# Patient Record
Sex: MALE | Race: OTHER | HISPANIC OR LATINO | URBAN - METROPOLITAN AREA
[De-identification: names, ages, dates, MRNs, and addresses within clinical notes are randomized per-mention and may not be internally consistent; named-entity substitution may affect disease eponyms.]

---

## 2018-10-19 ENCOUNTER — EMERGENCY (EMERGENCY)
Facility: HOSPITAL | Age: 68
LOS: 1 days | End: 2018-10-19
Attending: EMERGENCY MEDICINE
Payer: MEDICARE

## 2018-10-19 VITALS — HEIGHT: 69 IN | WEIGHT: 220.02 LBS

## 2018-10-19 PROCEDURE — 99284 EMERGENCY DEPT VISIT MOD MDM: CPT

## 2018-10-19 PROCEDURE — 73030 X-RAY EXAM OF SHOULDER: CPT | Mod: 26,LT

## 2018-10-19 PROCEDURE — 71045 X-RAY EXAM CHEST 1 VIEW: CPT | Mod: 26

## 2018-10-19 PROCEDURE — 93010 ELECTROCARDIOGRAM REPORT: CPT

## 2018-10-19 RX ORDER — ACETAMINOPHEN 500 MG
975 TABLET ORAL ONCE
Qty: 0 | Refills: 0 | Status: COMPLETED | OUTPATIENT
Start: 2018-10-19 | End: 2018-10-20

## 2018-10-19 NOTE — ED STATDOCS - PROGRESS NOTE DETAILS
69 y/o M pt with hx of Parkinson's disease (Dx 2010) presents to ED c/o head and shoulder pain s/p fall x 2 today. Pt's family state pt is not on medication by choice and his symptoms are worsening. Pt has had frequent falls. Pt states is open to reconsidering taking medication. Has appt with neuro soon. NKDA. Nonsmoker, denies EtOH.   Pt will be placed in the main ED for complete evaluation by another provider.

## 2018-10-20 VITALS
DIASTOLIC BLOOD PRESSURE: 63 MMHG | HEART RATE: 67 BPM | TEMPERATURE: 98 F | RESPIRATION RATE: 16 BRPM | OXYGEN SATURATION: 97 % | SYSTOLIC BLOOD PRESSURE: 130 MMHG
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
## 2018-10-20 LAB
ALBUMIN SERPL ELPH-MCNC: 4.1 G/DL — SIGNIFICANT CHANGE UP (ref 3.3–5.2)
ALP SERPL-CCNC: 61 U/L — SIGNIFICANT CHANGE UP (ref 40–120)
ALT FLD-CCNC: 20 U/L — SIGNIFICANT CHANGE UP
ANION GAP SERPL CALC-SCNC: 9 MMOL/L — SIGNIFICANT CHANGE UP (ref 5–17)
APPEARANCE UR: CLEAR — SIGNIFICANT CHANGE UP
AST SERPL-CCNC: 40 U/L — HIGH
BASOPHILS # BLD AUTO: 0 K/UL — SIGNIFICANT CHANGE UP (ref 0–0.2)
BASOPHILS NFR BLD AUTO: 0.3 % — SIGNIFICANT CHANGE UP (ref 0–2)
BILIRUB SERPL-MCNC: 0.5 MG/DL — SIGNIFICANT CHANGE UP (ref 0.4–2)
BILIRUB UR-MCNC: NEGATIVE — SIGNIFICANT CHANGE UP
BUN SERPL-MCNC: 24 MG/DL — HIGH (ref 8–20)
CALCIUM SERPL-MCNC: 9.1 MG/DL — SIGNIFICANT CHANGE UP (ref 8.6–10.2)
CHLORIDE SERPL-SCNC: 105 MMOL/L — SIGNIFICANT CHANGE UP (ref 98–107)
CK MB CFR SERPL CALC: 7.5 NG/ML — HIGH (ref 0–6.7)
CK SERPL-CCNC: 1537 U/L — HIGH (ref 30–200)
CO2 SERPL-SCNC: 27 MMOL/L — SIGNIFICANT CHANGE UP (ref 22–29)
COLOR SPEC: YELLOW — SIGNIFICANT CHANGE UP
CREAT SERPL-MCNC: 1.61 MG/DL — HIGH (ref 0.5–1.3)
DIFF PNL FLD: NEGATIVE — SIGNIFICANT CHANGE UP
EOSINOPHIL # BLD AUTO: 0.2 K/UL — SIGNIFICANT CHANGE UP (ref 0–0.5)
EOSINOPHIL NFR BLD AUTO: 2.6 % — SIGNIFICANT CHANGE UP (ref 0–5)
EPI CELLS # UR: SIGNIFICANT CHANGE UP
GLUCOSE SERPL-MCNC: 109 MG/DL — SIGNIFICANT CHANGE UP (ref 70–115)
GLUCOSE UR QL: NEGATIVE MG/DL — SIGNIFICANT CHANGE UP
HCT VFR BLD CALC: 39.1 % — LOW (ref 42–52)
HGB BLD-MCNC: 12 G/DL — LOW (ref 14–18)
KETONES UR-MCNC: ABNORMAL
LEUKOCYTE ESTERASE UR-ACNC: ABNORMAL
LYMPHOCYTES # BLD AUTO: 1.7 K/UL — SIGNIFICANT CHANGE UP (ref 1–4.8)
LYMPHOCYTES # BLD AUTO: 22.9 % — SIGNIFICANT CHANGE UP (ref 20–55)
MCHC RBC-ENTMCNC: 27.5 PG — SIGNIFICANT CHANGE UP (ref 27–31)
MCHC RBC-ENTMCNC: 30.7 G/DL — LOW (ref 32–36)
MCV RBC AUTO: 89.7 FL — SIGNIFICANT CHANGE UP (ref 80–94)
MONOCYTES # BLD AUTO: 0.5 K/UL — SIGNIFICANT CHANGE UP (ref 0–0.8)
MONOCYTES NFR BLD AUTO: 7.3 % — SIGNIFICANT CHANGE UP (ref 3–10)
NEUTROPHILS # BLD AUTO: 4.8 K/UL — SIGNIFICANT CHANGE UP (ref 1.8–8)
NEUTROPHILS NFR BLD AUTO: 66.6 % — SIGNIFICANT CHANGE UP (ref 37–73)
NITRITE UR-MCNC: NEGATIVE — SIGNIFICANT CHANGE UP
NT-PROBNP SERPL-SCNC: 190 PG/ML — SIGNIFICANT CHANGE UP (ref 0–300)
PH UR: 5 — SIGNIFICANT CHANGE UP (ref 5–8)
PLATELET # BLD AUTO: 183 K/UL — SIGNIFICANT CHANGE UP (ref 150–400)
POTASSIUM SERPL-MCNC: 4 MMOL/L — SIGNIFICANT CHANGE UP (ref 3.5–5.3)
POTASSIUM SERPL-SCNC: 4 MMOL/L — SIGNIFICANT CHANGE UP (ref 3.5–5.3)
PROT SERPL-MCNC: 6.9 G/DL — SIGNIFICANT CHANGE UP (ref 6.6–8.7)
PROT UR-MCNC: 30 MG/DL
RBC # BLD: 4.36 M/UL — LOW (ref 4.6–6.2)
RBC # FLD: 13.7 % — SIGNIFICANT CHANGE UP (ref 11–15.6)
RBC CASTS # UR COMP ASSIST: NEGATIVE /HPF — SIGNIFICANT CHANGE UP (ref 0–4)
SODIUM SERPL-SCNC: 141 MMOL/L — SIGNIFICANT CHANGE UP (ref 135–145)
SP GR SPEC: 1.02 — SIGNIFICANT CHANGE UP (ref 1.01–1.02)
TROPONIN T SERPL-MCNC: <0.01 NG/ML — SIGNIFICANT CHANGE UP (ref 0–0.06)
UROBILINOGEN FLD QL: NEGATIVE MG/DL — SIGNIFICANT CHANGE UP
WBC # BLD: 7.3 K/UL — SIGNIFICANT CHANGE UP (ref 4.8–10.8)
WBC # FLD AUTO: 7.3 K/UL — SIGNIFICANT CHANGE UP (ref 4.8–10.8)
WBC UR QL: SIGNIFICANT CHANGE UP

## 2018-10-20 PROCEDURE — 70450 CT HEAD/BRAIN W/O DYE: CPT | Mod: 26

## 2018-10-20 PROCEDURE — 82550 ASSAY OF CK (CPK): CPT

## 2018-10-20 PROCEDURE — 82553 CREATINE MB FRACTION: CPT

## 2018-10-20 PROCEDURE — 70450 CT HEAD/BRAIN W/O DYE: CPT

## 2018-10-20 PROCEDURE — 71045 X-RAY EXAM CHEST 1 VIEW: CPT

## 2018-10-20 PROCEDURE — 73030 X-RAY EXAM OF SHOULDER: CPT

## 2018-10-20 PROCEDURE — 36415 COLL VENOUS BLD VENIPUNCTURE: CPT

## 2018-10-20 PROCEDURE — 80053 COMPREHEN METABOLIC PANEL: CPT

## 2018-10-20 PROCEDURE — 85027 COMPLETE CBC AUTOMATED: CPT

## 2018-10-20 PROCEDURE — 84484 ASSAY OF TROPONIN QUANT: CPT

## 2018-10-20 PROCEDURE — 99284 EMERGENCY DEPT VISIT MOD MDM: CPT | Mod: 25

## 2018-10-20 PROCEDURE — 93005 ELECTROCARDIOGRAM TRACING: CPT

## 2018-10-20 PROCEDURE — 83880 ASSAY OF NATRIURETIC PEPTIDE: CPT

## 2018-10-20 PROCEDURE — 81001 URINALYSIS AUTO W/SCOPE: CPT

## 2018-10-20 RX ADMIN — Medication 975 MILLIGRAM(S): at 02:24

## 2018-10-20 NOTE — ED ADULT NURSE NOTE - OBJECTIVE STATEMENT
Pt A&Ox4 present to ED after fall x2 yesterday has hx of parkinson's does not take medication at this time. Pt resting comfortably, VSS, no signs of distress at this time, CM in place,  safety maintained, call bell in reach.

## 2018-10-20 NOTE — ED PROVIDER NOTE - MEDICAL DECISION MAKING DETAILS
Will get labs and head CT, general medical evaluation, if negative, not safe discharge due to poor gait, will need to admit, get neuro consult and start treatment

## 2018-10-20 NOTE — ED ADULT NURSE NOTE - NSIMPLEMENTINTERV_GEN_ALL_ED
Implemented All Fall with Harm Risk Interventions:  [place] to call system. Call bell, personal items and telephone within reach. Instruct patient to call for assistance. Room bathroom lighting operational. Non-slip footwear when patient is off stretcher. Physically safe environment: no spills, clutter or unnecessary equipment. Stretcher in lowest position, wheels locked, appropriate side rails in place. Provide visual cue, wrist band, yellow gown, etc. Monitor gait and stability. Monitor for mental status changes and reorient to person, place, and time. Review medications for side effects contributing to fall risk. Reinforce activity limits and safety measures with patient and family. Provide visual clues: red socks.

## 2018-10-20 NOTE — ED PROVIDER NOTE - OBJECTIVE STATEMENT
67 y/o M pt with hx of Parkinson's presents to ED c/o head injury s/p fall that occurred today. Pt slipped and fell twice earlier today. Per pt's daughter, he has been falling frequently due to worsening Parkinson's and does use a walker at baseline. He does not take any medications. Pt has an appointment with a neurologist next week. Denies LOC, neck pain, back pain, and fever. No further complaints at this time.   Neuro: Dr. Castle

## 2018-10-20 NOTE — ED PROVIDER NOTE - PROGRESS NOTE DETAILS
reassessed, more family at bedside; contrary to prior conversation about falls and safety concerns, now patient and family are insistent on taking patient home, they have outpt f/u with neuro in 1 week, precautions disdcussed ok for d/c

## 2018-11-10 ENCOUNTER — EMERGENCY (EMERGENCY)
Facility: HOSPITAL | Age: 68
LOS: 1 days | Discharge: HOME | End: 2018-11-10
Attending: EMERGENCY MEDICINE
Payer: MEDICARE

## 2018-11-10 VITALS
DIASTOLIC BLOOD PRESSURE: 70 MMHG | TEMPERATURE: 99 F | HEIGHT: 70 IN | OXYGEN SATURATION: 96 % | WEIGHT: 210.1 LBS | HEART RATE: 83 BPM | SYSTOLIC BLOOD PRESSURE: 144 MMHG | RESPIRATION RATE: 18 BRPM

## 2018-11-10 LAB
ALBUMIN SERPL ELPH-MCNC: 3.8 G/DL — SIGNIFICANT CHANGE UP (ref 3.3–5.2)
ALP SERPL-CCNC: 56 U/L — SIGNIFICANT CHANGE UP (ref 40–120)
ALT FLD-CCNC: 5 U/L — SIGNIFICANT CHANGE UP
ANION GAP SERPL CALC-SCNC: 10 MMOL/L — SIGNIFICANT CHANGE UP (ref 5–17)
APPEARANCE UR: CLEAR — SIGNIFICANT CHANGE UP
APTT BLD: 29.1 SEC — SIGNIFICANT CHANGE UP (ref 27.5–36.3)
AST SERPL-CCNC: 15 U/L — SIGNIFICANT CHANGE UP
BACTERIA # UR AUTO: ABNORMAL
BASOPHILS # BLD AUTO: 0 K/UL — SIGNIFICANT CHANGE UP (ref 0–0.2)
BASOPHILS NFR BLD AUTO: 0.5 % — SIGNIFICANT CHANGE UP (ref 0–2)
BILIRUB SERPL-MCNC: 0.3 MG/DL — LOW (ref 0.4–2)
BILIRUB UR-MCNC: NEGATIVE — SIGNIFICANT CHANGE UP
BUN SERPL-MCNC: 20 MG/DL — SIGNIFICANT CHANGE UP (ref 8–20)
CALCIUM SERPL-MCNC: 8.7 MG/DL — SIGNIFICANT CHANGE UP (ref 8.6–10.2)
CHLORIDE SERPL-SCNC: 106 MMOL/L — SIGNIFICANT CHANGE UP (ref 98–107)
CO2 SERPL-SCNC: 25 MMOL/L — SIGNIFICANT CHANGE UP (ref 22–29)
COLOR SPEC: YELLOW — SIGNIFICANT CHANGE UP
COMMENT - URINE: SIGNIFICANT CHANGE UP
CREAT SERPL-MCNC: 0.83 MG/DL — SIGNIFICANT CHANGE UP (ref 0.5–1.3)
DIFF PNL FLD: NEGATIVE — SIGNIFICANT CHANGE UP
EOSINOPHIL # BLD AUTO: 0.2 K/UL — SIGNIFICANT CHANGE UP (ref 0–0.5)
EOSINOPHIL NFR BLD AUTO: 3.6 % — SIGNIFICANT CHANGE UP (ref 0–6)
EPI CELLS # UR: SIGNIFICANT CHANGE UP
GLUCOSE SERPL-MCNC: 104 MG/DL — SIGNIFICANT CHANGE UP (ref 70–115)
GLUCOSE UR QL: NEGATIVE MG/DL — SIGNIFICANT CHANGE UP
HCT VFR BLD CALC: 35.8 % — LOW (ref 42–52)
HGB BLD-MCNC: 11.4 G/DL — LOW (ref 14–18)
INR BLD: 1.09 RATIO — SIGNIFICANT CHANGE UP (ref 0.88–1.16)
KETONES UR-MCNC: ABNORMAL
LEUKOCYTE ESTERASE UR-ACNC: NEGATIVE — SIGNIFICANT CHANGE UP
LYMPHOCYTES # BLD AUTO: 1.3 K/UL — SIGNIFICANT CHANGE UP (ref 1–4.8)
LYMPHOCYTES # BLD AUTO: 20.6 % — SIGNIFICANT CHANGE UP (ref 20–55)
MAGNESIUM SERPL-MCNC: 2.1 MG/DL — SIGNIFICANT CHANGE UP (ref 1.6–2.6)
MCHC RBC-ENTMCNC: 28.1 PG — SIGNIFICANT CHANGE UP (ref 27–31)
MCHC RBC-ENTMCNC: 31.8 G/DL — LOW (ref 32–36)
MCV RBC AUTO: 88.2 FL — SIGNIFICANT CHANGE UP (ref 80–94)
MONOCYTES # BLD AUTO: 0.6 K/UL — SIGNIFICANT CHANGE UP (ref 0–0.8)
MONOCYTES NFR BLD AUTO: 8.7 % — SIGNIFICANT CHANGE UP (ref 3–10)
NEUTROPHILS # BLD AUTO: 4.2 K/UL — SIGNIFICANT CHANGE UP (ref 1.8–8)
NEUTROPHILS NFR BLD AUTO: 66.6 % — SIGNIFICANT CHANGE UP (ref 37–73)
NITRITE UR-MCNC: NEGATIVE — SIGNIFICANT CHANGE UP
PH UR: 6 — SIGNIFICANT CHANGE UP (ref 5–8)
PLATELET # BLD AUTO: 190 K/UL — SIGNIFICANT CHANGE UP (ref 150–400)
POTASSIUM SERPL-MCNC: 4.3 MMOL/L — SIGNIFICANT CHANGE UP (ref 3.5–5.3)
POTASSIUM SERPL-SCNC: 4.3 MMOL/L — SIGNIFICANT CHANGE UP (ref 3.5–5.3)
PROT SERPL-MCNC: 6.4 G/DL — LOW (ref 6.6–8.7)
PROT UR-MCNC: 15 MG/DL
PROTHROM AB SERPL-ACNC: 12.6 SEC — SIGNIFICANT CHANGE UP (ref 10–12.9)
RBC # BLD: 4.06 M/UL — LOW (ref 4.6–6.2)
RBC # FLD: 13.3 % — SIGNIFICANT CHANGE UP (ref 11–15.6)
RBC CASTS # UR COMP ASSIST: SIGNIFICANT CHANGE UP /HPF (ref 0–4)
SODIUM SERPL-SCNC: 141 MMOL/L — SIGNIFICANT CHANGE UP (ref 135–145)
SP GR SPEC: 1.01 — SIGNIFICANT CHANGE UP (ref 1.01–1.02)
TROPONIN T SERPL-MCNC: <0.01 NG/ML — SIGNIFICANT CHANGE UP (ref 0–0.06)
TSH SERPL-MCNC: 0.56 UIU/ML — SIGNIFICANT CHANGE UP (ref 0.27–4.2)
UROBILINOGEN FLD QL: 1 MG/DL
WBC # BLD: 6.4 K/UL — SIGNIFICANT CHANGE UP (ref 4.8–10.8)
WBC # FLD AUTO: 6.4 K/UL — SIGNIFICANT CHANGE UP (ref 4.8–10.8)
WBC UR QL: SIGNIFICANT CHANGE UP

## 2018-11-10 PROCEDURE — 85027 COMPLETE CBC AUTOMATED: CPT

## 2018-11-10 PROCEDURE — 87086 URINE CULTURE/COLONY COUNT: CPT

## 2018-11-10 PROCEDURE — 85730 THROMBOPLASTIN TIME PARTIAL: CPT

## 2018-11-10 PROCEDURE — 99285 EMERGENCY DEPT VISIT HI MDM: CPT

## 2018-11-10 PROCEDURE — 99284 EMERGENCY DEPT VISIT MOD MDM: CPT | Mod: 25

## 2018-11-10 PROCEDURE — 36415 COLL VENOUS BLD VENIPUNCTURE: CPT

## 2018-11-10 PROCEDURE — 70450 CT HEAD/BRAIN W/O DYE: CPT

## 2018-11-10 PROCEDURE — 96374 THER/PROPH/DIAG INJ IV PUSH: CPT

## 2018-11-10 PROCEDURE — 71045 X-RAY EXAM CHEST 1 VIEW: CPT | Mod: 26

## 2018-11-10 PROCEDURE — 83735 ASSAY OF MAGNESIUM: CPT

## 2018-11-10 PROCEDURE — 81001 URINALYSIS AUTO W/SCOPE: CPT

## 2018-11-10 PROCEDURE — 93005 ELECTROCARDIOGRAM TRACING: CPT

## 2018-11-10 PROCEDURE — 80053 COMPREHEN METABOLIC PANEL: CPT

## 2018-11-10 PROCEDURE — 93010 ELECTROCARDIOGRAM REPORT: CPT

## 2018-11-10 PROCEDURE — 70450 CT HEAD/BRAIN W/O DYE: CPT | Mod: 26

## 2018-11-10 PROCEDURE — 84443 ASSAY THYROID STIM HORMONE: CPT

## 2018-11-10 PROCEDURE — 85610 PROTHROMBIN TIME: CPT

## 2018-11-10 PROCEDURE — 84484 ASSAY OF TROPONIN QUANT: CPT

## 2018-11-10 PROCEDURE — 71045 X-RAY EXAM CHEST 1 VIEW: CPT

## 2018-11-10 PROCEDURE — 85652 RBC SED RATE AUTOMATED: CPT

## 2018-11-10 RX ORDER — HALOPERIDOL DECANOATE 100 MG/ML
2 INJECTION INTRAMUSCULAR ONCE
Qty: 0 | Refills: 0 | Status: COMPLETED | OUTPATIENT
Start: 2018-11-10 | End: 2018-11-10

## 2018-11-10 RX ADMIN — HALOPERIDOL DECANOATE 2 MILLIGRAM(S): 100 INJECTION INTRAMUSCULAR at 22:57

## 2018-11-10 NOTE — ED ADULT NURSE REASSESSMENT NOTE - NS ED NURSE REASSESS COMMENT FT1
Pt. found wandering hospital halls in plain clothes. confused to place and situation, oriented to person only. escorted back to stretcher with assistance of security, combative and hitting ED staff. As per triage note patient was hallucinating at home, Hx of Parkinson's. notified MD of situation, to see and order 1:1

## 2018-11-10 NOTE — ED ADULT NURSE NOTE - INTERVENTIONS DEFINITIONS
Monitor gait and stability/Physically safe environment: no spills, clutter or unnecessary equipment/Monitor for mental status changes and reorient to person, place, and time/Reinforce activity limits and safety measures with patient and family/Stretcher in lowest position, wheels locked, appropriate side rails in place

## 2018-11-10 NOTE — ED ADULT TRIAGE NOTE - CHIEF COMPLAINT QUOTE
Pt states "I'm here for the same thing that happened last week, I'm hallucinating", as per EMS "Wife called because he thought two men were breaking in to the house and she wants him to get checked out", pt compliant with medication, denies SI/HI at this time

## 2018-11-10 NOTE — ED PROVIDER NOTE - OBJECTIVE STATEMENT
69 y/o M hx of Parkinson's Disease BIB family to the ED for bizarre behavior. History obtained from wife. Wife states at baseline pt is able to walk on his own and is aware of his surroundings. Pt is scheduled for PT/OT from neurologist, has walker at home but refuses to use it. Today, pt woke up at baseline, wife states pt began to feel that intruders had entered his home. Pt states the two intruders has followed him to the hospital. Wife states there were no intruders presents and typically she is able to convince pt that any suspicious noises or sounds were caused by her closing doors. +chronic leg edema.

## 2018-11-10 NOTE — ED PROVIDER NOTE - PHYSICAL EXAMINATION
Constitutional: Appears comfortably, talking in full sentences  Head: NC/AT, no swelling  Eyes: EOMI, no swelling  Mouth: mm moist  Neck: supple, trachea is midline  Chest: Bilateral air entry, symmetrical chest expansion, no distress  Heart: S1 S2 distant  Abdomen: abd soft, non tender  Musc/Skel: extremities with no swelling, no deformity, no spine tenderness, distal pulses present  Neuro: A&Ox3, no focal deficits Constitutional: Appears comfortably, minimal verbal communication, talks in incomprehensible words, flat affect, decreased eye contact  Head: NC/AT, no swelling  Eyes: EOMI, no swelling  Mouth: mm moist  Neck: supple, trachea is midline  Chest: Bilateral air entry, symmetrical chest expansion, poor inspiratory effort decreased breath sounds, no distress  Heart: S1 S2 distant  Abdomen: abd soft, non tender ,no groin erythema  Musc/Skel: extremities with no swelling, no deformity, no spine tenderness, distal pulses present  Neuro: A&Ox3, no focal deficits, no nystagmus, generalized weakness, walking with shuffled gait Constitutional: Appears comfortably, minimal verbal communication, talks in incomprehensible words, flat affect, decreased eye contact  Head: NC/AT, no swelling  Eyes: EOMI, no swelling  Mouth: mm moist  Neck: supple, trachea is midline  Chest: Bilateral air entry, symmetrical chest expansion, poor inspiratory effort decreased breath sounds, no distress  Heart: S1 S2 distant  Abdomen: abd soft, non tender, no groin erythema  Musc/Skel: +1 pitting edema to bilateral lower extremities, no deformity, no spine tenderness, distal pulses present  Neuro: A&Ox3, no focal deficits, no nystagmus, generalized weakness, walking with shuffled gait Constitutional: Appears comfortably, minimal verbal communication, talks in incomprehensible words, flat affect, decreased eye contact  Head: NC/AT, no swelling  Eyes: EOMI, no swelling  Mouth: mm moist  Neck: supple, trachea is midline  Chest: Bilateral air entry, symmetrical chest expansion, poor inspiratory effort decreased breath sounds, no distress  Heart: S1 S2 distant  Abdomen: abd soft, non tender, no groin erythema  Musc/Skel: +1 pitting edema to bilateral lower extremities, bruises on arms, no deformity, no spine tenderness, distal pulses present  Neuro: A&O1, no focal deficits, no nystagmus, generalized weakness, walking with shuffled gait

## 2018-11-10 NOTE — ED ADULT NURSE NOTE - OBJECTIVE STATEMENT
As per triage note pt. was hallucinating at home stating two men were in his home. Wife at bedside explains pt. decline over past month, in and out of states of lucidness. As per triage note pt. was hallucinating at home stating two men were in his home. Wife at bedside explains pt. decline over past month, in and out of states of lucidness. Hx of Parkinson's. recently d.c from [facility] two weeks prior after fall and [place] after Psych Consult. In ED at this time patient found walking down hallways, confused as to time, place, and situation, aggressive and combative with staff. able to escort back to stretcher with assistance of security.

## 2018-11-11 VITALS
DIASTOLIC BLOOD PRESSURE: 82 MMHG | HEART RATE: 63 BPM | TEMPERATURE: 98 F | OXYGEN SATURATION: 97 % | RESPIRATION RATE: 18 BRPM | SYSTOLIC BLOOD PRESSURE: 169 MMHG

## 2018-11-11 PROBLEM — G20 PARKINSON'S DISEASE: Chronic | Status: ACTIVE | Noted: 2018-10-20

## 2018-11-11 LAB — ERYTHROCYTE [SEDIMENTATION RATE] IN BLOOD: 10 MM/HR — SIGNIFICANT CHANGE UP (ref 0–20)

## 2018-11-11 NOTE — ED ADULT NURSE REASSESSMENT NOTE - NS ED NURSE REASSESS COMMENT FT1
[employer] at bedside for pt. p/u. awaiting additional crew members to assist with patient into home [employer] at bedside for pt. p/u. awaiting additional crew members to assist with patient into home. Family members contacted and awaiting pt.

## 2018-11-11 NOTE — ED ADULT NURSE REASSESSMENT NOTE - NS ED NURSE REASSESS COMMENT FT1
Pt. sleeping but awakens easily to vital signs. baseline mentation. environment safe. will continue to monitor and maintain safety.

## 2018-11-12 LAB
CULTURE RESULTS: NO GROWTH — SIGNIFICANT CHANGE UP
SPECIMEN SOURCE: SIGNIFICANT CHANGE UP

## 2019-01-08 ENCOUNTER — INPATIENT (INPATIENT)
Facility: HOSPITAL | Age: 69
LOS: 6 days | Discharge: HOME | DRG: 871 | End: 2019-01-15
Attending: INTERNAL MEDICINE | Admitting: HOSPITALIST
Payer: COMMERCIAL

## 2019-01-08 VITALS
RESPIRATION RATE: 18 BRPM | HEIGHT: 69 IN | HEART RATE: 96 BPM | OXYGEN SATURATION: 96 % | WEIGHT: 214.95 LBS | SYSTOLIC BLOOD PRESSURE: 130 MMHG | TEMPERATURE: 101 F | DIASTOLIC BLOOD PRESSURE: 74 MMHG

## 2019-01-08 LAB
ANION GAP SERPL CALC-SCNC: 11 MMOL/L — SIGNIFICANT CHANGE UP (ref 5–17)
ANISOCYTOSIS BLD QL: SLIGHT — SIGNIFICANT CHANGE UP
BUN SERPL-MCNC: 20 MG/DL — SIGNIFICANT CHANGE UP (ref 8–20)
CALCIUM SERPL-MCNC: 8.5 MG/DL — LOW (ref 8.6–10.2)
CHLORIDE SERPL-SCNC: 102 MMOL/L — SIGNIFICANT CHANGE UP (ref 98–107)
CO2 SERPL-SCNC: 26 MMOL/L — SIGNIFICANT CHANGE UP (ref 22–29)
CREAT SERPL-MCNC: 1.37 MG/DL — HIGH (ref 0.5–1.3)
GLUCOSE SERPL-MCNC: 123 MG/DL — HIGH (ref 70–115)
HCT VFR BLD CALC: 34.6 % — LOW (ref 42–52)
HGB BLD-MCNC: 11.2 G/DL — LOW (ref 14–18)
LACTATE BLDV-MCNC: 0.8 MMOL/L — SIGNIFICANT CHANGE UP (ref 0.5–2)
LYMPHOCYTES # BLD AUTO: 1 % — LOW (ref 20–55)
MACROCYTES BLD QL: SLIGHT — SIGNIFICANT CHANGE UP
MCHC RBC-ENTMCNC: 28.3 PG — SIGNIFICANT CHANGE UP (ref 27–31)
MCHC RBC-ENTMCNC: 32.4 G/DL — SIGNIFICANT CHANGE UP (ref 32–36)
MCV RBC AUTO: 87.4 FL — SIGNIFICANT CHANGE UP (ref 80–94)
MICROCYTES BLD QL: SLIGHT — SIGNIFICANT CHANGE UP
MONOCYTES NFR BLD AUTO: 3 % — SIGNIFICANT CHANGE UP (ref 3–10)
NEUTROPHILS NFR BLD AUTO: 95 % — HIGH (ref 37–73)
NEUTS BAND # BLD: 1 % — SIGNIFICANT CHANGE UP (ref 0–8)
PLAT MORPH BLD: NORMAL — SIGNIFICANT CHANGE UP
PLATELET # BLD AUTO: 147 K/UL — LOW (ref 150–400)
POTASSIUM SERPL-MCNC: 4.2 MMOL/L — SIGNIFICANT CHANGE UP (ref 3.5–5.3)
POTASSIUM SERPL-SCNC: 4.2 MMOL/L — SIGNIFICANT CHANGE UP (ref 3.5–5.3)
RBC # BLD: 3.96 M/UL — LOW (ref 4.6–6.2)
RBC # FLD: 13.9 % — SIGNIFICANT CHANGE UP (ref 11–15.6)
RBC BLD AUTO: ABNORMAL
SODIUM SERPL-SCNC: 139 MMOL/L — SIGNIFICANT CHANGE UP (ref 135–145)
WBC # BLD: 15.5 K/UL — HIGH (ref 4.8–10.8)
WBC # FLD AUTO: 15.5 K/UL — HIGH (ref 4.8–10.8)

## 2019-01-08 PROCEDURE — 99285 EMERGENCY DEPT VISIT HI MDM: CPT

## 2019-01-08 RX ORDER — ACETAMINOPHEN 500 MG
650 TABLET ORAL ONCE
Qty: 0 | Refills: 0 | Status: COMPLETED | OUTPATIENT
Start: 2019-01-08 | End: 2019-01-08

## 2019-01-08 RX ORDER — SODIUM CHLORIDE 9 MG/ML
3000 INJECTION, SOLUTION INTRAVENOUS ONCE
Qty: 0 | Refills: 0 | Status: COMPLETED | OUTPATIENT
Start: 2019-01-08 | End: 2019-01-08

## 2019-01-08 RX ORDER — CEFTRIAXONE 500 MG/1
1 INJECTION, POWDER, FOR SOLUTION INTRAMUSCULAR; INTRAVENOUS ONCE
Qty: 0 | Refills: 0 | Status: COMPLETED | OUTPATIENT
Start: 2019-01-08 | End: 2019-01-08

## 2019-01-08 RX ADMIN — CEFTRIAXONE 100 GRAM(S): 500 INJECTION, POWDER, FOR SOLUTION INTRAMUSCULAR; INTRAVENOUS at 21:45

## 2019-01-08 RX ADMIN — SODIUM CHLORIDE 4000 MILLILITER(S): 9 INJECTION, SOLUTION INTRAVENOUS at 21:45

## 2019-01-08 RX ADMIN — Medication 650 MILLIGRAM(S): at 21:39

## 2019-01-08 NOTE — ED ADULT NURSE NOTE - OBJECTIVE STATEMENT
patient was found to have a hot edematous, hot, swollen, red right lower leg. patient denies any pain. code sepsis called. leg was noted by wife.

## 2019-01-08 NOTE — ED ADULT NURSE NOTE - NSIMPLEMENTINTERV_GEN_ALL_ED
Implemented All Fall Risk Interventions:  [place] to call system. Call bell, personal items and telephone within reach. Instruct patient to call for assistance. Room bathroom lighting operational. Non-slip footwear when patient is off stretcher. Physically safe environment: no spills, clutter or unnecessary equipment. Stretcher in lowest position, wheels locked, appropriate side rails in place. Provide visual cue, wrist band, yellow gown, etc. Monitor gait and stability. Monitor for mental status changes and reorient to person, place, and time. Review medications for side effects contributing to fall risk. Reinforce activity limits and safety measures with patient and family.

## 2019-01-08 NOTE — ED PROVIDER NOTE - OBJECTIVE STATEMENT
A 68 year old male pt presents to  ED c/o pain, swelling, erythema to RLE. As per the pt's wife, she noticed swelling, erythema, warmth to the pt's RLE earlier tonight. Pt has been complaining of pain to the leg and fevers. No recent trauma to the leg, no hx of DVT or DM. denies HA or neck pain. no chest pain or sob. no abd pain. no n/v/d. no urinary f/u/d. no back pain. no motor or sensory deficits. denies illicit drug use. no recent travel. no other acute issues symptoms or concerns

## 2019-01-08 NOTE — ED PROVIDER NOTE - MEDICAL DECISION MAKING DETAILS
extensive rle cellulitus in moslty bedbound parkinsons pt high risk detioration if sent home will admit

## 2019-01-09 ENCOUNTER — TRANSCRIPTION ENCOUNTER (OUTPATIENT)
Age: 69
End: 2019-01-09

## 2019-01-09 DIAGNOSIS — L03.90 CELLULITIS, UNSPECIFIED: ICD-10-CM

## 2019-01-09 DIAGNOSIS — G20 PARKINSON'S DISEASE: ICD-10-CM

## 2019-01-09 DIAGNOSIS — Z88.0 ALLERGY STATUS TO PENICILLIN: ICD-10-CM

## 2019-01-09 DIAGNOSIS — L03.116 CELLULITIS OF LEFT LOWER LIMB: ICD-10-CM

## 2019-01-09 PROCEDURE — 99223 1ST HOSP IP/OBS HIGH 75: CPT

## 2019-01-09 PROCEDURE — 93971 EXTREMITY STUDY: CPT | Mod: 26,RT

## 2019-01-09 PROCEDURE — 93010 ELECTROCARDIOGRAM REPORT: CPT

## 2019-01-09 RX ORDER — HEPARIN SODIUM 5000 [USP'U]/ML
5000 INJECTION INTRAVENOUS; SUBCUTANEOUS EVERY 12 HOURS
Qty: 0 | Refills: 0 | Status: DISCONTINUED | OUTPATIENT
Start: 2019-01-09 | End: 2019-01-15

## 2019-01-09 RX ORDER — SODIUM CHLORIDE 9 MG/ML
1000 INJECTION, SOLUTION INTRAVENOUS
Qty: 0 | Refills: 0 | Status: COMPLETED | OUTPATIENT
Start: 2019-01-09 | End: 2019-01-09

## 2019-01-09 RX ORDER — ACETAMINOPHEN 500 MG
650 TABLET ORAL EVERY 6 HOURS
Qty: 0 | Refills: 0 | Status: DISCONTINUED | OUTPATIENT
Start: 2019-01-09 | End: 2019-01-15

## 2019-01-09 RX ORDER — VANCOMYCIN HCL 1 G
750 VIAL (EA) INTRAVENOUS EVERY 12 HOURS
Qty: 0 | Refills: 0 | Status: DISCONTINUED | OUTPATIENT
Start: 2019-01-09 | End: 2019-01-09

## 2019-01-09 RX ORDER — ASPIRIN/CALCIUM CARB/MAGNESIUM 324 MG
81 TABLET ORAL DAILY
Qty: 0 | Refills: 0 | Status: DISCONTINUED | OUTPATIENT
Start: 2019-01-09 | End: 2019-01-15

## 2019-01-09 RX ORDER — VANCOMYCIN HCL 1 G
1000 VIAL (EA) INTRAVENOUS ONCE
Qty: 0 | Refills: 0 | Status: DISCONTINUED | OUTPATIENT
Start: 2019-01-09 | End: 2019-01-09

## 2019-01-09 RX ORDER — HYDRALAZINE HCL 50 MG
5 TABLET ORAL EVERY 6 HOURS
Qty: 0 | Refills: 0 | Status: DISCONTINUED | OUTPATIENT
Start: 2019-01-09 | End: 2019-01-15

## 2019-01-09 RX ORDER — LINEZOLID 600 MG/300ML
600 INJECTION, SOLUTION INTRAVENOUS EVERY 12 HOURS
Qty: 0 | Refills: 0 | Status: COMPLETED | OUTPATIENT
Start: 2019-01-09 | End: 2019-01-13

## 2019-01-09 RX ORDER — SACCHAROMYCES BOULARDII 250 MG
250 POWDER IN PACKET (EA) ORAL
Qty: 0 | Refills: 0 | Status: COMPLETED | OUTPATIENT
Start: 2019-01-09 | End: 2019-01-14

## 2019-01-09 RX ORDER — CEFTRIAXONE 500 MG/1
1 INJECTION, POWDER, FOR SOLUTION INTRAMUSCULAR; INTRAVENOUS EVERY 24 HOURS
Qty: 0 | Refills: 0 | Status: DISCONTINUED | OUTPATIENT
Start: 2019-01-09 | End: 2019-01-10

## 2019-01-09 RX ADMIN — LINEZOLID 600 MILLIGRAM(S): 600 INJECTION, SOLUTION INTRAVENOUS at 17:48

## 2019-01-09 RX ADMIN — Medication 250 MILLIGRAM(S): at 17:48

## 2019-01-09 RX ADMIN — Medication 1 TABLET(S): at 12:29

## 2019-01-09 RX ADMIN — Medication 81 MILLIGRAM(S): at 12:29

## 2019-01-09 RX ADMIN — HEPARIN SODIUM 5000 UNIT(S): 5000 INJECTION INTRAVENOUS; SUBCUTANEOUS at 17:49

## 2019-01-09 RX ADMIN — Medication 650 MILLIGRAM(S): at 20:34

## 2019-01-09 RX ADMIN — SODIUM CHLORIDE 75 MILLILITER(S): 9 INJECTION, SOLUTION INTRAVENOUS at 21:49

## 2019-01-09 RX ADMIN — CEFTRIAXONE 100 GRAM(S): 500 INJECTION, POWDER, FOR SOLUTION INTRAMUSCULAR; INTRAVENOUS at 21:51

## 2019-01-09 RX ADMIN — Medication 650 MILLIGRAM(S): at 14:57

## 2019-01-09 NOTE — CONSULT NOTE ADULT - ASSESSMENT
67 y/o Male with h/o Parkinson Dz here with left leg swelling, and erythema      LLE Cellulitis  Parkinson's disease  PCN allergy    - Blood cultures pending  - Will continue Ceftriaxone since patient is tolerating it  - D/C Vancomycin  - Start Linezolid 600mg PO q12 hours  - Patient advised to avoid Cheese and Wine while on linezolid  - Patient is not on MAOI's or SSRI's   - Trend Fever  - Trend Leukocytosis    Will follow

## 2019-01-09 NOTE — CONSULT NOTE ADULT - SUBJECTIVE AND OBJECTIVE BOX
[facility]  INFECTIOUS DISEASES AND INTERNAL MEDICINE at [place]  =======================================================  George Landis MD  Diplomates American Board of Internal Medicine and Infectious Diseases  =======================================================      [id number]  CLAIRE NAQVI     History provided by patient's wife. Patient is awake and alert but not able to provide full history.     CC: Patient is a 68y old  Male who presents with a chief complaint of confusion and left leg swelling and redness    67 y/o Male with h/o Parkinson Dz here with left leg swelling, and erythema. Patient was well up until last night when patient's wife noticed patient to be lethargic, confused and diaphoretic. She took the patient into the shower and gave him a bath and noticed his left lower extremity with erythema. No known trauma to the leg. She brought the patient to an urgent care and was referred to te ER. In the ER patient was febrile to 101F, leukocytosis to 15.5k. Started on IV Vancomycin and Ceftriaxone. ID input requested.       Past Medical & Surgical Hx:  Parkinson disease  No significant past surgical history      Social Hx:  Denies smoking, ETOH or drug use      FAMILY HISTORY:  Denies any family history  in mother or Father      Allergies  Pen-Vee K (Rash)      Antibiotics:  cefTRIAXone   IVPB 1 Gram(s) IV Intermittent every 24 hours       REVIEW OF SYSTEMS:  Unable to obtain. Patient not able to provide for answers of ROS      Physical Exam:  Vital Signs Last 24 Hrs  T(C): 37.2 (09 Jan 2019 02:44), Max: 38.3 (08 Jan 2019 20:29)  T(F): 98.9 (09 Jan 2019 02:44), Max: 101 (08 Jan 2019 20:29)  HR: 76 (09 Jan 2019 02:44) (76 - 96)  BP: 152/77 (09 Jan 2019 02:44) (123/64 - 167/76)  RR: 18 (09 Jan 2019 02:44) (18 - 18)  SpO2: 93% (09 Jan 2019 02:44) (93% - 96%)  Height (cm): 175.26 (01-08 @ 20:29)  Weight (kg): 97.5 (01-08 @ 20:29)  BMI (kg/m2): 31.7 (01-08 @ 20:29)  BSA (m2): 2.13 (01-08 @ 20:29)      GEN: NAD, pleasant  HEENT: normocephalic and atraumatic. EOMI. PERRL.  Anicteric  NECK: Supple.   LUNGS: Clear to auscultation.  HEART: Regular rate and rhythm   ABDOMEN: Soft, nontender, and nondistended.  Positive bowel sounds.    : No CVA tenderness  EXTREMITIES: Without any edema.  MSK: No joint swelling  NEUROLOGIC: Awake, alert, follows commands   SKIN: LLE with erythema, warmth, swelling       Labs:  01-08    139  |  102  |  20.0  ----------------------------<  123<H>  4.2   |  26.0  |  1.37<H>    Ca    8.5<L>      08 Jan 2019 21:46               11.2   15.5  )-----------( 147      ( 08 Jan 2019 21:46 )             34.6       EXAM:  US DPLX LWR EXT VEINS LTD RT                        PROCEDURE DATE:  01/09/2019    INTERPRETATION:  Clinical information:  Swelling   Findings:  Grey scale, color and spectral Doppler  ultrasound were   utilized to evaluate the right lower extremity deep venous system.  There   is normal compression, augmentation and respiratory variation in the   common femoral vein, femoral vein, and popliteal vein. Calf veins are not   visualized.   There is no intraluminal thrombus.  Impression:    No evidence of deep venous thrombosis in the right lower extremity. Calf   veins are not visualized.

## 2019-01-09 NOTE — H&P ADULT - ASSESSMENT
Patient is a 67 y/o male with Parkinsons Disease dx 14 years ago (not on any meds) who presents BIB by wife on the day of admission for being found to have an acutely red and warm RLE. Admitted for RLE acute onset cellulitis     RLE erythema likely 2/2 cellulitis   -presenting with sepsis due to temp 101 in ER, leucocytosis and source   -improving temp   -tylenol prn   -blood cx in lab   -rocephin given in ER, linezolid started on 1/9 by ID   -ID consulted due to acuity   -florastor to continue   -PT evaluation     LUIS - likely due to ATN and low flow state with infection.   -BP however now is on the higher side   -IVF 3L given to patient in ER   -c/w plasmalyte at 75cc/hr   -rpt cr in am     Abnormal lung exam - with crackles likely due to position and fluids given   -incentive spirometer  -activity to chair with assistance     Elevated BP reading -   PRN hydralazine low dose as med naive  -c/w asa 81     h/o Parkinsons Disease - not on any meds   -PT eval   -walker at home, doesn't use   -only on supplemental vitamins    Normocytic anemia - likely aocd  -monitor hgb     DVT ppx - sqh     Dispo - pending improvement in infection. Plan d/w wife at bedside Patient is a 67 y/o male with Parkinsons Disease dx 14 years ago (not on any meds) who presents BIB by wife on the day of admission for being found to have an acutely red and warm RLE. Admitted for RLE acute onset cellulitis     RLE erythema likely 2/2 cellulitis   noted onychomycotic toe nails   -presenting with sepsis due to temp 101 in ER, leucocytosis and source   -improving temp   -tylenol prn   -blood cx in lab   -rocephin given in ER, linezolid started on 1/9 by ID   -ID consulted due to acuity   -florastor to continue   -PT evaluation     LUIS - likely due to ATN and low flow state with infection.   -BP however now is on the higher side   -IVF 3L given to patient in ER   -c/w plasmalyte at 75cc/hr   -rpt cr in am     Abnormal lung exam - with crackles likely due to position and fluids given   -incentive spirometer  -activity to chair with assistance     Elevated BP reading -   PRN hydralazine low dose as med naive  -c/w asa 81     h/o Parkinsons Disease - not on any meds   -PT eval   -walker at home, doesn't use   -only on supplemental vitamins    Normocytic anemia - likely aocd  -monitor hgb     DVT ppx - sqh     Dispo - pending improvement in infection. Plan d/w wife at bedside

## 2019-01-09 NOTE — ED ADULT NURSE REASSESSMENT NOTE - NS ED NURSE REASSESS COMMENT FT1
pt transported to , [facility] notified RN pt refusing test. this writer went to [facility] with family, pt stated he needed to use the bathroom, pt voided without difficutly, and sono resumed. pt pending admission. updated on plan of care, verbalize understanding

## 2019-01-09 NOTE — H&P ADULT - NSHPPHYSICALEXAM_GEN_ALL_CORE
General: elderly appearing male in nad  HEENT: eomi, perrla, no pallor noted   CVS: S1S2nl no m/r/g RRR   Lungs: bibasilar crackles noted posteriorly   GI: soft, nt, bs + ND   Ext: Left LE with chronic skin changes. RLE erythema to about 3cm below knee, + warm. No excoriations or oozing noted. b/l Pulses 2+ LE DP   Skin: grossly intact otherwise   Neuro: AAOx1 at this time, not speaking much awakes to follow exam commands and returns to sleep General: elderly appearing male in nad  HEENT: eomi, perrla, no pallor noted   CVS: S1S2nl no m/r/g RRR   Lungs: bibasilar crackles noted posteriorly   GI: soft, nt, bs + ND   Ext: Left LE with chronic skin changes. RLE erythema to about 3cm below knee, + warm. No excoriations or oozing noted. b/l Pulses 2+ LE DP   Skin: grossly intact otherwise. b/l toes with onychomycosis   Neuro: AAOx1 at this time, not speaking much awakes to follow exam commands and returns to sleep

## 2019-01-09 NOTE — H&P ADULT - HISTORY OF PRESENT ILLNESS
Patient is a 67 y/o male with Parkinsons Disease dx 14 years ago (not on any meds) who presents BIB by wife on the day of admission for being found to have an acutely red and warm RLE. Per wife, patient awoke in his USOH, did his 1/2 mile walk with her and was noted prior to dinner to be unsteady. He had no complaints when asked. He had dinner, felt warm, c/o warmth and after his bath, wife noted RLE redness and brought him to the ER. In the ER, found to have RLE cellulitis.   Patient is not speaking, drowsy and hx taken from wife. Multiple falls in Octover of 2018, no recent travel, no trauma. Did wear new shoes a few days prior to admission with no complaints. No noted outdoor activity otherwise or bug bites. + recliner sleeping in the last week due to PD   Sick contact + grandchild with cold

## 2019-01-10 DIAGNOSIS — A41.2 SEPSIS DUE TO UNSPECIFIED STAPHYLOCOCCUS: ICD-10-CM

## 2019-01-10 LAB
ANION GAP SERPL CALC-SCNC: 11 MMOL/L — SIGNIFICANT CHANGE UP (ref 5–17)
BASOPHILS # BLD AUTO: 0 K/UL — SIGNIFICANT CHANGE UP (ref 0–0.2)
BASOPHILS NFR BLD AUTO: 0.2 % — SIGNIFICANT CHANGE UP (ref 0–2)
BUN SERPL-MCNC: 14 MG/DL — SIGNIFICANT CHANGE UP (ref 8–20)
CALCIUM SERPL-MCNC: 8.4 MG/DL — LOW (ref 8.6–10.2)
CHLORIDE SERPL-SCNC: 101 MMOL/L — SIGNIFICANT CHANGE UP (ref 98–107)
CO2 SERPL-SCNC: 24 MMOL/L — SIGNIFICANT CHANGE UP (ref 22–29)
CREAT SERPL-MCNC: 1.01 MG/DL — SIGNIFICANT CHANGE UP (ref 0.5–1.3)
EOSINOPHIL # BLD AUTO: 0.1 K/UL — SIGNIFICANT CHANGE UP (ref 0–0.5)
EOSINOPHIL NFR BLD AUTO: 1.2 % — SIGNIFICANT CHANGE UP (ref 0–5)
GLUCOSE SERPL-MCNC: 148 MG/DL — HIGH (ref 70–115)
HCT VFR BLD CALC: 34.6 % — LOW (ref 42–52)
HGB BLD-MCNC: 11.2 G/DL — LOW (ref 14–18)
LACTATE SERPL-SCNC: 1.2 MMOL/L — SIGNIFICANT CHANGE UP (ref 0.5–2)
LYMPHOCYTES # BLD AUTO: 1 K/UL — SIGNIFICANT CHANGE UP (ref 1–4.8)
LYMPHOCYTES # BLD AUTO: 9.3 % — LOW (ref 20–55)
MAGNESIUM SERPL-MCNC: 2.2 MG/DL — SIGNIFICANT CHANGE UP (ref 1.6–2.6)
MCHC RBC-ENTMCNC: 28.5 PG — SIGNIFICANT CHANGE UP (ref 27–31)
MCHC RBC-ENTMCNC: 32.4 G/DL — SIGNIFICANT CHANGE UP (ref 32–36)
MCV RBC AUTO: 88 FL — SIGNIFICANT CHANGE UP (ref 80–94)
METHOD TYPE: SIGNIFICANT CHANGE UP
MONOCYTES # BLD AUTO: 0.6 K/UL — SIGNIFICANT CHANGE UP (ref 0–0.8)
MONOCYTES NFR BLD AUTO: 5 % — SIGNIFICANT CHANGE UP (ref 3–10)
MSSA DNA SPEC QL NAA+PROBE: SIGNIFICANT CHANGE UP
NEUTROPHILS # BLD AUTO: 9.4 K/UL — HIGH (ref 1.8–8)
NEUTROPHILS NFR BLD AUTO: 84 % — HIGH (ref 37–73)
PHOSPHATE SERPL-MCNC: 2.1 MG/DL — LOW (ref 2.4–4.7)
PLATELET # BLD AUTO: 141 K/UL — LOW (ref 150–400)
POTASSIUM SERPL-MCNC: 3.7 MMOL/L — SIGNIFICANT CHANGE UP (ref 3.5–5.3)
POTASSIUM SERPL-SCNC: 3.7 MMOL/L — SIGNIFICANT CHANGE UP (ref 3.5–5.3)
RBC # BLD: 3.93 M/UL — LOW (ref 4.6–6.2)
RBC # FLD: 14.2 % — SIGNIFICANT CHANGE UP (ref 11–15.6)
SODIUM SERPL-SCNC: 136 MMOL/L — SIGNIFICANT CHANGE UP (ref 135–145)
WBC # BLD: 11.2 K/UL — HIGH (ref 4.8–10.8)
WBC # FLD AUTO: 11.2 K/UL — HIGH (ref 4.8–10.8)

## 2019-01-10 PROCEDURE — 99233 SBSQ HOSP IP/OBS HIGH 50: CPT

## 2019-01-10 PROCEDURE — 93306 TTE W/DOPPLER COMPLETE: CPT | Mod: 26

## 2019-01-10 RX ORDER — CEFAZOLIN SODIUM 1 G
2000 VIAL (EA) INJECTION EVERY 8 HOURS
Qty: 0 | Refills: 0 | Status: DISCONTINUED | OUTPATIENT
Start: 2019-01-10 | End: 2019-01-15

## 2019-01-10 RX ORDER — ALPRAZOLAM 0.25 MG
0.25 TABLET ORAL ONCE
Qty: 0 | Refills: 0 | Status: DISCONTINUED | OUTPATIENT
Start: 2019-01-10 | End: 2019-01-11

## 2019-01-10 RX ADMIN — HEPARIN SODIUM 5000 UNIT(S): 5000 INJECTION INTRAVENOUS; SUBCUTANEOUS at 17:34

## 2019-01-10 RX ADMIN — Medication 62.5 MILLIMOLE(S): at 18:46

## 2019-01-10 RX ADMIN — Medication 250 MILLIGRAM(S): at 17:34

## 2019-01-10 RX ADMIN — LINEZOLID 600 MILLIGRAM(S): 600 INJECTION, SOLUTION INTRAVENOUS at 05:53

## 2019-01-10 RX ADMIN — HEPARIN SODIUM 5000 UNIT(S): 5000 INJECTION INTRAVENOUS; SUBCUTANEOUS at 05:52

## 2019-01-10 RX ADMIN — Medication 1 TABLET(S): at 11:42

## 2019-01-10 RX ADMIN — Medication 250 MILLIGRAM(S): at 05:52

## 2019-01-10 RX ADMIN — Medication 100 MILLIGRAM(S): at 21:22

## 2019-01-10 RX ADMIN — Medication 81 MILLIGRAM(S): at 11:42

## 2019-01-10 RX ADMIN — LINEZOLID 600 MILLIGRAM(S): 600 INJECTION, SOLUTION INTRAVENOUS at 17:34

## 2019-01-10 NOTE — PROGRESS NOTE ADULT - ASSESSMENT
Patient is a 69 y/o male with Parkinson's Disease dx 14 years ago (not on any meds) who presents BIB by wife on the day of admission for being found to have an acutely red and warm RLE. Admitted for RLE acute onset cellulitis     >RLE cellulitis:  - Afebrile now, WBC down trend noted.   - C/w Zyvox, Ancef added and Rociphen d/emmanuel.   - Tylenol prn, probiotics,   - ID on board.   - Blood cx prelim reports S Aureus.     >Bacteremia:  - Repeat cx.  - Abx as above.   - Discussed with ID, recommended FAYE, cardio called.       >LUIS:  - Likely due to ATN and low flow state with infection.   - Improving.       >Elevated BP:  - No known dx of HTN.  - C/w hydralazine prn, if remians elevated will add PO AntiHTN.   - SCw asa 81     >Hx Parkinson's Disease:  - Not on any meds   - PT eval   -walker at home, doesn't use   -only on supplemental vitamins    >Normocytic anemia - Monitor Hgb.   >DVT ppx - sqh

## 2019-01-10 NOTE — PROGRESS NOTE ADULT - SUBJECTIVE AND OBJECTIVE BOX
CLAIRE NAQVI Male 68y [id number]    Patient is a 68y old  Male who presents with a chief complaint of RLE redness (09 Jan 2019 10:07)      Subjective/objective:  Pt seen and examined at bedside, no over night event reported by night staff. Pt reports RLE redness/ swelling, no other complaints.     Review of system:  No fever, chills, nausea, vomiting, headache, dizziness, chest pain, SOB or palpitation.      PHYSICAL EXAM:    Vital Signs Last 24 Hrs  T(C): 36.7 (10 Deo 2019 07:28), Max: 37.5 (09 Jan 2019 17:18)  T(F): 98.1 (10 Deo 2019 07:28), Max: 99.5 (09 Jan 2019 17:18)  HR: 65 (10 Deo 2019 07:28) (65 - 80)  BP: 148/68 (10 Deo 2019 07:28) (119/54 - 148/68)  BP(mean): --  RR: 18 (10 Deo 2019 04:06) (16 - 19)  SpO2: 96% (10 Deo 2019 04:06) (95% - 96%)    GENERAL: Pt lying comfortably, NAD.  CHEST/LUNG: Clear to auscultation bilaterally; No wheezing.  HEART: S1S2+, Regular rate and rhythm; No murmurs.  ABDOMEN: Soft, Nontender, Nondistended; Bowel sounds present.  Extremities: Left LE with chronic skin changes. RLE erythema / warm. No excoriations or oozing noted. B/L Pulses 2+ LE DP   NEURO: AAOX2, follows commands, hand tremors, moves extremities.   PSYCH: normal mood.          MEDICATIONS  (STANDING):  aspirin enteric coated 81 milliGRAM(s) Oral daily  cefTRIAXone   IVPB 1 Gram(s) IV Intermittent every 24 hours  heparin  Injectable 5000 Unit(s) SubCutaneous every 12 hours  linezolid    Tablet 600 milliGRAM(s) Oral every 12 hours  multivitamin 1 Tablet(s) Oral daily  saccharomyces boulardii 250 milliGRAM(s) Oral two times a day  sodium phosphate IVPB 15 milliMole(s) IV Intermittent once    MEDICATIONS  (PRN):  acetaminophen   Tablet .. 650 milliGRAM(s) Oral every 6 hours PRN Temp greater or equal to 38C (100.4F), Mild Pain (1 - 3)  hydrALAZINE Injectable 5 milliGRAM(s) IV Push every 6 hours PRN SBP over 150        Labs:  LABS:                        11.2   11.2  )-----------( 141      ( 10 Deo 2019 09:52 )             34.6     01-10    136  |  101  |  14.0  ----------------------------<  148<H>  3.7   |  24.0  |  1.01    Ca    8.4<L>      10 Deo 2019 09:52  Phos  2.1     01-10  Mg     2.2     01-10

## 2019-01-10 NOTE — PROGRESS NOTE ADULT - SUBJECTIVE AND OBJECTIVE BOX
[facility]  INFECTIOUS DISEASES AND INTERNAL MEDICINE at [place]  =======================================================  George Landis MD  Diplomates American Board of Internal Medicine and Infectious Diseases  =======================================================    CLAIRE NAQVI [id number]    Follow up: Sepsis    No fever  Improving cellulitis       Allergies:  Pen-Vee K (Rash)      Antibiotics:  ceFAZolin   IVPB 2000 milliGRAM(s) IV Intermittent every 8 hours  linezolid    Tablet 600 milliGRAM(s) Oral every 12 hours      REVIEW OF SYSTEMS:  Unable to obtain. Patient not able to provide for answers of ROS      Physical Exam:  Vital Signs Last 24 Hrs  T(C): 36.7 (10 Deo 2019 07:28), Max: 37.5 (09 Jan 2019 17:18)  T(F): 98.1 (10 Deo 2019 07:28), Max: 99.5 (09 Jan 2019 17:18)  HR: 65 (10 Deo 2019 07:28) (65 - 80)  BP: 148/68 (10 Deo 2019 07:28) (119/54 - 148/68)  RR: 18 (10 Deo 2019 04:06) (16 - 19)  SpO2: 96% (10 Deo 2019 04:06) (95% - 96%)      GEN: NAD, pleasant  HEENT: normocephalic and atraumatic. EOMI. PERRL.  Anicteric  NECK: Supple.   LUNGS: Clear to auscultation.  HEART: Regular rate and rhythm   ABDOMEN: Soft, nontender, and nondistended.  Positive bowel sounds.    : No CVA tenderness  EXTREMITIES: Without any edema.  MSK: No joint swelling  NEUROLOGIC: Awake, alert, follows commands   SKIN: LLE with erythema, warmth, swelling       Labs:  01-10    136  |  101  |  14.0  ----------------------------<  148<H>  3.7   |  24.0  |  1.01    Ca    8.4<L>      10 Deo 2019 09:52  Phos  2.1     01-10  Mg     2.2     01-10               11.2   11.2  )-----------( 141      ( 10 Deo 2019 09:52 )             34.6       RECENT CULTURES:  01-08 @ 21:50 .Blood Blood Culture PCR    Growth in anaerobic bottle: Gram Positive Cocci in Clusters  Anaerobic Bottle: 18:01 Hours to positivity  Aerobic Bottle: No growth to date  ***Blood Panel PCR results on this specimen are available  approximately 3 hours after the Gram stain result.***  Gram stain, PCR, and/or culture results may not always  correspond due to difference in methodologies.  ************************************************************  This PCR assay was performed using [employer].  The following targets are tested for: Enterococcus,  vancomycin resistant enterococci, Listeria monocytogenes,  coagulase negative staphylococci, S. aureus,  methicillin resistant S. aureus, Streptococcus agalactiae  (Group B), S. pneumoniae, S. pyogenes (Group A),  Acinetobacter baumannii, Enterobacter cloacae, E. coli,  Klebsiella oxytoca, K. pneumoniae, Proteus sp.,  Serratia marcescens, Haemophilus influenzae,  Neisseria meningitidis, Pseudomonas aeruginosa, Candida  albicans, C. glabrata, C krusei, C parapsilosis,  C. tropicalis and the KPC resistance gene.  "Due to technical problems, Proteus sp. will Not be reported as part of  the BCID panel until further notice"

## 2019-01-10 NOTE — PHYSICAL THERAPY INITIAL EVALUATION ADULT - IMPAIRMENTS CONTRIBUTING TO GAIT DEVIATIONS, PT EVAL
impaired balance/impaired coordination/decreased flexibility/decreased strength/narrow base of support/impaired motor control

## 2019-01-10 NOTE — PHYSICAL THERAPY INITIAL EVALUATION ADULT - CRITERIA FOR SKILLED THERAPEUTIC INTERVENTIONS
risk reduction/prevention/anticipated equipment needs at discharge/anticipated discharge recommendation/impairments found/therapy frequency/functional limitations in following categories/predicted duration of therapy intervention/rehab potential

## 2019-01-10 NOTE — PHYSICAL THERAPY INITIAL EVALUATION ADULT - ADDITIONAL COMMENTS
Pt reports ambulating without assist PTA. Owns a RW, however, does not like to use. Wife assist with bed mobility as pt reports stiffness in am

## 2019-01-10 NOTE — PROGRESS NOTE ADULT - ASSESSMENT
69 y/o Male with h/o Parkinson Dz here with left leg swelling, and erythema    Staph Septicemia  LLE Cellulitis  Parkinson's disease  PCN allergy    - Blood cultures with staph Aureus   - Will Start Cefazolin 2gm IV q 8 hours  - D/C Ceftriaxone  - Continue Linezolid 600mg q12 hours  - Patient advised to avoid Cheese and Wine while on linezolid  - Patient is not on MAOI's or SSRI's   - Trend Fever  - Trend Leukocytosis  - Do TTE, if negative will need FAYE    Will follow

## 2019-01-10 NOTE — PHYSICAL THERAPY INITIAL EVALUATION ADULT - PLANNED THERAPY INTERVENTIONS, PT EVAL
balance training/motor coordination training/strengthening/gait training/neuromuscular re-education/transfer training/stretching/bed mobility training

## 2019-01-11 LAB
-  AMPICILLIN/SULBACTAM: SIGNIFICANT CHANGE UP
-  CEFAZOLIN: SIGNIFICANT CHANGE UP
-  CLINDAMYCIN: SIGNIFICANT CHANGE UP
-  ERYTHROMYCIN: SIGNIFICANT CHANGE UP
-  GENTAMICIN: SIGNIFICANT CHANGE UP
-  OXACILLIN: SIGNIFICANT CHANGE UP
-  RIFAMPIN: SIGNIFICANT CHANGE UP
-  TETRACYCLINE: SIGNIFICANT CHANGE UP
-  TRIMETHOPRIM/SULFAMETHOXAZOLE: SIGNIFICANT CHANGE UP
-  VANCOMYCIN: SIGNIFICANT CHANGE UP
ANION GAP SERPL CALC-SCNC: 11 MMOL/L — SIGNIFICANT CHANGE UP (ref 5–17)
BUN SERPL-MCNC: 11 MG/DL — SIGNIFICANT CHANGE UP (ref 8–20)
CALCIUM SERPL-MCNC: 8.4 MG/DL — LOW (ref 8.6–10.2)
CHLORIDE SERPL-SCNC: 103 MMOL/L — SIGNIFICANT CHANGE UP (ref 98–107)
CO2 SERPL-SCNC: 25 MMOL/L — SIGNIFICANT CHANGE UP (ref 22–29)
CREAT SERPL-MCNC: 0.91 MG/DL — SIGNIFICANT CHANGE UP (ref 0.5–1.3)
GLUCOSE SERPL-MCNC: 123 MG/DL — HIGH (ref 70–115)
HCT VFR BLD CALC: 33.7 % — LOW (ref 42–52)
HGB BLD-MCNC: 10.9 G/DL — LOW (ref 14–18)
MCHC RBC-ENTMCNC: 28.2 PG — SIGNIFICANT CHANGE UP (ref 27–31)
MCHC RBC-ENTMCNC: 32.3 G/DL — SIGNIFICANT CHANGE UP (ref 32–36)
MCV RBC AUTO: 87.3 FL — SIGNIFICANT CHANGE UP (ref 80–94)
METHOD TYPE: SIGNIFICANT CHANGE UP
PLATELET # BLD AUTO: 142 K/UL — LOW (ref 150–400)
POTASSIUM SERPL-MCNC: 3.8 MMOL/L — SIGNIFICANT CHANGE UP (ref 3.5–5.3)
POTASSIUM SERPL-SCNC: 3.8 MMOL/L — SIGNIFICANT CHANGE UP (ref 3.5–5.3)
RBC # BLD: 3.86 M/UL — LOW (ref 4.6–6.2)
RBC # FLD: 14.1 % — SIGNIFICANT CHANGE UP (ref 11–15.6)
SODIUM SERPL-SCNC: 139 MMOL/L — SIGNIFICANT CHANGE UP (ref 135–145)
WBC # BLD: 7.5 K/UL — SIGNIFICANT CHANGE UP (ref 4.8–10.8)
WBC # FLD AUTO: 7.5 K/UL — SIGNIFICANT CHANGE UP (ref 4.8–10.8)

## 2019-01-11 PROCEDURE — 93325 DOPPLER ECHO COLOR FLOW MAPG: CPT | Mod: 26

## 2019-01-11 PROCEDURE — 93312 ECHO TRANSESOPHAGEAL: CPT | Mod: 26

## 2019-01-11 PROCEDURE — 93320 DOPPLER ECHO COMPLETE: CPT | Mod: 26

## 2019-01-11 PROCEDURE — 99232 SBSQ HOSP IP/OBS MODERATE 35: CPT

## 2019-01-11 PROCEDURE — 99233 SBSQ HOSP IP/OBS HIGH 50: CPT

## 2019-01-11 PROCEDURE — 76376 3D RENDER W/INTRP POSTPROCES: CPT | Mod: 26

## 2019-01-11 RX ORDER — AMLODIPINE BESYLATE 2.5 MG/1
2.5 TABLET ORAL AT BEDTIME
Qty: 0 | Refills: 0 | Status: DISCONTINUED | OUTPATIENT
Start: 2019-01-11 | End: 2019-01-15

## 2019-01-11 RX ORDER — ALPRAZOLAM 0.25 MG
0.25 TABLET ORAL ONCE
Qty: 0 | Refills: 0 | Status: DISCONTINUED | OUTPATIENT
Start: 2019-01-11 | End: 2019-01-11

## 2019-01-11 RX ADMIN — HEPARIN SODIUM 5000 UNIT(S): 5000 INJECTION INTRAVENOUS; SUBCUTANEOUS at 18:18

## 2019-01-11 RX ADMIN — Medication 250 MILLIGRAM(S): at 18:18

## 2019-01-11 RX ADMIN — Medication 100 MILLIGRAM(S): at 05:19

## 2019-01-11 RX ADMIN — LINEZOLID 600 MILLIGRAM(S): 600 INJECTION, SOLUTION INTRAVENOUS at 18:18

## 2019-01-11 RX ADMIN — HEPARIN SODIUM 5000 UNIT(S): 5000 INJECTION INTRAVENOUS; SUBCUTANEOUS at 05:19

## 2019-01-11 RX ADMIN — Medication 0.25 MILLIGRAM(S): at 00:06

## 2019-01-11 RX ADMIN — LINEZOLID 600 MILLIGRAM(S): 600 INJECTION, SOLUTION INTRAVENOUS at 05:19

## 2019-01-11 RX ADMIN — Medication 250 MILLIGRAM(S): at 05:19

## 2019-01-11 RX ADMIN — Medication 0.25 MILLIGRAM(S): at 22:02

## 2019-01-11 RX ADMIN — Medication 100 MILLIGRAM(S): at 22:02

## 2019-01-11 NOTE — PROGRESS NOTE ADULT - SUBJECTIVE AND OBJECTIVE BOX
CLAIRE NAQVI Male 68y [id number]    Patient is a 68y old  Male who presents with a chief complaint of RLE redness (11 Jan 2019 09:09)      Subjective/objective:  Pt seen and examined at bedside, no over night event reported by night staff. Pt reports RLE pain/ redness improving, has no other complaints. Tmax 100, WBC trended down.     Review of system:  No chills, nausea, vomiting, headache, dizziness, chest pain, SOB or palpitation.      PHYSICAL EXAM:    Vital Signs Last 24 Hrs  T(C): 36.1 (11 Jan 2019 07:17), Max: 37.8 (11 Jan 2019 05:07)  T(F): 97 (11 Jan 2019 07:17), Max: 100 (11 Jan 2019 05:07)  HR: 58 (11 Jan 2019 07:17) (58 - 73)  BP: 137/65 (11 Jan 2019 07:17) (126/56 - 157/80)  BP(mean): --  RR: 18 (11 Jan 2019 07:17) (18 - 19)  SpO2: 93% (11 Jan 2019 07:17) (93% - 97%)    GENERAL: Pt lying comfortably, NAD.  CHEST/LUNG: Clear to auscultation bilaterally; No wheezing.  HEART: S1S2+, Regular rate and rhythm; No murmurs.  ABDOMEN: Soft, Nontender, Nondistended; Bowel sounds present.  Extremities: Left LE with chronic skin changes. RLE erythema / warm though improving. No excoriations or oozing noted. B/L Pulses 2+ LE DP   NEURO: AAOX2, follows commands, hand tremors, moves extremities.   PSYCH: normal mood.      MEDICATIONS  (STANDING):  aspirin enteric coated 81 milliGRAM(s) Oral daily  ceFAZolin   IVPB 2000 milliGRAM(s) IV Intermittent every 8 hours  heparin  Injectable 5000 Unit(s) SubCutaneous every 12 hours  linezolid    Tablet 600 milliGRAM(s) Oral every 12 hours  multivitamin 1 Tablet(s) Oral daily  saccharomyces boulardii 250 milliGRAM(s) Oral two times a day    MEDICATIONS  (PRN):  acetaminophen   Tablet .. 650 milliGRAM(s) Oral every 6 hours PRN Temp greater or equal to 38C (100.4F), Mild Pain (1 - 3)  hydrALAZINE Injectable 5 milliGRAM(s) IV Push every 6 hours PRN SBP over 150        Labs:  LABS:                        10.9   7.5   )-----------( 142      ( 11 Jan 2019 07:44 )             33.7     01-11    139  |  103  |  11.0  ----------------------------<  123<H>  3.8   |  25.0  |  0.91    Ca    8.4<L>      11 Jan 2019 07:44  Phos  2.1     01-10  Mg     2.2     01-10

## 2019-01-11 NOTE — PROGRESS NOTE ADULT - SUBJECTIVE AND OBJECTIVE BOX
[facility]  INFECTIOUS DISEASES AND INTERNAL MEDICINE at [place]  =======================================================  George Landis MD  Diplomates American Board of Internal Medicine and Infectious Diseases  =======================================================    CLAIRE NAQVI [id number]    Follow up: Sepsis    No fever  Improving cellulitis       Allergies:  Pen-Vee K (Rash)      Antibiotics:  ceFAZolin   IVPB 2000 milliGRAM(s) IV Intermittent every 8 hours  linezolid    Tablet 600 milliGRAM(s) Oral every 12 hours      REVIEW OF SYSTEMS:  Unable to obtain. Patient not able to provide for answers of ROS      Physical Exam:  Vital Signs Last 24 Hrs  T(C): 36.1 (11 Jan 2019 07:17), Max: 37.8 (11 Jan 2019 05:07)  T(F): 97 (11 Jan 2019 07:17), Max: 100 (11 Jan 2019 05:07)  HR: 58 (11 Jan 2019 07:17) (58 - 73)  BP: 137/65 (11 Jan 2019 07:17) (126/56 - 157/80)  RR: 18 (11 Jan 2019 07:17) (18 - 19)  SpO2: 93% (11 Jan 2019 07:17) (93% - 97%)      GEN: NAD, pleasant  HEENT: normocephalic and atraumatic. EOMI. PERRL.  Anicteric  NECK: Supple.   LUNGS: Clear to auscultation.  HEART: Regular rate and rhythm   ABDOMEN: Soft, nontender, and nondistended.  Positive bowel sounds.    : No CVA tenderness  EXTREMITIES: Without any edema.  MSK: No joint swelling  NEUROLOGIC: Awake, alert, follows commands   SKIN: LLE with erythema, warmth, swelling       Labs:  01-11    139  |  103  |  11.0  ----------------------------<  123<H>  3.8   |  25.0  |  0.91    Ca    8.4<L>      11 Jan 2019 07:44  Phos  2.1     01-10  Mg     2.2     01-10               10.9   7.5   )-----------( 142      ( 11 Jan 2019 07:44 )             33.7     RECENT CULTURES:  01-08 @ 21:50 .Blood Staphylococcus aureus  Blood Culture PCR    Growth in anaerobic bottle: Staphylococcus aureus  Anaerobic Bottle: 18:01 Hours to positivity  Aerobic Bottle: No growth to date  ***Blood Panel PCR results on this specimen are available  approximately 3 hours after the Gram stain result.***  Gram stain, PCR, and/or culture results may not always  correspond due to difference in methodologies.  ************************************************************  This PCR assay was performed using [employer].  The following targets are tested for: Enterococcus,  vancomycin resistant enterococci, Listeria monocytogenes,  coagulase negative staphylococci, S. aureus,  methicillin resistant S. aureus, Streptococcus agalactiae  (Group B), S. pneumoniae, S. pyogenes (Group A),  Acinetobacter baumannii, Enterobacter cloacae, E. coli,  Klebsiella oxytoca, K. pneumoniae, Proteus sp.,  Serratia marcescens, Haemophilus influenzae,  Neisseria meningitidis, Pseudomonas aeruginosa, Candida  albicans, C. glabrata, C krusei, C parapsilosis,  C. tropicalis and the KPC resistance gene.  "Due to technical problems, Proteus sp. will Not be reported as part of  the BCID panel until further notice"

## 2019-01-11 NOTE — PROGRESS NOTE ADULT - ASSESSMENT
Patient is a 69 y/o male with Parkinson's Disease dx 14 years ago (not on any meds) who presents BIB by wife for being found to have an acutely red and warm RLE. Admitted for RLE cellulitis, started on IV Abx in consultation with ID. Blood cx positive for MSSA, ID recommended FAYE, cardiology consulted.     >RLE cellulitis:  - Clinically leg better, Tmax 100, WBC down trend noted.   - C/w Zyvox, Ancef per ID.    - Tylenol prn, probiotics,   - ID on board.   - Blood cx MSAA, repeat in process.      >MSSA Bacteremia:  - Repeat cx in process.  - Abx as above.   - Discussed with ID, recommended FAYE, cardio called.       >LUIS- Resolved.     >Elevated BP:  - No known dx of HTN. BP still elevated.  - Add Norvasc low dose, C/w hydralazine prn. Titrate norvasc as per BP reading.  - C/w asa 81     >Hx Parkinson's Disease:  - Not on any meds   - PT eval   - walker at home, doesn't use     >Normocytic anemia - Monitor Hgb.   >DVT ppx - sqh

## 2019-01-11 NOTE — PROGRESS NOTE ADULT - ASSESSMENT
69 y/o Male with h/o Parkinson Dz here with left leg swelling, and erythema    Staph Septicemia  LLE Cellulitis  Parkinson's disease  PCN allergy    - Blood cultures with MSSA  - Continue Cefazolin 2gm IV q 8 hours  - Continue Linezolid 600mg q12 hours till 1/13/19  - Patient advised to avoid Cheese and Wine while on linezolid  - Patient is not on MAOI's or SSRI's   - Trend Fever  - Trend Leukocytosis  - Do TTE, if negative will need FAYE    Will follow

## 2019-01-12 LAB
-  AMPICILLIN/SULBACTAM: SIGNIFICANT CHANGE UP
-  CEFAZOLIN: SIGNIFICANT CHANGE UP
-  CLINDAMYCIN: SIGNIFICANT CHANGE UP
-  ERYTHROMYCIN: SIGNIFICANT CHANGE UP
-  GENTAMICIN: SIGNIFICANT CHANGE UP
-  OXACILLIN: SIGNIFICANT CHANGE UP
-  RIFAMPIN: SIGNIFICANT CHANGE UP
-  TETRACYCLINE: SIGNIFICANT CHANGE UP
-  TRIMETHOPRIM/SULFAMETHOXAZOLE: SIGNIFICANT CHANGE UP
-  VANCOMYCIN: SIGNIFICANT CHANGE UP
ANION GAP SERPL CALC-SCNC: 14 MMOL/L — SIGNIFICANT CHANGE UP (ref 5–17)
BUN SERPL-MCNC: 12 MG/DL — SIGNIFICANT CHANGE UP (ref 8–20)
CALCIUM SERPL-MCNC: 8.8 MG/DL — SIGNIFICANT CHANGE UP (ref 8.6–10.2)
CHLORIDE SERPL-SCNC: 102 MMOL/L — SIGNIFICANT CHANGE UP (ref 98–107)
CO2 SERPL-SCNC: 25 MMOL/L — SIGNIFICANT CHANGE UP (ref 22–29)
CREAT SERPL-MCNC: 0.95 MG/DL — SIGNIFICANT CHANGE UP (ref 0.5–1.3)
GLUCOSE SERPL-MCNC: 112 MG/DL — SIGNIFICANT CHANGE UP (ref 70–115)
HCT VFR BLD CALC: 37.5 % — LOW (ref 42–52)
HGB BLD-MCNC: 12 G/DL — LOW (ref 14–18)
MCHC RBC-ENTMCNC: 28.3 PG — SIGNIFICANT CHANGE UP (ref 27–31)
MCHC RBC-ENTMCNC: 32 G/DL — SIGNIFICANT CHANGE UP (ref 32–36)
MCV RBC AUTO: 88.4 FL — SIGNIFICANT CHANGE UP (ref 80–94)
METHOD TYPE: SIGNIFICANT CHANGE UP
PLATELET # BLD AUTO: 199 K/UL — SIGNIFICANT CHANGE UP (ref 150–400)
POTASSIUM SERPL-MCNC: 3.7 MMOL/L — SIGNIFICANT CHANGE UP (ref 3.5–5.3)
POTASSIUM SERPL-SCNC: 3.7 MMOL/L — SIGNIFICANT CHANGE UP (ref 3.5–5.3)
RBC # BLD: 4.24 M/UL — LOW (ref 4.6–6.2)
RBC # FLD: 13.9 % — SIGNIFICANT CHANGE UP (ref 11–15.6)
SODIUM SERPL-SCNC: 141 MMOL/L — SIGNIFICANT CHANGE UP (ref 135–145)
WBC # BLD: 8.2 K/UL — SIGNIFICANT CHANGE UP (ref 4.8–10.8)
WBC # FLD AUTO: 8.2 K/UL — SIGNIFICANT CHANGE UP (ref 4.8–10.8)

## 2019-01-12 PROCEDURE — 99232 SBSQ HOSP IP/OBS MODERATE 35: CPT

## 2019-01-12 RX ORDER — ALPRAZOLAM 0.25 MG
0.25 TABLET ORAL ONCE
Qty: 0 | Refills: 0 | Status: DISCONTINUED | OUTPATIENT
Start: 2019-01-12 | End: 2019-01-13

## 2019-01-12 RX ADMIN — HEPARIN SODIUM 5000 UNIT(S): 5000 INJECTION INTRAVENOUS; SUBCUTANEOUS at 05:54

## 2019-01-12 RX ADMIN — Medication 100 MILLIGRAM(S): at 22:04

## 2019-01-12 RX ADMIN — LINEZOLID 600 MILLIGRAM(S): 600 INJECTION, SOLUTION INTRAVENOUS at 18:50

## 2019-01-12 RX ADMIN — Medication 100 MILLIGRAM(S): at 05:54

## 2019-01-12 RX ADMIN — LINEZOLID 600 MILLIGRAM(S): 600 INJECTION, SOLUTION INTRAVENOUS at 05:54

## 2019-01-12 RX ADMIN — HEPARIN SODIUM 5000 UNIT(S): 5000 INJECTION INTRAVENOUS; SUBCUTANEOUS at 18:50

## 2019-01-12 RX ADMIN — Medication 250 MILLIGRAM(S): at 18:50

## 2019-01-12 RX ADMIN — Medication 1 TABLET(S): at 14:30

## 2019-01-12 RX ADMIN — Medication 100 MILLIGRAM(S): at 14:30

## 2019-01-12 RX ADMIN — Medication 81 MILLIGRAM(S): at 14:30

## 2019-01-12 RX ADMIN — Medication 250 MILLIGRAM(S): at 05:54

## 2019-01-12 NOTE — PROGRESS NOTE ADULT - ASSESSMENT
Patient is a 69 y/o male with Parkinson's Disease dx 14 years ago (not on any meds) who presents BIB by wife for being found to have an acutely red and warm RLE. Admitted for RLE cellulitis, started on IV Abx in consultation with ID. Blood cx positive for MSSA, underwent FAYE which showed no obvious vegetation, there is a small linear mobile density on tricuspid valve likely ruptured chordea, cardio recommended repeat FAYE in 2 week.      >RLE cellulitis:  - Clinically leg better, Tmax 100, WBC down trend noted.   - C/w Zyvox, Ancef per ID.    - Tylenol prn, probiotics,   - ID on board.   - Blood cx MSAA, repeat in process.      >MSSA Bacteremia:  - Repeat cx in process.  - Abx as above.   - ID on board.       >LUIS- Resolved.     >Elevated BP:  - No known dx of HTN. BP better with addition of Norvasc   - C/w Norvasc low dose, C/w hydralazine prn. Titrate norvasc as per BP reading.  - C/w asa 81     >Hx Parkinson's Disease:  - Not on any meds   - walker at home, doesn't use   - Spoke with wife- she does not want neurology eval as Pt being followed by private neurologist for years- wife and pt prefers outpatient neuro f/u.  >Normocytic anemia - Monitor Hgb.   >DVT ppx - sqh Patient is a 69 y/o male with Parkinson's Disease dx 14 years ago (not on any meds) who presents BIB by wife for being found to have an acutely red and warm RLE. Admitted for RLE cellulitis, started on IV Abx in consultation with ID. Blood cx positive for MSSA, underwent FAYE which showed no obvious vegetation, there is a small linear mobile density on tricuspid valve likely ruptured chordea, cardio recommended repeat FAYE in 2 week as per Dr Fleming.      >RLE cellulitis:  - Clinically leg better, Tmax 100, WBC down trend noted.   - C/w Zyvox, Ancef per ID.    - Tylenol prn, probiotics,   - ID on board.   - Blood cx MSAA, repeat in process.      >MSSA Bacteremia:  - Repeat cx in process.  - Abx as above.   - ID on board.       >LUIS- Resolved.     >Elevated BP:  - No known dx of HTN. BP better with addition of Norvasc   - C/w Norvasc low dose, C/w hydralazine prn. Titrate norvasc as per BP reading.  - C/w asa 81     >Hx Parkinson's Disease:  - Not on any meds   - walker at home, doesn't use   - Spoke with wife- she does not want neurology eval as Pt being followed by private neurologist for years- wife and pt prefers outpatient neuro f/u.  >Normocytic anemia - Monitor Hgb.   >DVT ppx - sqh

## 2019-01-12 NOTE — PROGRESS NOTE ADULT - SUBJECTIVE AND OBJECTIVE BOX
CLAIRE NAQVI Male 68y [id number]    Patient is a 68y old  Male who presents with a chief complaint of RLE redness (11 Jan 2019 10:18)      Subjective/objective:  Pt seen and examined, wife at bedside, here for RLE cellulitis and bacteremia, no over night event reported by night staff. Pt states RLE swelling/ erythema improving.     Review of system:  No fever, chills, nausea, vomiting, headache, dizziness, chest pain, SOB or palpitation.      PHYSICAL EXAM:    Vital Signs Last 24 Hrs  T(C): 36.7 (12 Jan 2019 10:51), Max: 36.7 (11 Jan 2019 18:07)  T(F): 98.1 (12 Jan 2019 10:51), Max: 98.1 (11 Jan 2019 18:07)  HR: 69 (12 Jan 2019 10:51) (63 - 69)  BP: 109/62 (12 Jan 2019 10:51) (109/62 - 144/72)  BP(mean): --  RR: 17 (12 Jan 2019 10:51) (17 - 18)  SpO2: 91% (12 Jan 2019 10:51) (91% - 94%)    GENERAL: Pt lying comfortably, NAD.  CHEST/LUNG: Clear to auscultation bilaterally; No wheezing.  HEART: S1S2+, Regular rate and rhythm; No murmurs.  ABDOMEN: Soft, Nontender, Nondistended; Bowel sounds present.  Extremities: Left LE with chronic skin changes. RLE erythema / swelling though much  improving. No excoriations or oozing noted. B/L Pulses 2+ LE DP   NEURO: AAOX3, follows commands, hand tremors, moves extremities.   PSYCH: normal mood.      MEDICATIONS  (STANDING):  ALPRAZolam 0.25 milliGRAM(s) Oral once  amLODIPine   Tablet 2.5 milliGRAM(s) Oral at bedtime  aspirin enteric coated 81 milliGRAM(s) Oral daily  ceFAZolin   IVPB 2000 milliGRAM(s) IV Intermittent every 8 hours  heparin  Injectable 5000 Unit(s) SubCutaneous every 12 hours  linezolid    Tablet 600 milliGRAM(s) Oral every 12 hours  multivitamin 1 Tablet(s) Oral daily  saccharomyces boulardii 250 milliGRAM(s) Oral two times a day    MEDICATIONS  (PRN):  acetaminophen   Tablet .. 650 milliGRAM(s) Oral every 6 hours PRN Temp greater or equal to 38C (100.4F), Mild Pain (1 - 3)  hydrALAZINE Injectable 5 milliGRAM(s) IV Push every 6 hours PRN SBP over 150        Labs:  LABS:                        12.0   8.2   )-----------( 199      ( 12 Jan 2019 07:27 )             37.5     01-12    141  |  102  |  12.0  ----------------------------<  112  3.7   |  25.0  |  0.95    Ca    8.8      12 Jan 2019 07:27 CLAIRE NAQVI Male 68y [id number]    Patient is a 68y old  Male who presents with a chief complaint of RLE redness (11 Jan 2019 10:18)      Subjective/objective:  Pt seen and examined before noon time, wife at bedside, here for RLE cellulitis and bacteremia, no over night event reported by night staff. Pt states RLE swelling/ erythema improving.     Review of system:  No fever, chills, nausea, vomiting, headache, dizziness, chest pain, SOB or palpitation.      PHYSICAL EXAM:    Vital Signs Last 24 Hrs  T(C): 36.7 (12 Jan 2019 10:51), Max: 36.7 (11 Jan 2019 18:07)  T(F): 98.1 (12 Jan 2019 10:51), Max: 98.1 (11 Jan 2019 18:07)  HR: 69 (12 Jan 2019 10:51) (63 - 69)  BP: 109/62 (12 Jan 2019 10:51) (109/62 - 144/72)  BP(mean): --  RR: 17 (12 Jan 2019 10:51) (17 - 18)  SpO2: 91% (12 Jan 2019 10:51) (91% - 94%)    GENERAL: Pt lying comfortably, NAD.  CHEST/LUNG: Clear to auscultation bilaterally; No wheezing.  HEART: S1S2+, Regular rate and rhythm; No murmurs.  ABDOMEN: Soft, Nontender, Nondistended; Bowel sounds present.  Extremities: Left LE with chronic skin changes. RLE erythema / swelling though much  improving. No excoriations or oozing noted. B/L Pulses 2+ LE DP   NEURO: AAOX3, follows commands, hand tremors, moves extremities.   PSYCH: normal mood.      MEDICATIONS  (STANDING):  ALPRAZolam 0.25 milliGRAM(s) Oral once  amLODIPine   Tablet 2.5 milliGRAM(s) Oral at bedtime  aspirin enteric coated 81 milliGRAM(s) Oral daily  ceFAZolin   IVPB 2000 milliGRAM(s) IV Intermittent every 8 hours  heparin  Injectable 5000 Unit(s) SubCutaneous every 12 hours  linezolid    Tablet 600 milliGRAM(s) Oral every 12 hours  multivitamin 1 Tablet(s) Oral daily  saccharomyces boulardii 250 milliGRAM(s) Oral two times a day    MEDICATIONS  (PRN):  acetaminophen   Tablet .. 650 milliGRAM(s) Oral every 6 hours PRN Temp greater or equal to 38C (100.4F), Mild Pain (1 - 3)  hydrALAZINE Injectable 5 milliGRAM(s) IV Push every 6 hours PRN SBP over 150        Labs:  LABS:                        12.0   8.2   )-----------( 199      ( 12 Jan 2019 07:27 )             37.5     01-12    141  |  102  |  12.0  ----------------------------<  112  3.7   |  25.0  |  0.95    Ca    8.8      12 Jan 2019 07:27

## 2019-01-13 PROCEDURE — 99232 SBSQ HOSP IP/OBS MODERATE 35: CPT

## 2019-01-13 RX ADMIN — Medication 81 MILLIGRAM(S): at 11:45

## 2019-01-13 RX ADMIN — Medication 100 MILLIGRAM(S): at 17:13

## 2019-01-13 RX ADMIN — Medication 1 TABLET(S): at 11:45

## 2019-01-13 RX ADMIN — HEPARIN SODIUM 5000 UNIT(S): 5000 INJECTION INTRAVENOUS; SUBCUTANEOUS at 05:06

## 2019-01-13 RX ADMIN — Medication 0.25 MILLIGRAM(S): at 00:09

## 2019-01-13 RX ADMIN — Medication 250 MILLIGRAM(S): at 17:13

## 2019-01-13 RX ADMIN — Medication 100 MILLIGRAM(S): at 21:20

## 2019-01-13 RX ADMIN — Medication 100 MILLIGRAM(S): at 09:49

## 2019-01-13 RX ADMIN — HEPARIN SODIUM 5000 UNIT(S): 5000 INJECTION INTRAVENOUS; SUBCUTANEOUS at 17:12

## 2019-01-13 RX ADMIN — LINEZOLID 600 MILLIGRAM(S): 600 INJECTION, SOLUTION INTRAVENOUS at 17:13

## 2019-01-13 RX ADMIN — LINEZOLID 600 MILLIGRAM(S): 600 INJECTION, SOLUTION INTRAVENOUS at 05:06

## 2019-01-13 RX ADMIN — AMLODIPINE BESYLATE 2.5 MILLIGRAM(S): 2.5 TABLET ORAL at 21:20

## 2019-01-13 RX ADMIN — Medication 250 MILLIGRAM(S): at 05:06

## 2019-01-13 NOTE — PROGRESS NOTE ADULT - SUBJECTIVE AND OBJECTIVE BOX
CLAIRE NAQVI     Chief Complaint: Patient is a 68y old  Male who presents with a chief complaint of RLE redness (12 Jan 2019 13:51)      PAST MEDICAL & SURGICAL HISTORY:  Parkinson disease  No significant past surgical history      HPI/OVERNIGHT EVENTS: Patient in no distress, Cardio called for FAYE.    MEDICATIONS  (STANDING):  amLODIPine   Tablet 2.5 milliGRAM(s) Oral at bedtime  aspirin enteric coated 81 milliGRAM(s) Oral daily  ceFAZolin   IVPB 2000 milliGRAM(s) IV Intermittent every 8 hours  heparin  Injectable 5000 Unit(s) SubCutaneous every 12 hours  linezolid    Tablet 600 milliGRAM(s) Oral every 12 hours  multivitamin 1 Tablet(s) Oral daily  saccharomyces boulardii 250 milliGRAM(s) Oral two times a day      Vital Signs Last 24 Hrs  T(C): 36.4 (13 Jan 2019 09:59), Max: 36.8 (12 Jan 2019 23:55)  T(F): 97.5 (13 Jan 2019 09:59), Max: 98.3 (12 Jan 2019 23:55)  HR: 82 (13 Jan 2019 09:59) (73 - 82)  BP: 102/60 (13 Jan 2019 09:59) (102/60 - 146/80)  BP(mean): --  RR: 18 (13 Jan 2019 09:59) (18 - 18)  SpO2: 98% (13 Jan 2019 09:59) (98% - 98%)    PHYSICAL EXAM:  HEENT: Masked facies  Neck: No LAD, No JVD  Back: No CVA tenderness  Respiratory: CTAB Cardiovascular: S1 and S2, RRR, no M/G/R  Gastrointestinal: BS+, soft, NT/ND  Extremities: No peripheral edema  Vascular: 2+ peripheral pulses  Neurological: Rigid        CAPILLARY BLOOD GLUCOSE    LABS:                        12.0   8.2   )-----------( 199      ( 12 Jan 2019 07:27 )             37.5     01-12    141  |  102  |  12.0  ----------------------------<  112  3.7   |  25.0  |  0.95    Ca    8.8      12 Jan 2019 07:27            RADIOLOGY & ADDITIONAL TESTS:

## 2019-01-13 NOTE — PROGRESS NOTE ADULT - ASSESSMENT
Patient is a 69 y/o male with Parkinson's Disease dx 14 years ago (not on any meds) who presents BIB by wife for being found to have an acutely red and warm RLE. Admitted for RLE cellulitis, started on IV Abx in consultation with ID. Blood cx positive for MSSA, underwent FAYE which showed no obvious vegetation, there is a small linear mobile density on tricuspid valve likely ruptured chordea, cardio recommended repeat FAYE in 2 week as per Dr Fleming.      >RLE cellulitis:  - Clinically leg better, Tmax 100, WBC down trend noted.   - C/w Zyvox, Ancef per ID.    - Tylenol prn, probiotics,   - ID on board.   - Blood cx MSAA, repeat culture negative   MSSA Bacteremia:  - Repeat cx in process.  - Abx as above.   - ID on board.       >LUIS- Resolved.     >Elevated BP:  - No known dx of HTN. BP better with addition of Norvasc   - C/w Norvasc low dose, C/w hydralazine prn. Titrate norvasc as per BP reading.  - C/w asa 81     >Hx Parkinson's Disease:  - Not on any meds   - walker at home, doesn't use   - Spoke with wife- she does not want neurology eval as Pt being followed by private neurologist for years- wife and pt prefers outpatient neuro f/u.  >Normocytic anemia - Monitor Hgb.   >DVT ppx - sqh

## 2019-01-14 LAB
ANION GAP SERPL CALC-SCNC: 11 MMOL/L — SIGNIFICANT CHANGE UP (ref 5–17)
BUN SERPL-MCNC: 16 MG/DL — SIGNIFICANT CHANGE UP (ref 8–20)
CALCIUM SERPL-MCNC: 9 MG/DL — SIGNIFICANT CHANGE UP (ref 8.6–10.2)
CHLORIDE SERPL-SCNC: 105 MMOL/L — SIGNIFICANT CHANGE UP (ref 98–107)
CO2 SERPL-SCNC: 26 MMOL/L — SIGNIFICANT CHANGE UP (ref 22–29)
CREAT SERPL-MCNC: 0.98 MG/DL — SIGNIFICANT CHANGE UP (ref 0.5–1.3)
CULTURE RESULTS: SIGNIFICANT CHANGE UP
CULTURE RESULTS: SIGNIFICANT CHANGE UP
GLUCOSE SERPL-MCNC: 123 MG/DL — HIGH (ref 70–115)
HCT VFR BLD CALC: 37.2 % — LOW (ref 42–52)
HGB BLD-MCNC: 11.8 G/DL — LOW (ref 14–18)
MAGNESIUM SERPL-MCNC: 2.2 MG/DL — SIGNIFICANT CHANGE UP (ref 1.6–2.6)
MCHC RBC-ENTMCNC: 27.8 PG — SIGNIFICANT CHANGE UP (ref 27–31)
MCHC RBC-ENTMCNC: 31.7 G/DL — LOW (ref 32–36)
MCV RBC AUTO: 87.7 FL — SIGNIFICANT CHANGE UP (ref 80–94)
ORGANISM # SPEC MICROSCOPIC CNT: SIGNIFICANT CHANGE UP
PHOSPHATE SERPL-MCNC: 2.6 MG/DL — SIGNIFICANT CHANGE UP (ref 2.4–4.7)
PLATELET # BLD AUTO: 221 K/UL — SIGNIFICANT CHANGE UP (ref 150–400)
POTASSIUM SERPL-MCNC: 3.8 MMOL/L — SIGNIFICANT CHANGE UP (ref 3.5–5.3)
POTASSIUM SERPL-SCNC: 3.8 MMOL/L — SIGNIFICANT CHANGE UP (ref 3.5–5.3)
RBC # BLD: 4.24 M/UL — LOW (ref 4.6–6.2)
RBC # FLD: 13.7 % — SIGNIFICANT CHANGE UP (ref 11–15.6)
SODIUM SERPL-SCNC: 142 MMOL/L — SIGNIFICANT CHANGE UP (ref 135–145)
SPECIMEN SOURCE: SIGNIFICANT CHANGE UP
SPECIMEN SOURCE: SIGNIFICANT CHANGE UP
WBC # BLD: 6.3 K/UL — SIGNIFICANT CHANGE UP (ref 4.8–10.8)
WBC # FLD AUTO: 6.3 K/UL — SIGNIFICANT CHANGE UP (ref 4.8–10.8)

## 2019-01-14 PROCEDURE — 99232 SBSQ HOSP IP/OBS MODERATE 35: CPT

## 2019-01-14 RX ORDER — ZOLPIDEM TARTRATE 10 MG/1
5 TABLET ORAL AT BEDTIME
Qty: 0 | Refills: 0 | Status: DISCONTINUED | OUTPATIENT
Start: 2019-01-14 | End: 2019-01-15

## 2019-01-14 RX ORDER — ALPRAZOLAM 0.25 MG
0.25 TABLET ORAL ONCE
Qty: 0 | Refills: 0 | Status: DISCONTINUED | OUTPATIENT
Start: 2019-01-14 | End: 2019-01-14

## 2019-01-14 RX ORDER — SACCHAROMYCES BOULARDII 250 MG
250 POWDER IN PACKET (EA) ORAL
Qty: 0 | Refills: 0 | Status: DISCONTINUED | OUTPATIENT
Start: 2019-01-14 | End: 2019-01-15

## 2019-01-14 RX ORDER — LANOLIN ALCOHOL/MO/W.PET/CERES
3 CREAM (GRAM) TOPICAL AT BEDTIME
Qty: 0 | Refills: 0 | Status: DISCONTINUED | OUTPATIENT
Start: 2019-01-14 | End: 2019-01-15

## 2019-01-14 RX ADMIN — Medication 3 MILLIGRAM(S): at 21:33

## 2019-01-14 RX ADMIN — Medication 0.25 MILLIGRAM(S): at 06:12

## 2019-01-14 RX ADMIN — Medication 250 MILLIGRAM(S): at 05:37

## 2019-01-14 RX ADMIN — Medication 100 MILLIGRAM(S): at 21:33

## 2019-01-14 RX ADMIN — Medication 1 TABLET(S): at 12:28

## 2019-01-14 RX ADMIN — Medication 250 MILLIGRAM(S): at 17:45

## 2019-01-14 RX ADMIN — HEPARIN SODIUM 5000 UNIT(S): 5000 INJECTION INTRAVENOUS; SUBCUTANEOUS at 05:37

## 2019-01-14 RX ADMIN — Medication 100 MILLIGRAM(S): at 05:36

## 2019-01-14 RX ADMIN — AMLODIPINE BESYLATE 2.5 MILLIGRAM(S): 2.5 TABLET ORAL at 21:33

## 2019-01-14 RX ADMIN — HEPARIN SODIUM 5000 UNIT(S): 5000 INJECTION INTRAVENOUS; SUBCUTANEOUS at 17:46

## 2019-01-14 RX ADMIN — Medication 81 MILLIGRAM(S): at 12:28

## 2019-01-14 RX ADMIN — Medication 100 MILLIGRAM(S): at 13:05

## 2019-01-14 RX ADMIN — Medication 250 MILLIGRAM(S): at 12:27

## 2019-01-14 NOTE — PROGRESS NOTE ADULT - SUBJECTIVE AND OBJECTIVE BOX
[facility]  INFECTIOUS DISEASES AND INTERNAL MEDICINE at [place]  =======================================================  George Landis MD  Diplomates American Board of Internal Medicine and Infectious Diseases  =======================================================    CLAIRE NAQVI [id number]    Follow up: Sepsis with MSSA    No fever  Improving cellulitis     Allergies:  Pen-Vee K (Rash)      REVIEW OF SYSTEMS:  Unable to obtain. Patient not able to provide for answers of ROS    =======================================================  Antibiotics:  ceFAZolin   IVPB 2000 milliGRAM(s) IV Intermittent every 8 hours    Other medications:  amLODIPine   Tablet 2.5 milliGRAM(s) Oral at bedtime  aspirin enteric coated 81 milliGRAM(s) Oral daily  heparin  Injectable 5000 Unit(s) SubCutaneous every 12 hours  multivitamin 1 Tablet(s) Oral daily  saccharomyces boulardii 250 milliGRAM(s) Oral two times a day    =======================================================    Physical Exam:  T(F): 98.2 (14 Jan 2019 07:23), Max: 98.3 (12 Jan 2019 23:55)  HR: 77 (14 Jan 2019 07:23)  BP: 133/68 (14 Jan 2019 07:23)  RR: 18 (14 Jan 2019 07:23)  SpO2: 95% (14 Jan 2019 07:23) (95% - 98%)    GEN: NAD, pleasant  HEENT: normocephalic and atraumatic. EOMI. PERRL.  Anicteric  NECK: Supple.   LUNGS: Clear to auscultation.  HEART: Regular rate and rhythm   ABDOMEN: Soft, nontender, and nondistended.  Positive bowel sounds.    : No CVA tenderness  EXTREMITIES: Without any edema.  MSK: No joint swelling  NEUROLOGIC: Awake, alert, follows commands   SKIN: LLE with erythema, warmth, swelling     Labs:                        11.8   6.3   )-----------( 221      ( 14 Jan 2019 07:46 )             37.2     01-14    142  |  105  |  16.0  ----------------------------<  123<H>  3.8   |  26.0  |  0.98    Ca    9.0      14 Jan 2019 07:46  Phos  2.6     01-14  Mg     2.2     01-14      Culture - Blood (collected 01-11-19 @ 07:44)  Source: .Blood    Culture - Blood (collected 01-11-19 @ 07:43)  Source: .Blood    Culture - Blood (collected 01-10-19 @ 11:07)  Source: .Blood    Culture - Blood (collected 01-10-19 @ 11:06)  Source: .Blood    Culture - Blood (collected 01-08-19 @ 21:50)  Source: .Blood  Final Report (01-14-19 @ 09:15):    Growth in anaerobic bottle: Staphylococcus aureus    Anaerobic Bottle: 2 days 3.27 Hours to positivity    Aerobic Bottle: No growth at 5 days.    .    TYPE: (C=Critical, N=Notification, A=Abnormal) C    TESTS:  _     DATE/TIME CALLED: _ 01/11/2019 14:19:26    CALLED TO: Boo Olson RN    READ BACK (2 Patient Identifiers)(Y/N): _ Y    READ BACK VALUES (Y/N): _ Y    CALLED BY: Boo Webster  Organism: Staphylococcus aureus (01-14-19 @ 09:15)  Organism: Staphylococcus aureus (01-14-19 @ 09:15)    Sensitivities:      -  Ampicillin/Sulbactam: S <=8/4      -  Cefazolin: S <=4      -  Clindamycin: S <=0.5      -  Erythromycin: S <=0.5      -  Gentamicin: S <=4 Should not be used as monotherapy      -  Oxacillin: S <=0.25      -  RIF- Rifampin: S <=1 Should not be used as monotherapy      -  Tetra/Doxy: S <=4      -  Trimethoprim/Sulfamethoxazole: S <=0.5/9.5      -  Vancomycin: S 0.5      Method Type: MARK ANTHONY    Culture - Blood (collected 01-08-19 @ 21:50)  Source: .Blood  Final Report (01-14-19 @ 09:15):    Growth in anaerobic bottle: Staphylococcus aureus    Anaerobic Bottle: 18:01 Hours to positivity    Aerobic Bottle: No growth at 5 days.    .    ***Blood Panel PCR results on this specimen are available    approximately 3 hours after the Gram stain result.***    Gram stain, PCR, and/or culture results may not always    correspond due to difference in methodologies.    ************************************************************    This PCR assay was performed using [employer].    The following targets are tested for: Enterococcus,    vancomycin resistant enterococci, Listeria monocytogenes,    coagulase negative staphylococci, S. aureus,    methicillin resistant S. aureus, Streptococcus agalactiae    (Group B), S. pneumoniae, S. pyogenes (Group A),    Acinetobacter baumannii, Enterobacter cloacae, E. coli,    Klebsiella oxytoca, K. pneumoniae, Proteus sp.,    Serratia marcescens, Haemophilus influenzae,    Neisseria meningitidis, Pseudomonas aeruginosa, Candida    albicans, C. glabrata, C krusei, C parapsilosis,    C. tropicalis and the KPC resistance gene.    "Due to technical problems, Proteus sp. will Not be reported as part of    the BCID panel until further notice"    .    TYPE: (C=Critical, N=Notification, A=Abnormal) C    TESTS:  _ BLD     DATE/TIME CALLED: _ 01/10/2019 09:47:17    CALLED TO: Boo BLANCO RN    READ BACK (2 Patient Identifiers)(Y/N): _ Y    READ BACK VALUES (Y/N): _ Y    CALLED BY: Boo DUENAS  Organism: Staphylococcus aureus  Blood Culture PCR (01-14-19 @ 09:15)  Organism: Blood Culture PCR (01-14-19 @ 09:15)    Sensitivities:      -  Staphylococcus aureus: Detec Any isolate of Staphylococcus aureus from a blood culture is NOT considered a contaminant.      Method Type: PCR  Organism: Staphylococcus aureus (01-14-19 @ 09:15)    Sensitivities:      -  Ampicillin/Sulbactam: S <=8/4      -  Cefazolin: S <=4      -  Clindamycin: S <=0.5      -  Erythromycin: S <=0.5      -  Gentamicin: S <=4 Should not be used as monotherapy      -  Oxacillin: S <=0.25      -  RIF- Rifampin: S <=1 Should not be used as monotherapy      -  Tetra/Doxy: S <=4      -  Trimethoprim/Sulfamethoxazole: S <=0.5/9.5      -  Vancomycin: S 1      Method Type: MARK ANTHONY

## 2019-01-14 NOTE — PROGRESS NOTE ADULT - ASSESSMENT
67 y/o Male with h/o Parkinson Dz here with left leg swelling, and erythema    Staph Septicemia  LLE Cellulitis  Parkinson's disease  PCN allergy    - Blood cultures with MSSA  - Continue Cefazolin 2gm IV q 8 hours  FAYE per report negative for vegetations, possible torn chordae      - to complete a 14 day course of IV Cefazolin for bacteremia,   Blood cultures cleared on 1/10/19, end date is 1/24/19  - will need midline

## 2019-01-14 NOTE — PROGRESS NOTE ADULT - SUBJECTIVE AND OBJECTIVE BOX
CC: RLE redness     HPI:  69 y/o male with Parkinsons Disease dx 14 years ago (not on any meds) who presents BIB by wife on the day of admission for being found to have an acutely red and warm RLE. In the ER, found to have RLE cellulitis.     INTERVAL HPI/OVERNIGHT EVENTS:  Patient seen and examined sitting up in the chair.  Patient states he feels "like a million bucks."  RLE swelling improved.  Patient denies any headache, dizziness, SOB, CP, abdominal pain, nausea, vomiting, dysuria.  Other ROS reviewed and are negative.    Vital Signs Last 24 Hrs  T(C): 36.8 (14 Jan 2019 07:23), Max: 36.8 (14 Jan 2019 07:23)  T(F): 98.2 (14 Jan 2019 07:23), Max: 98.2 (14 Jan 2019 07:23)  HR: 77 (14 Jan 2019 07:23) (61 - 78)  BP: 133/68 (14 Jan 2019 07:23) (95/59 - 165/64)  BP(mean): --  RR: 18 (14 Jan 2019 07:23) (18 - 18)  SpO2: 95% (14 Jan 2019 07:23) (95% - 95%)  I&O's Detail    13 Jan 2019 07:01  -  14 Jan 2019 07:00  --------------------------------------------------------  IN:    Solution: 100 mL  Total IN: 100 mL    OUT:  Total OUT: 0 mL    Total NET: 100 mL      PHYSICAL EXAM:  GENERAL: NAD  HEAD:  Atraumatic, Normocephalic  NECK: Supple, No JVD, Normal thyroid  NERVOUS SYSTEM:  Alert, Good concentration; Motor Strength 5/5 B/L upper and lower extremities  CHEST/LUNG: Clear to auscultation bilaterally; No rales, rhonchi, wheezing, or rubs  HEART: Regular rate and rhythm; No murmurs, rubs, or gallops  ABDOMEN: Soft, Nontender, Nondistended; Bowel sounds present  EXTREMITIES:  2+ Peripheral Pulses, RLE edema and redness improving                                11.8   6.3   )-----------( 221      ( 14 Jan 2019 07:46 )             37.2     14 Jan 2019 07:46    142    |  105    |  16.0   ----------------------------<  123    3.8     |  26.0   |  0.98     Ca    9.0        14 Jan 2019 07:46  Phos  2.6       14 Jan 2019 07:46  Mg     2.2       14 Jan 2019 07:46      MEDICATIONS  (STANDING):  amLODIPine   Tablet 2.5 milliGRAM(s) Oral at bedtime  aspirin enteric coated 81 milliGRAM(s) Oral daily  ceFAZolin   IVPB 2000 milliGRAM(s) IV Intermittent every 8 hours  heparin  Injectable 5000 Unit(s) SubCutaneous every 12 hours  multivitamin 1 Tablet(s) Oral daily  saccharomyces boulardii 250 milliGRAM(s) Oral two times a day    MEDICATIONS  (PRN):  acetaminophen   Tablet .. 650 milliGRAM(s) Oral every 6 hours PRN Temp greater or equal to 38C (100.4F), Mild Pain (1 - 3)  hydrALAZINE Injectable 5 milliGRAM(s) IV Push every 6 hours PRN SBP over 150

## 2019-01-14 NOTE — PROGRESS NOTE ADULT - ASSESSMENT
Patient is a 67 y/o male with Parkinson's Disease dx 14 years ago (not on any meds) who presents BIB by wife for being found to have an acutely red and warm RLE. Admitted for RLE cellulitis, started on IV Abx in consultation with ID. Blood cx positive for MSSA, underwent FAYE which showed no obvious vegetation, there is a small linear mobile density on tricuspid valve likely ruptured chordea, cardio recommended repeat FAYE in 2 week as per Dr Fleming.      >RLE cellulitis:  - Clinically leg better, afebrile, WBC improved.   - Completed Zyvox, Continue Ancef per ID - will discuss with ID in regards to transition to oral.    - Tylenol prn, probiotics  - ID on board.   - Blood cx MSSA, repeat culture negative     >MSSA Bacteremia:  - Repeat cx negative.  - Abx as above.   - ID on board.     >LUIS- Resolved.     >Elevated BP:  - No known dx of HTN. BP better with addition of Norvasc   - C/w Norvasc low dose, C/w hydralazine prn. Titrate Norvasc as per BP reading.  - C/w asa 81     >Hx Parkinson's Disease:  - Not on any meds   - walker at home, doesn't use   - Spoke with wife- she does not want neurology eval as Pt being followed by private neurologist for years- wife and pt prefers outpatient neuro f/u.    >Normocytic anemia - Monitor Hgb.     >DVT ppx - sqh

## 2019-01-15 ENCOUNTER — TRANSCRIPTION ENCOUNTER (OUTPATIENT)
Age: 69
End: 2019-01-15

## 2019-01-15 VITALS
SYSTOLIC BLOOD PRESSURE: 100 MMHG | DIASTOLIC BLOOD PRESSURE: 58 MMHG | TEMPERATURE: 98 F | OXYGEN SATURATION: 97 % | RESPIRATION RATE: 18 BRPM | HEART RATE: 77 BPM

## 2019-01-15 LAB
CULTURE RESULTS: SIGNIFICANT CHANGE UP
CULTURE RESULTS: SIGNIFICANT CHANGE UP
SPECIMEN SOURCE: SIGNIFICANT CHANGE UP
SPECIMEN SOURCE: SIGNIFICANT CHANGE UP

## 2019-01-15 PROCEDURE — 93971 EXTREMITY STUDY: CPT

## 2019-01-15 PROCEDURE — 97110 THERAPEUTIC EXERCISES: CPT

## 2019-01-15 PROCEDURE — 83605 ASSAY OF LACTIC ACID: CPT

## 2019-01-15 PROCEDURE — 93320 DOPPLER ECHO COMPLETE: CPT

## 2019-01-15 PROCEDURE — 80048 BASIC METABOLIC PNL TOTAL CA: CPT

## 2019-01-15 PROCEDURE — 93325 DOPPLER ECHO COLOR FLOW MAPG: CPT

## 2019-01-15 PROCEDURE — 93306 TTE W/DOPPLER COMPLETE: CPT

## 2019-01-15 PROCEDURE — 83735 ASSAY OF MAGNESIUM: CPT

## 2019-01-15 PROCEDURE — 99285 EMERGENCY DEPT VISIT HI MDM: CPT | Mod: 25

## 2019-01-15 PROCEDURE — 84100 ASSAY OF PHOSPHORUS: CPT

## 2019-01-15 PROCEDURE — 36415 COLL VENOUS BLD VENIPUNCTURE: CPT

## 2019-01-15 PROCEDURE — 96374 THER/PROPH/DIAG INJ IV PUSH: CPT

## 2019-01-15 PROCEDURE — 97163 PT EVAL HIGH COMPLEX 45 MIN: CPT

## 2019-01-15 PROCEDURE — 85027 COMPLETE CBC AUTOMATED: CPT

## 2019-01-15 PROCEDURE — 87186 SC STD MICRODIL/AGAR DIL: CPT

## 2019-01-15 PROCEDURE — 93005 ELECTROCARDIOGRAM TRACING: CPT

## 2019-01-15 PROCEDURE — 97530 THERAPEUTIC ACTIVITIES: CPT

## 2019-01-15 PROCEDURE — 87150 DNA/RNA AMPLIFIED PROBE: CPT

## 2019-01-15 PROCEDURE — 93312 ECHO TRANSESOPHAGEAL: CPT

## 2019-01-15 PROCEDURE — 97116 GAIT TRAINING THERAPY: CPT

## 2019-01-15 PROCEDURE — 99239 HOSP IP/OBS DSCHRG MGMT >30: CPT

## 2019-01-15 PROCEDURE — 87040 BLOOD CULTURE FOR BACTERIA: CPT

## 2019-01-15 RX ORDER — AMLODIPINE BESYLATE 2.5 MG/1
1 TABLET ORAL
Qty: 30 | Refills: 0
Start: 2019-01-15 | End: 2019-02-13

## 2019-01-15 RX ORDER — CEFAZOLIN SODIUM 1 G
2 VIAL (EA) INJECTION
Qty: 60 | Refills: 0
Start: 2019-01-15 | End: 2019-01-24

## 2019-01-15 RX ADMIN — Medication 100 MILLIGRAM(S): at 05:16

## 2019-01-15 RX ADMIN — Medication 1 TABLET(S): at 12:40

## 2019-01-15 RX ADMIN — Medication 81 MILLIGRAM(S): at 12:40

## 2019-01-15 RX ADMIN — HEPARIN SODIUM 5000 UNIT(S): 5000 INJECTION INTRAVENOUS; SUBCUTANEOUS at 05:16

## 2019-01-15 RX ADMIN — Medication 100 MILLIGRAM(S): at 13:23

## 2019-01-15 RX ADMIN — Medication 250 MILLIGRAM(S): at 05:16

## 2019-01-15 NOTE — DISCHARGE NOTE ADULT - PROVIDER TOKENS
FREE:[LAST:[Franks],FIRST:[PMD],PHONE:[(   )    -],FAX:[(   )    -]],TOKEN:'[id number]' TOKEN:'[id number]',FREE:[LAST:[Franks],FIRST:[PMD],PHONE:[(   )    -],FAX:[(   )    -]],TOKEN:'[id number]'

## 2019-01-15 NOTE — DISCHARGE NOTE ADULT - MEDICATION SUMMARY - MEDICATIONS TO TAKE
I will START or STAY ON the medications listed below when I get home from the hospital:    aspirin 81 mg oral tablet  -- 1 tab(s) by mouth once a day  -- Indication: For Home med    amLODIPine 2.5 mg oral tablet  -- 1 tab(s) by mouth once a day (at bedtime)  -- Indication: For HTN    ceFAZolin 2 g intravenous injection  -- 2 gram(s) intravenously every 8 hours   -- Indication: For bacteremia    multivitamin  -- 1 tab(s) by mouth once a day  -- Indication: For Supplement    Vitamin D3 50,000 intl units oral capsule  -- 1 cap(s) by mouth once a week on sat  -- Indication: For Supplement    vitamin E  -- 400 milligram(s) by mouth once a day  -- Indication: For Supplement

## 2019-01-15 NOTE — DISCHARGE NOTE ADULT - CARE PROVIDERS DIRECT ADDRESSES
,DirectAddress_Unknown,DirectAddress_Unknown ,DirectAddress_Unknown,DirectAddress_Unknown,[email or web address]

## 2019-01-15 NOTE — PROCEDURE NOTE - NSPOSTCAREGUIDE_GEN_A_CORE
Instructed patient/caregiver regarding signs and symptoms of infection/Instructed patient/caregiver to follow-up with primary care physician/Keep the cast/splint/dressing clean and dry/Care for catheter as per unit/ICU protocols/Verbal/written post procedure instructions were given to patient/caregiver

## 2019-01-15 NOTE — PROGRESS NOTE ADULT - PROVIDER SPECIALTY LIST ADULT
Hospitalist
Infectious Disease
Hospitalist

## 2019-01-15 NOTE — PROCEDURE NOTE - NSPROCDETAILS_GEN_ALL_CORE
location identified, draped/prepped, sterile technique used/sterile dressing applied/supine position/ultrasound assessment/sterile technique, catheter placed/ultrasound guidance

## 2019-01-15 NOTE — DISCHARGE NOTE ADULT - HOME CARE AGENCY
[facility] care - start of care 1/16/2019  Formerly [facility] -  - START OF CARE 1/15/2019

## 2019-01-15 NOTE — DISCHARGE NOTE ADULT - HOSPITAL COURSE
Patient is a 69 y/o male with Parkinson's Disease dx 14 years ago (not on any meds) who presents BIB by wife for being found to have an acutely red and warm RLE. Admitted for RLE cellulitis, started on IV Abx in consultation with ID. Blood cx positive for MSSA, underwent FAYE which showed no obvious vegetation, there is a small linear mobile density on tricuspid valve likely ruptured chordea, cardio recommended repeat FAYE in 2 week as per Dr Fleming.  ID follow up recommended midline to complete antibiotics until 1/24/19.  Midline placed.  Patient stable for discharge to home with home care.  Patient to followup with cardiology in 1 week.

## 2019-01-15 NOTE — PROGRESS NOTE ADULT - ASSESSMENT
Patient is a 69 y/o male with Parkinson's Disease dx 14 years ago (not on any meds) who presents BIB by wife for being found to have an acutely red and warm RLE. Admitted for RLE cellulitis, started on IV Abx in consultation with ID. Blood cx positive for MSSA, underwent FAYE which showed no obvious vegetation, there is a small linear mobile density on tricuspid valve likely ruptured chordea, cardio recommended repeat FAYE in 2 week as per Dr Fleming.      >RLE cellulitis:  - Clinically leg better, afebrile, WBC improved.   - Completed Zyvox, Continue Ancef per ID, Will continue Ancef via midline until 1/24/19   - Tylenol prn  - ID on board.   - Blood cx MSSA, repeat culture negative     >MSSA Bacteremia:  - Repeat cx negative.  - Abx as above.   - ID on board.     >LUIS- Resolved.     >Elevated BP:  - No known dx of HTN. BP better with addition of Norvasc   - C/w Norvasc low dose, C/w hydralazine prn. Titrate Norvasc as per BP reading.  - C/w asa 81     >Hx Parkinson's Disease:  - Not on any meds   - walker at home, doesn't use   - Spoke with wife- she does not want neurology eval as Pt being followed by private neurologist for years- wife and pt prefers outpatient neuro f/u.    >Normocytic anemia - Monitor Hgb.     >DVT ppx - sqh

## 2019-01-15 NOTE — DISCHARGE NOTE ADULT - CARE PROVIDER_API CALL
CLARE Franks  Phone: (   )    -  Fax: (   )    -    Teja Bailey (MD), Clinical Neurophysiology; Neurology  [address] [address]  Phone: [phone]  Fax: [phone] Teja Bailey), Clinical Neurophysiology; Neurology  [address] [place] [address]  Phone: [phone]  Fax: [phone]    CLARE Franks  Phone: (   )    -  Fax: (   )    -    Deanna Fleming), Cardiology; Internal Medicine  [address] [id number]  Phone: [phone]  Fax: [phone]

## 2019-01-15 NOTE — DISCHARGE NOTE ADULT - PATIENT PORTAL LINK FT
You can access the [facility] Patient Portal, offered by [facility], by registering with the following website: [email or web address]

## 2019-01-15 NOTE — DISCHARGE NOTE ADULT - CARE PLAN
Principal Discharge DX:	Cellulitis of left lower extremity  Goal:	Improved  Assessment and plan of treatment:	Complete antibiotic course.  Follow up with primary doctor.  Secondary Diagnosis:	Bacteremia  Assessment and plan of treatment:	Complete antibiotic course.  Follow up with primary doctor.  Secondary Diagnosis:	Parkinson disease  Assessment and plan of treatment:	Follow up with primary doctor. Principal Discharge DX:	Cellulitis of left lower extremity  Goal:	Improved  Assessment and plan of treatment:	Complete antibiotic course.  Follow up with primary doctor.  Secondary Diagnosis:	Bacteremia  Assessment and plan of treatment:	Complete antibiotic course.  Follow up with primary doctor.  Follow up with cardiology for repeat FAYE.  Secondary Diagnosis:	Parkinson disease  Assessment and plan of treatment:	Follow up with primary doctor.

## 2019-01-15 NOTE — PROCEDURE NOTE - PROCEDURE
<<-----Click on this checkbox to enter Procedure Vascular access with ultrasound guidance  01/15/2019  Patent left basilic vein  Active  JSTEELE  Peripheral insertion of midline catheter  01/15/2019  4FR  12CM  length 34CIRC BARD POWER MIDLINE ns flush good heme back left basilic vein  Active  JSTEELE

## 2019-01-15 NOTE — PROGRESS NOTE ADULT - SUBJECTIVE AND OBJECTIVE BOX
CC: RLE redness (14 Jan 2019 12:24)    HPI:  67 y/o male with Parkinsons Disease dx 14 years ago (not on any meds) who presents BIB by wife on the day of admission for being found to have an acutely red and warm RLE. In the ER, found to have RLE cellulitis.     INTERVAL HPI/OVERNIGHT EVENTS: Patient seen and examined lying in bed.  Patient denies any complaints.  S/p Midline placement today.    Vital Signs Last 24 Hrs  T(C): 37.1 (15 Deo 2019 07:14), Max: 37.1 (15 Deo 2019 07:14)  T(F): 98.7 (15 Deo 2019 07:14), Max: 98.7 (15 Deo 2019 07:14)  HR: 62 (15 Deo 2019 07:14) (62 - 75)  BP: 158/72 (15 Deo 2019 07:14) (120/70 - 158/72)  BP(mean): --  RR: 21 (15 Deo 2019 07:14) (18 - 21)  SpO2: 98% (15 Deo 2019 07:14) (97% - 98%)  I&O's Detail      PHYSICAL EXAM:  GENERAL: NAD  HEAD:  Atraumatic, Normocephalic  NECK: Supple, No JVD, Normal thyroid  NERVOUS SYSTEM:  Alert & Oriented X3, confused at times, Good concentration; Motor Strength 5/5 B/L upper and lower extremities  CHEST/LUNG: Clear to auscultation bilaterally; No rales, rhonchi, wheezing, or rubs  HEART: Regular rate and rhythm; No murmurs, rubs, or gallops  ABDOMEN: Soft, Nontender, Nondistended; Bowel sounds present  EXTREMITIES:  2+ Peripheral Pulses, RLE with improved redness and swelling                            11.8   6.3   )-----------( 221      ( 14 Jan 2019 07:46 )             37.2     14 Jan 2019 07:46    142    |  105    |  16.0   ----------------------------<  123    3.8     |  26.0   |  0.98     Ca    9.0        14 Jan 2019 07:46  Phos  2.6       14 Jan 2019 07:46  Mg     2.2       14 Jan 2019 07:46    Culture - Blood in AM (01.11.19 @ 07:44)    Specimen Source: .Blood    Culture Results:   No growth at 48 hours    Culture - Blood in AM (01.11.19 @ 07:43)    Specimen Source: .Blood    Culture Results:   No growth at 48 hours        MEDICATIONS  (STANDING):  amLODIPine   Tablet 2.5 milliGRAM(s) Oral at bedtime  aspirin enteric coated 81 milliGRAM(s) Oral daily  ceFAZolin   IVPB 2000 milliGRAM(s) IV Intermittent every 8 hours  heparin  Injectable 5000 Unit(s) SubCutaneous every 12 hours  melatonin 3 milliGRAM(s) Oral at bedtime  multivitamin 1 Tablet(s) Oral daily  saccharomyces boulardii 250 milliGRAM(s) Oral two times a day    MEDICATIONS  (PRN):  acetaminophen   Tablet .. 650 milliGRAM(s) Oral every 6 hours PRN Temp greater or equal to 38C (100.4F), Mild Pain (1 - 3)  hydrALAZINE Injectable 5 milliGRAM(s) IV Push every 6 hours PRN SBP over 150  zolpidem 5 milliGRAM(s) Oral at bedtime PRN Insomnia      RADIOLOGY & ADDITIONAL TESTS:  < from: FAYE Echo Doppler (01.11.19 @ 14:14) >  EXAM:  ECHO TRANSESOPHAGEAL    EXAM:  DOPPLER ECHO COMP W SPECTRAL    EXAM:  DOPPLER ECHOCARD COLOR FLOW      PROCEDURE DATE:  Jan 11 2019   .      INTERPRETATION:  REPORT:    TRANSESOPHAGEAL ECHOCARDIOGRAM REPORT         Patient Name:   CLAIRE NAQVI Patient Location: Inpatient  [facility] Rec #:  [id number]        Accession #:      [id number]  Account #:      [id number]        Height:           69.0 in 175.3 cm  YOB: 1950       Weight:           213.0 lb 96.62 kg  Patient Age:    68 years        BSA:              2.12 m²  Patient Gender: M               BP:               147/70 mmHg       Date of Exam:        1/11/2019 2:14:09 PM  Sonographer:         Terri Son  Referring Physician: Carter Zambrano MD, [facility]    Procedure:     Transesophageal Echocardiogram and 3-D Rendering with   image post                 Processing, same workstation.  Indications:   Acute and subacute infective endocarditis - I33.0  Diagnosis:     Acute and subacute infective endocarditis - I33.0  StudyDetails: Technically adequate study.    LV DIASTOLIC FUNCTION:  MV Peak E: 0.60 m/s e', MV Misti: 0.10 m/s  MV Peak A: 0.46 m/s E/e' Ratio: 6.11  E/A Ratio: 1.31    SPECTRAL DOPPLER ANALYSIS (where applicable):       PROCEDURE: After discussion of the risks and benefits of the FAYE, an   informed consent was obtained by the cardiologist. Intravenous sedation   was performed by anesthesia. Local oropharyngeal anesthetic was provided   with viscous lidocaine. The FAYE probe was passed by the cardiologist   without difficulty. Images were obtained with the patient in a left   lateral decubitus position. The patient tolerated the procedure well and   without complications.     PHYSICIAN INTERPRETATION:  Left Ventricle: The left ventricular internal cavity size is normal.  Left ventricular ejection fraction, by visual estimation, is 55 to 60%.  Right Ventricle: Normal right ventricular size and function.  Left Atrium: Mildly enlarged left atrium. No left atrial appendage   thrombus is seen, the left atrial appendage is enlarged and normal left   atrial appendage velocities. Intact intra-atrial septum without shunt.   Color flow doppler and intravenous injection of agitated saline   demonstrates the presence of an intact intra atrial septum.  Right Atrium: Mildly enlarged right atrium.  Pericardium: There is no evidence of pericardial effusion.  Mitral Valve: Trace mitral valve regurgitation is seen.  Tricuspid Valve: Trivial tricuspid regurgitation is visualized. There is   a small mobile density on the tricuspid valve leaflets measuring 0.6 cm   most likely redundant leaflets or ruptured chordae. Cannot rule out   vegetation. Repeat FAYE in 2 weeks if clinically indicated for resolution   or progression of the density and confirm diagnosis.  Aortic Valve: Trivial aortic valve regurgitation is seen.  Pulmonic Valve: Trace pulmonic valve regurgitation.  Venous: The pulmonary veins appear normal.  Shunts: Agitated saline contrast was given intravenously to evaluate for   intracardiac shunting. There is no evidence of a patent foramen ovale.       Summary:   1. (+) cardiac mass/vegetation. No thrombus or shunts visualized.   2. Mildly enlarged left atrium.   3. No left atrial or left atrial appendage thrombus visualized. Left   atrial appendage enlargement and normal left atrial appendage velocities.   No PFO.   4. Left ventricular ejection fraction, by visual estimation, is 55 to   60%.   5. Mildly enlarged right atrium.   6. Normal right ventricular size and function.   7. There is a small mobile density on the tricuspid valve leaflets   measuring 0.6 cm most likely redundant leaflets or ruptured chordae.   Cannot rule out vegetation. Repeat FAYE in 2 weeks if clinically indicated   for resolution or progression of the density and confirm diagnosis.   8. There is no evidence of pericardial effusion.    [id number] Db Moon MD, RPVI, Electronically signed on 1/14/2019 at   7:25:49 PM         *** Final ***                  DB MOON M.D., ATTENDING CARDIOLOGY  This document has been electronically signed. Jan 11 2019  2:14PM          < end of copied text >

## 2019-01-15 NOTE — DISCHARGE NOTE ADULT - PLAN OF CARE
Improved Complete antibiotic course.  Follow up with primary doctor. Follow up with primary doctor. Complete antibiotic course.  Follow up with primary doctor.  Follow up with cardiology for repeat FAYE.

## 2019-01-28 NOTE — ED ADULT TRIAGE NOTE - NS ED NURSE BANDS TYPE
Writer called [facility] to determine if pt's medication was part of the recalled lot   Per the pharmacist, their store did not have the affected irbesartan on their shelves  Pt updated via [employer] message.    Name band;

## 2019-01-30 ENCOUNTER — NON-APPOINTMENT (OUTPATIENT)
Age: 69
End: 2019-01-30

## 2019-01-30 ENCOUNTER — APPOINTMENT (OUTPATIENT)
Dept: CARDIOLOGY | Facility: CLINIC | Age: 69
End: 2019-01-30
Payer: MEDICARE

## 2019-01-30 VITALS — SYSTOLIC BLOOD PRESSURE: 144 MMHG | DIASTOLIC BLOOD PRESSURE: 72 MMHG

## 2019-01-30 VITALS
WEIGHT: 212 LBS | SYSTOLIC BLOOD PRESSURE: 160 MMHG | OXYGEN SATURATION: 94 % | BODY MASS INDEX: 31.4 KG/M2 | HEIGHT: 69 IN | DIASTOLIC BLOOD PRESSURE: 76 MMHG | HEART RATE: 69 BPM

## 2019-01-30 VITALS — SYSTOLIC BLOOD PRESSURE: 153 MMHG | DIASTOLIC BLOOD PRESSURE: 84 MMHG

## 2019-01-30 DIAGNOSIS — Z78.9 OTHER SPECIFIED HEALTH STATUS: ICD-10-CM

## 2019-01-30 DIAGNOSIS — Z87.898 PERSONAL HISTORY OF OTHER SPECIFIED CONDITIONS: ICD-10-CM

## 2019-01-30 DIAGNOSIS — Z86.79 PERSONAL HISTORY OF OTHER DISEASES OF THE CIRCULATORY SYSTEM: ICD-10-CM

## 2019-01-30 DIAGNOSIS — Z82.49 FAMILY HISTORY OF ISCHEMIC HEART DISEASE AND OTHER DISEASES OF THE CIRCULATORY SYSTEM: ICD-10-CM

## 2019-01-30 DIAGNOSIS — Z87.2 PERSONAL HISTORY OF DISEASES OF THE SKIN AND SUBCUTANEOUS TISSUE: ICD-10-CM

## 2019-01-30 PROCEDURE — 93000 ELECTROCARDIOGRAM COMPLETE: CPT

## 2019-01-30 PROCEDURE — 99215 OFFICE O/P EST HI 40 MIN: CPT

## 2019-01-30 RX ORDER — OMEGA-3/DHA/EPA/FISH OIL 300-1000MG
1000 CAPSULE ORAL DAILY
Refills: 0 | Status: ACTIVE | COMMUNITY
Start: 2019-01-30

## 2019-01-30 RX ORDER — FUROSEMIDE 20 MG/1
20 TABLET ORAL
Qty: 10 | Refills: 0 | Status: DISCONTINUED | COMMUNITY
Start: 2018-11-02

## 2019-01-30 RX ORDER — AMLODIPINE BESYLATE 2.5 MG/1
2.5 TABLET ORAL
Qty: 30 | Refills: 0 | Status: DISCONTINUED | COMMUNITY
Start: 2019-01-15

## 2019-01-30 RX ORDER — DIAZEPAM 5 MG/1
5 TABLET ORAL
Qty: 30 | Refills: 0 | Status: DISCONTINUED | COMMUNITY
Start: 2018-12-11

## 2019-01-30 RX ORDER — ASPIRIN ENTERIC COATED TABLETS 81 MG 81 MG/1
81 TABLET, DELAYED RELEASE ORAL DAILY
Qty: 90 | Refills: 0 | Status: ACTIVE | COMMUNITY
Start: 2019-01-30

## 2019-01-30 RX ORDER — CARBIDOPA AND LEVODOPA 25; 100 MG/1; MG/1
25-100 TABLET ORAL
Qty: 90 | Refills: 0 | Status: DISCONTINUED | COMMUNITY
Start: 2018-10-29

## 2019-01-30 RX ORDER — QUETIAPINE FUMARATE 50 MG/1
50 TABLET ORAL
Qty: 60 | Refills: 0 | Status: DISCONTINUED | COMMUNITY
Start: 2018-11-16

## 2019-01-30 RX ORDER — QUETIAPINE FUMARATE 25 MG/1
25 TABLET ORAL
Qty: 90 | Refills: 0 | Status: DISCONTINUED | COMMUNITY
Start: 2019-01-15

## 2019-01-30 RX ORDER — MULTIVITAMIN
TABLET ORAL DAILY
Refills: 0 | Status: ACTIVE | COMMUNITY
Start: 2019-01-30

## 2019-01-30 RX ORDER — ERGOCALCIFEROL 1.25 MG/1
1.25 MG CAPSULE, LIQUID FILLED ORAL
Refills: 0 | Status: ACTIVE | COMMUNITY
Start: 2018-11-05

## 2019-01-30 NOTE — DISCUSSION/SUMMARY
[Patient] : the patient [Risks] : risks [Benefits] : benefits [Alternatives] : alternatives [___ Week(s)] : [unfilled] week(s) [With Me] : with me [FreeTextEntry1] : This is a 68 year old male with Parkinsons, had  bacteremia with tricuspid valve density.\par 1) Tricuspid valve density/disorder: No obvious vegetations. treated with abx. no clear diagnosis of vegetations. \par repeat echo. \par 2) LE edema: right leg worse. varicose veins. venous duplex with reflux study. ace  wrap. elevate legs. \par

## 2019-01-30 NOTE — HISTORY OF PRESENT ILLNESS
[FreeTextEntry1] : tricuspid valve vegetation\par \par patietn was seen in hospital. patient had infection of the blood. was on abx. 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 However, we could not rule out vegetations. He was asked to follow up after abx to repeat the TTE and possible FAYE to see any progression of the density and to check if we need to restart abx and treat him as endocarditis. \par \par patient stopped his Abx on thursday. \par

## 2019-01-30 NOTE — PHYSICAL EXAM
[General Appearance - Well Developed] : well developed [Normal Appearance] : normal appearance [Well Groomed] : well groomed [General Appearance - Well Nourished] : well nourished [No Deformities] : no deformities [General Appearance - In No Acute Distress] : no acute distress [Normal Conjunctiva] : the conjunctiva exhibited no abnormalities [Eyelids - No Xanthelasma] : the eyelids demonstrated no xanthelasmas [Normal Oral Mucosa] : normal oral mucosa [No Oral Pallor] : no oral pallor [No Oral Cyanosis] : no oral cyanosis [Normal Jugular Venous A Waves Present] : normal jugular venous A waves present [Normal Jugular Venous V Waves Present] : normal jugular venous V waves present [No Jugular Venous Hernandez A Waves] : no jugular venous hernandez A waves [Respiration, Rhythm And Depth] : normal respiratory rhythm and effort [Exaggerated Use Of Accessory Muscles For Inspiration] : no accessory muscle use [Auscultation Breath Sounds / Voice Sounds] : lungs were clear to auscultation bilaterally [Heart Rate And Rhythm] : heart rate and rhythm were normal [Heart Sounds] : normal S1 and S2 [Abdomen Soft] : soft [Abdomen Tenderness] : non-tender [Abdomen Mass (___ Cm)] : no abdominal mass palpated [Abnormal Walk] : normal gait [Gait - Sufficient For Exercise Testing] : the gait was sufficient for exercise testing [Nail Clubbing] : no clubbing of the fingernails [Cyanosis, Localized] : no localized cyanosis [Petechial Hemorrhages (___cm)] : no petechial hemorrhages [] : no ischemic changes [No Skin Ulcers] : no skin ulcer [No Xanthoma] : no  xanthoma was observed [FreeTextEntry1] : chronic leg edema. venous skin changes. dark pigmentation [Oriented To Time, Place, And Person] : oriented to person, place, and time [Affect] : the affect was normal [Mood] : the mood was normal [No Anxiety] : not feeling anxious

## 2019-01-30 NOTE — REASON FOR VISIT
[Follow-Up - From Hospitalization] : follow-up of a recent hospitalization for [FreeTextEntry2] : tricuspid valve vegetation [FreeTextEntry1] : tricuspid valve vegetation

## 2019-02-11 ENCOUNTER — APPOINTMENT (OUTPATIENT)
Dept: CARDIOLOGY | Facility: CLINIC | Age: 69
End: 2019-02-11
Payer: MEDICARE

## 2019-02-11 PROCEDURE — 93970 EXTREMITY STUDY: CPT

## 2019-02-12 ENCOUNTER — FORM ENCOUNTER (OUTPATIENT)
Age: 69
End: 2019-02-12

## 2019-02-13 ENCOUNTER — OUTPATIENT (OUTPATIENT)
Dept: OUTPATIENT SERVICES | Facility: HOSPITAL | Age: 69
LOS: 1 days | End: 2019-02-13
Payer: COMMERCIAL

## 2019-02-13 DIAGNOSIS — I07.9 RHEUMATIC TRICUSPID VALVE DISEASE, UNSPECIFIED: ICD-10-CM

## 2019-02-13 PROCEDURE — 93306 TTE W/DOPPLER COMPLETE: CPT

## 2019-02-27 ENCOUNTER — APPOINTMENT (OUTPATIENT)
Dept: CARDIOLOGY | Facility: CLINIC | Age: 69
End: 2019-02-27
Payer: MEDICARE

## 2019-02-27 VITALS
HEIGHT: 69 IN | WEIGHT: 218 LBS | BODY MASS INDEX: 32.29 KG/M2 | HEART RATE: 60 BPM | DIASTOLIC BLOOD PRESSURE: 68 MMHG | SYSTOLIC BLOOD PRESSURE: 120 MMHG | OXYGEN SATURATION: 99 %

## 2019-02-27 PROCEDURE — 99214 OFFICE O/P EST MOD 30 MIN: CPT

## 2019-04-03 ENCOUNTER — TRANSCRIPTION ENCOUNTER (OUTPATIENT)
Age: 69
End: 2019-04-03

## 2019-12-18 ENCOUNTER — APPOINTMENT (OUTPATIENT)
Dept: CARDIOLOGY | Facility: CLINIC | Age: 69
End: 2019-12-18

## 2020-07-13 NOTE — ED ADULT TRIAGE NOTE - ESI TRIAGE ACUITY LEVEL, MLM
Called pt - spoke with daughter  Pt was hospitalized for CHF - hx CAD, ischemic CM  Discharged yesterday -  Pt states no questions today.  Plan for follow up with BECKY BETTS Thursday July 16th at 1050- video visit.  Pt's daughter said they are comfortable with video visits.  No questions on medications.  Pt is doing okay.  Call prn - my phone number and Tre RN, BECKY NP's RN phone number also given to daughter.   3 2

## 2020-11-06 ENCOUNTER — NON-APPOINTMENT (OUTPATIENT)
Age: 70
End: 2020-11-06

## 2020-11-06 ENCOUNTER — APPOINTMENT (OUTPATIENT)
Dept: CARDIOLOGY | Facility: CLINIC | Age: 70
End: 2020-11-06
Payer: MEDICARE

## 2020-11-06 VITALS
TEMPERATURE: 98.9 F | BODY MASS INDEX: 31.84 KG/M2 | WEIGHT: 215 LBS | OXYGEN SATURATION: 95 % | DIASTOLIC BLOOD PRESSURE: 68 MMHG | SYSTOLIC BLOOD PRESSURE: 132 MMHG | HEART RATE: 65 BPM | HEIGHT: 69 IN

## 2020-11-06 VITALS — DIASTOLIC BLOOD PRESSURE: 77 MMHG | SYSTOLIC BLOOD PRESSURE: 141 MMHG

## 2020-11-06 PROCEDURE — 99072 ADDL SUPL MATRL&STAF TM PHE: CPT

## 2020-11-06 PROCEDURE — 93000 ELECTROCARDIOGRAM COMPLETE: CPT

## 2020-11-06 PROCEDURE — 99215 OFFICE O/P EST HI 40 MIN: CPT

## 2020-11-06 RX ORDER — COLD-HOT PACK
EACH MISCELLANEOUS DAILY
Refills: 0 | Status: ACTIVE | COMMUNITY

## 2020-11-06 RX ORDER — EUCALYPTUS OIL/MENTHOL/CAMPHOR 1.2%-4.8%
1000 OINTMENT (GRAM) TOPICAL DAILY
Refills: 0 | Status: ACTIVE | COMMUNITY

## 2020-11-06 RX ORDER — POTASSIUM CHLORIDE 750 MG/1
10 CAPSULE, EXTENDED RELEASE ORAL DAILY
Refills: 0 | Status: DISCONTINUED | COMMUNITY
Start: 2019-01-19 | End: 2020-11-06

## 2020-11-06 RX ORDER — ECHINACEA 400 MG
CAPSULE ORAL
Refills: 0 | Status: ACTIVE | COMMUNITY

## 2020-11-06 RX ORDER — FUROSEMIDE 40 MG/1
40 TABLET ORAL
Qty: 90 | Refills: 1 | Status: DISCONTINUED | COMMUNITY
Start: 2019-01-19 | End: 2020-11-06

## 2020-11-06 NOTE — DISCUSSION/SUMMARY
[Patient] : the patient [Risks] : risks [Benefits] : benefits [Alternatives] : alternatives [___ Year(s)] : [unfilled] year(s) [With Me] : with me [FreeTextEntry1] : This is a 68 year old male with Parkinsons, had  bacteremia with tricuspid valve density.\par 1) Tricuspid valve density/disorder: No obvious vegetations. erpeat echo. \par  2) LE edema:  resolved.   right leg worse. varicose veins. venous duplex with reflux study. ace  wrap. elevate legs. \par conservative management. \par

## 2020-11-06 NOTE — HISTORY OF PRESENT ILLNESS
[FreeTextEntry1] : tricuspid valve vegetation\par \par \par HPI for today:  feels good. no ches tpain. no headaches. no passing. no dizziness,. no weakness.  generalized treamors.\par not on any parkinson meds.\par \par \par \par old note: patietn was seen in hospital. patient had infection of the blood. was on abx. 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 However, we could not rule out vegetations. He was asked to follow up after abx to repeat the TTE and possible FAYE to see any progression of the density and to check if we need to restart abx and treat him as endocarditis. \par \par patient stopped his Abx on thursday. \par

## 2020-11-06 NOTE — PHYSICAL EXAM
[General Appearance - Well Developed] : well developed [Normal Appearance] : normal appearance [Well Groomed] : well groomed [General Appearance - Well Nourished] : well nourished [No Deformities] : no deformities [General Appearance - In No Acute Distress] : no acute distress [Normal Conjunctiva] : the conjunctiva exhibited no abnormalities [Eyelids - No Xanthelasma] : the eyelids demonstrated no xanthelasmas [Normal Oral Mucosa] : normal oral mucosa [No Oral Pallor] : no oral pallor [No Oral Cyanosis] : no oral cyanosis [Normal Jugular Venous A Waves Present] : normal jugular venous A waves present [Normal Jugular Venous V Waves Present] : normal jugular venous V waves present [No Jugular Venous Hernandez A Waves] : no jugular venous hernandez A waves [Respiration, Rhythm And Depth] : normal respiratory rhythm and effort [Exaggerated Use Of Accessory Muscles For Inspiration] : no accessory muscle use [Auscultation Breath Sounds / Voice Sounds] : lungs were clear to auscultation bilaterally [Heart Rate And Rhythm] : heart rate and rhythm were normal [Heart Sounds] : normal S1 and S2 [Abdomen Soft] : soft [Abdomen Tenderness] : non-tender [Abdomen Mass (___ Cm)] : no abdominal mass palpated [Abnormal Walk] : normal gait [Gait - Sufficient For Exercise Testing] : the gait was sufficient for exercise testing [Nail Clubbing] : no clubbing of the fingernails [Cyanosis, Localized] : no localized cyanosis [Petechial Hemorrhages (___cm)] : no petechial hemorrhages [] : no ischemic changes [No Skin Ulcers] : no skin ulcer [No Xanthoma] : no  xanthoma was observed [Oriented To Time, Place, And Person] : oriented to person, place, and time [Affect] : the affect was normal [Mood] : the mood was normal [No Anxiety] : not feeling anxious [FreeTextEntry1] : chronic leg edema. venous skin changes. dark pigmentation

## 2020-11-22 ENCOUNTER — TRANSCRIPTION ENCOUNTER (OUTPATIENT)
Age: 70
End: 2020-11-22

## 2020-11-30 ENCOUNTER — TRANSCRIPTION ENCOUNTER (OUTPATIENT)
Age: 70
End: 2020-11-30

## 2021-03-02 ENCOUNTER — APPOINTMENT (OUTPATIENT)
Dept: CARDIOLOGY | Facility: CLINIC | Age: 71
End: 2021-03-02
Payer: MEDICARE

## 2021-03-02 PROCEDURE — 93306 TTE W/DOPPLER COMPLETE: CPT

## 2021-03-02 PROCEDURE — 99072 ADDL SUPL MATRL&STAF TM PHE: CPT

## 2021-11-09 ENCOUNTER — NON-APPOINTMENT (OUTPATIENT)
Age: 71
End: 2021-11-09

## 2021-11-09 ENCOUNTER — APPOINTMENT (OUTPATIENT)
Dept: CARDIOLOGY | Facility: CLINIC | Age: 71
End: 2021-11-09
Payer: MEDICARE

## 2021-11-09 VITALS
WEIGHT: 210 LBS | HEIGHT: 69 IN | HEART RATE: 64 BPM | OXYGEN SATURATION: 96 % | BODY MASS INDEX: 31.1 KG/M2 | DIASTOLIC BLOOD PRESSURE: 73 MMHG | TEMPERATURE: 98 F | SYSTOLIC BLOOD PRESSURE: 122 MMHG

## 2021-11-09 DIAGNOSIS — Z00.00 ENCOUNTER FOR GENERAL ADULT MEDICAL EXAMINATION W/OUT ABNORMAL FINDINGS: ICD-10-CM

## 2021-11-09 DIAGNOSIS — E56.9 VITAMIN DEFICIENCY, UNSPECIFIED: ICD-10-CM

## 2021-11-09 DIAGNOSIS — Q22.8 OTHER CONGENITAL MALFORMATIONS OF TRICUSPID VALVE: ICD-10-CM

## 2021-11-09 PROCEDURE — 99215 OFFICE O/P EST HI 40 MIN: CPT

## 2021-11-09 PROCEDURE — 93000 ELECTROCARDIOGRAM COMPLETE: CPT

## 2021-11-09 RX ORDER — MOMETASONE FUROATE 1 MG/G
0.1 OINTMENT TOPICAL
Refills: 0 | Status: DISCONTINUED | COMMUNITY
Start: 2019-01-19 | End: 2021-11-09

## 2021-11-09 RX ORDER — GENTAMICIN SULFATE 1 MG/G
0.1 OINTMENT TOPICAL
Refills: 0 | Status: DISCONTINUED | COMMUNITY
Start: 2019-01-19 | End: 2021-11-09

## 2021-11-09 NOTE — CARDIOLOGY SUMMARY
[de-identified] : 11 9 2021  Sinus  Rhythm  -Frequent pvcs -ventricular trigeminy \par -Left atrial enlargement. \par  Non specific ST t changes. \par \par ABNORMAL RHYTHM [___] : [unfilled] [de-identified] : 3 mar 2021:  LVEF 49%. grade   diastolic dysfunction . Normal Rv.  no significant valvular abnormality mild PAHz

## 2021-11-09 NOTE — HISTORY OF PRESENT ILLNESS
[FreeTextEntry1] : tricuspid valve vegetation\par \par \par HPI for today:    feels good. no cehst pain. no dyspnea on exertion . no LE edema.   no syncope. no palptiaitons.\par no ankle swelling\par \par \par old note: feels good. no ches tpain. no headaches. no passing. no dizziness,. no weakness.  generalized treamors.\par not on any parkinson meds.\par \par \par \par old note: yovani was seen in hospital. patient had infection of the blood. was on abx. 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 However, we could not rule out vegetations. He was asked to follow up after abx to repeat the TTE and possible FAYE to see any progression of the density and to check if we need to restart abx and treat him as endocarditis. \par \par patient stopped his Abx on thursday. \par

## 2021-11-09 NOTE — DISCUSSION/SUMMARY
[Patient] : the patient [Risks] : risks [Benefits] : benefits [Alternatives] : alternatives [With Me] : with me [___ Year(s)] : in [unfilled] year(s) [FreeTextEntry1] : This is a 68 year old male with Parkinsons, had  bacteremia with tricuspid valve density.\par \par 1) Tricuspid valve density/disorder: No obvious vegetations.  \par 2) cardiomyopathy : LVEF 49% conservative management : patient and family defers any stress test . aspirin and defers statins  \par 3)  LE edema: venous insufficiency.  resolved.    reflux study. ace  wrap. elevate legs. \par conservative management. \par 4)  carotid Duplex. \par Will order and review ECG for the above mentioned diagnosis/condition/symptoms \par

## 2021-11-09 NOTE — REASON FOR VISIT
[FreeTextEntry1] : tricuspid valve vegetation [Follow-Up - From Hospitalization] : follow-up of a recent hospitalization for [FreeTextEntry2] : tricuspid valve vegetation

## 2021-11-22 ENCOUNTER — APPOINTMENT (OUTPATIENT)
Dept: CARDIOLOGY | Facility: CLINIC | Age: 71
End: 2021-11-22
Payer: MEDICARE

## 2021-11-22 PROCEDURE — 93880 EXTRACRANIAL BILAT STUDY: CPT

## 2022-01-08 ENCOUNTER — INPATIENT (INPATIENT)
Facility: HOSPITAL | Age: 72
LOS: 5 days | Discharge: HOME | DRG: 177 | End: 2022-01-14
Attending: STUDENT IN AN ORGANIZED HEALTH CARE EDUCATION/TRAINING PROGRAM | Admitting: STUDENT IN AN ORGANIZED HEALTH CARE EDUCATION/TRAINING PROGRAM
Payer: COMMERCIAL

## 2022-01-08 VITALS
OXYGEN SATURATION: 98 % | HEART RATE: 86 BPM | RESPIRATION RATE: 18 BRPM | HEIGHT: 69 IN | TEMPERATURE: 98 F | SYSTOLIC BLOOD PRESSURE: 134 MMHG | DIASTOLIC BLOOD PRESSURE: 79 MMHG

## 2022-01-08 LAB
APPEARANCE UR: CLEAR — SIGNIFICANT CHANGE UP
APTT BLD: 24.4 SEC — LOW (ref 27.5–35.5)
BASOPHILS # BLD AUTO: 0.02 K/UL — SIGNIFICANT CHANGE UP (ref 0–0.2)
BASOPHILS NFR BLD AUTO: 0.3 % — SIGNIFICANT CHANGE UP (ref 0–2)
BILIRUB UR-MCNC: NEGATIVE — SIGNIFICANT CHANGE UP
COLOR SPEC: YELLOW — SIGNIFICANT CHANGE UP
DIFF PNL FLD: NEGATIVE — SIGNIFICANT CHANGE UP
EOSINOPHIL # BLD AUTO: 0.03 K/UL — SIGNIFICANT CHANGE UP (ref 0–0.5)
EOSINOPHIL NFR BLD AUTO: 0.5 % — SIGNIFICANT CHANGE UP (ref 0–6)
EPI CELLS # UR: SIGNIFICANT CHANGE UP
FLUAV AG NPH QL: SIGNIFICANT CHANGE UP
FLUBV AG NPH QL: SIGNIFICANT CHANGE UP
GLUCOSE UR QL: NEGATIVE MG/DL — SIGNIFICANT CHANGE UP
HCT VFR BLD CALC: 43.1 % — SIGNIFICANT CHANGE UP (ref 39–50)
HGB BLD-MCNC: 13.7 G/DL — SIGNIFICANT CHANGE UP (ref 13–17)
IMM GRANULOCYTES NFR BLD AUTO: 0.3 % — SIGNIFICANT CHANGE UP (ref 0–1.5)
INR BLD: 1.15 RATIO — SIGNIFICANT CHANGE UP (ref 0.88–1.16)
KETONES UR-MCNC: ABNORMAL
LEUKOCYTE ESTERASE UR-ACNC: NEGATIVE — SIGNIFICANT CHANGE UP
LYMPHOCYTES # BLD AUTO: 0.58 K/UL — LOW (ref 1–3.3)
LYMPHOCYTES # BLD AUTO: 9.9 % — LOW (ref 13–44)
MCHC RBC-ENTMCNC: 28.2 PG — SIGNIFICANT CHANGE UP (ref 27–34)
MCHC RBC-ENTMCNC: 31.8 GM/DL — LOW (ref 32–36)
MCV RBC AUTO: 88.7 FL — SIGNIFICANT CHANGE UP (ref 80–100)
MONOCYTES # BLD AUTO: 0.51 K/UL — SIGNIFICANT CHANGE UP (ref 0–0.9)
MONOCYTES NFR BLD AUTO: 8.7 % — SIGNIFICANT CHANGE UP (ref 2–14)
NEUTROPHILS # BLD AUTO: 4.71 K/UL — SIGNIFICANT CHANGE UP (ref 1.8–7.4)
NEUTROPHILS NFR BLD AUTO: 80.3 % — HIGH (ref 43–77)
NITRITE UR-MCNC: NEGATIVE — SIGNIFICANT CHANGE UP
PH UR: 6 — SIGNIFICANT CHANGE UP (ref 5–8)
PLATELET # BLD AUTO: 191 K/UL — SIGNIFICANT CHANGE UP (ref 150–400)
PROT UR-MCNC: 15
PROTHROM AB SERPL-ACNC: 13.2 SEC — SIGNIFICANT CHANGE UP (ref 10.6–13.6)
RBC # BLD: 4.86 M/UL — SIGNIFICANT CHANGE UP (ref 4.2–5.8)
RBC # FLD: 13.8 % — SIGNIFICANT CHANGE UP (ref 10.3–14.5)
RSV RNA NPH QL NAA+NON-PROBE: SIGNIFICANT CHANGE UP
SARS-COV-2 RNA SPEC QL NAA+PROBE: DETECTED
SP GR SPEC: 1.02 — SIGNIFICANT CHANGE UP (ref 1.01–1.02)
TROPONIN T SERPL-MCNC: <0.01 NG/ML — SIGNIFICANT CHANGE UP (ref 0–0.06)
UROBILINOGEN FLD QL: 1 MG/DL
WBC # BLD: 5.87 K/UL — SIGNIFICANT CHANGE UP (ref 3.8–10.5)
WBC # FLD AUTO: 5.87 K/UL — SIGNIFICANT CHANGE UP (ref 3.8–10.5)

## 2022-01-08 PROCEDURE — 99285 EMERGENCY DEPT VISIT HI MDM: CPT

## 2022-01-08 PROCEDURE — 71045 X-RAY EXAM CHEST 1 VIEW: CPT | Mod: 26

## 2022-01-08 PROCEDURE — 72131 CT LUMBAR SPINE W/O DYE: CPT | Mod: 26,MG

## 2022-01-08 PROCEDURE — 70450 CT HEAD/BRAIN W/O DYE: CPT | Mod: 26,MG

## 2022-01-08 PROCEDURE — 72170 X-RAY EXAM OF PELVIS: CPT | Mod: 26

## 2022-01-08 PROCEDURE — 72125 CT NECK SPINE W/O DYE: CPT | Mod: 26,MG

## 2022-01-08 PROCEDURE — 73030 X-RAY EXAM OF SHOULDER: CPT | Mod: 26,RT

## 2022-01-08 PROCEDURE — G1004: CPT

## 2022-01-08 NOTE — ED PROVIDER NOTE - NSICDXFAMILYHX_GEN_ALL_CORE_FT
FAMILY HISTORY:  Mother  Still living? Unknown  Family history of Alzheimer's disease, Age at diagnosis: Age Unknown

## 2022-01-08 NOTE — ED ADULT NURSE REASSESSMENT NOTE - NS ED NURSE REASSESS COMMENT FT1
Pt A&O x 3 (disoriented to time) comfortable, denies complaints at this time. Denies pain. Nonslip footwear. Bed locked and in lowest position. Call bell within reach. Frequent checks made. Will continue to monitor.

## 2022-01-08 NOTE — ED PROVIDER NOTE - PHYSICAL EXAMINATION
Gen: no acute distress  Head: normocephalic, atraumatic, negative jones sign, no periorbital ecchymosis  EENT: Right eye; lateral gaze palsy (chronic); no c-spine tenderness  Lung: no increased work of breathing, CTABL  CV: normal s1/s2, rrr, 2+ radial pulses b/l  Abd: soft, non-tender, non-distended, no rebound tenderness or guarding  MSK: No edema, no visible deformities,   Neuro: resting tremor b/l; r  Skin: No rash   Psych: normal affect Gen: no acute distress  Head: normocephalic, atraumatic, negative jones sign, no periorbital ecchymosis  EENT: Right eye; lateral gaze palsy (chronic); no c-spine tenderness  Lung: no increased work of breathing, CTABL  CV: normal s1/s2, rrr, 2+ radial pulses b/l  Abd: soft, non-tender, non-distended, no rebound tenderness or guarding  MSK: No edema, no visible deformities,   Neuro: resting tremor b/l; rigid UEs and LEs; A&Ox2  Psych: flat affect

## 2022-01-08 NOTE — ED ADULT NURSE REASSESSMENT NOTE - NS ED NURSE REASSESS COMMENT FT1
Pt's daughter Alejandra Phillips called to report pt has been taking ciprofloxacin 500mg PO for UTI; Dr. Sepulveda made aware and verbalized understanding.

## 2022-01-08 NOTE — ED ADULT TRIAGE NOTE - CHIEF COMPLAINT QUOTE
Patient BIBA to ED today from home with c/o recent falls at home as per son, patient hx Parkinsons and is COVID-19 positive.

## 2022-01-08 NOTE — ED PROVIDER NOTE - ATTENDING CONTRIBUTION TO CARE
I, Richy Gutierrez, personally saw the patient with the resident, and completed the key components of the history and physical exam. I then discussed the management plan with the resident.    70 yo m hx of parkinson not on meds send in by family for multiple falls. not on anticoagulation. baseline dementia. no deformity. patient had right shoulder pain, lower back pain. will get blood work, CT head, cervical spine, lumbar spine. Will need PT consult and placement.

## 2022-01-08 NOTE — ED PROVIDER NOTE - OBJECTIVE STATEMENT
71yM with   tested COVID+, monday monoclonal antibodies, can't get up on his own, not feeding, can't go to bathroom;   has h/o of falling but normally able to do ADLs, needs assistance walking; lives with wife (COVID+ at home); fell 5:30am and 8:30am unwitnessed fall in the dark; has fallen 7-8 times this past 2 weeks;   PMH: Parkinson's; Dementia  Meds: doxeptin; no parkinson's meds - made him comatose; no blood thinner   PSH:  allergies: pencillin (unsure)  UA: cipro for 71yM with Parkinson's disease and dementia presenting with multiple fall. Patient lives with wife and at baseline ambulates unassisted and performs ADLs on his own. Over the past few weeks family has noted that patient has had more difficulty walking, feeding himself, and going to the bathroom on time. Patient not currently on Parkinson's meds b/c per family "put him in coma" last time. Tremor and shuffling of patient's feet has gotten worse. Family notes patient has had 7-8 unwitnessed falls over the past 2 weeks. Unsure if has head head strike or LOC.   tested COVID+, monday monoclonal antibodies, can't get up on his own, not feeding, can't go to bathroom;   has h/o of falling but normally able to do ADLs, needs assistance walking; lives with wife (COVID+ at home); fell 5:30am and 8:30am unwitnessed fall in the dark; has fallen 7-8 times this past 2 weeks;   PMH: Parkinson's; Dementia  Meds: doxeptin; no parkinson's meds - made him comatose; no blood thinner   PSH:  allergies: pencillin (unsure)  UA: cipro for 71yM with Parkinson's disease and dementia presenting with multiple fall. Patient lives with wife and at baseline ambulates unassisted and performs ADLs on his own. Over the past few weeks family has noted that patient has had more difficulty walking, feeding himself, and going to the bathroom on time. Patient not currently on Parkinson's meds b/c per family "put him in coma" last time. Tremor and shuffling of patient's feet has gotten worse. Family notes patient has had 7-8 unwitnessed falls over the past 2 weeks with the last occuring at 8:30am. Unsure if he has had head strike or LOC. Not on AC. Family sick with COVID at home and patient tested COVID+. Received antibodies on 1/3. Denies that patient has been complaining of chest pain or shortness of breath. Currently being treated for uti with cipro. 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 No h/o of stroke. Patient is asking for placement for assistance with care. Full code, Health proxy is wife and daughter Alejandra DeMasi [phone] 71yM with Parkinson's disease and dementia presenting with multiple fall. Patient lives with wife and at baseline ambulates unassisted and performs ADLs on his own. Over the past few weeks family has noted that patient has had more difficulty walking, feeding himself, and going to the bathroom on time. Patient not currently on Parkinson's meds b/c per family "put him in coma" last time. Tremor and shuffling of patient's feet has gotten worse. Family notes patient has had 7-8 unwitnessed falls over the past 2 weeks with the last occuring at 8:30am. Unsure if he has had head strike or LOC. Not on AC. Family sick with COVID at home and patient tested COVID+. Received antibodies on 1/3. Denies that patient has been complaining of chest pain or shortness of breath. Currently being treated for uti with cipro. 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 No h/o of stroke. Patient is asking for placement for assistance with care. Full code, Health proxy is wife and daughter Alejandra DeMasi [phone]. Patient with A&Ox2 at baseline.

## 2022-01-08 NOTE — ED PROVIDER NOTE - PROGRESS NOTE DETAILS
Jocelyne Sepulveda, Resident: Pt hemodynamically stable at this time. Placed in observation for PT evaluation and likely placement.

## 2022-01-08 NOTE — ED PROVIDER NOTE - CLINICAL SUMMARY MEDICAL DECISION MAKING FREE TEXT BOX
71yM with Parkinson's disease and dementia presenting with multiple fall. Patient c/o of pain in lumbar spine, right shoulder, and left hip after fall. Right eye deviation on primary gaze chronic per daughter. Patient sent in for placement due to difficulties caring for patient in the setting of multiple falls. Patient not on parkinson's meds. Will obtain CTH and neck, CT lumbar spine, xrays to eval for fracture after fall. Labs, EKG, ua for screening for placement. Plan discussed with family who understands patient will need to stay in obs while he is being placed.

## 2022-01-09 DIAGNOSIS — Z98.890 OTHER SPECIFIED POSTPROCEDURAL STATES: Chronic | ICD-10-CM
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
## 2022-01-09 LAB
MAGNESIUM SERPL-MCNC: 2 MG/DL — SIGNIFICANT CHANGE UP (ref 1.6–2.6)
PHOSPHATE SERPL-MCNC: 2.6 MG/DL — SIGNIFICANT CHANGE UP (ref 2.4–4.7)
TROPONIN T SERPL-MCNC: <0.01 NG/ML — SIGNIFICANT CHANGE UP (ref 0–0.06)

## 2022-01-09 PROCEDURE — 99236 HOSP IP/OBS SAME DATE HI 85: CPT

## 2022-01-09 PROCEDURE — 93010 ELECTROCARDIOGRAM REPORT: CPT

## 2022-01-09 PROCEDURE — 99223 1ST HOSP IP/OBS HIGH 75: CPT

## 2022-01-09 RX ORDER — CIPROFLOXACIN LACTATE 400MG/40ML
500 VIAL (ML) INTRAVENOUS EVERY 12 HOURS
Refills: 0 | Status: DISCONTINUED | OUTPATIENT
Start: 2022-01-09 | End: 2022-01-09

## 2022-01-09 RX ORDER — CHOLECALCIFEROL (VITAMIN D3) 125 MCG
1000 CAPSULE ORAL DAILY
Refills: 0 | Status: DISCONTINUED | OUTPATIENT
Start: 2022-01-09 | End: 2022-01-14

## 2022-01-09 RX ORDER — CIPROFLOXACIN LACTATE 400MG/40ML
200 VIAL (ML) INTRAVENOUS EVERY 12 HOURS
Refills: 0 | Status: COMPLETED | OUTPATIENT
Start: 2022-01-09 | End: 2022-01-11

## 2022-01-09 RX ORDER — ASPIRIN/CALCIUM CARB/MAGNESIUM 324 MG
81 TABLET ORAL DAILY
Refills: 0 | Status: DISCONTINUED | OUTPATIENT
Start: 2022-01-09 | End: 2022-01-14

## 2022-01-09 RX ORDER — ENOXAPARIN SODIUM 100 MG/ML
40 INJECTION SUBCUTANEOUS DAILY
Refills: 0 | Status: DISCONTINUED | OUTPATIENT
Start: 2022-01-09 | End: 2022-01-14

## 2022-01-09 RX ORDER — CIPROFLOXACIN LACTATE 400MG/40ML
1 VIAL (ML) INTRAVENOUS
Qty: 0 | Refills: 0 | DISCHARGE
End: 2022-01-11

## 2022-01-09 RX ORDER — DOXEPIN HCL 100 MG
10 CAPSULE ORAL AT BEDTIME
Refills: 0 | Status: DISCONTINUED | OUTPATIENT
Start: 2022-01-09 | End: 2022-01-14

## 2022-01-09 RX ORDER — ACETAMINOPHEN 500 MG
650 TABLET ORAL EVERY 6 HOURS
Refills: 0 | Status: DISCONTINUED | OUTPATIENT
Start: 2022-01-09 | End: 2022-01-14

## 2022-01-09 RX ORDER — ZINC SULFATE TAB 220 MG (50 MG ZINC EQUIVALENT) 220 (50 ZN) MG
220 TAB ORAL DAILY
Refills: 0 | Status: DISCONTINUED | OUTPATIENT
Start: 2022-01-09 | End: 2022-01-14

## 2022-01-09 RX ORDER — VITAMIN E 100 UNIT
400 CAPSULE ORAL
Qty: 0 | Refills: 0 | DISCHARGE

## 2022-01-09 RX ORDER — ASCORBIC ACID 60 MG
500 TABLET,CHEWABLE ORAL DAILY
Refills: 0 | Status: DISCONTINUED | OUTPATIENT
Start: 2022-01-09 | End: 2022-01-14

## 2022-01-09 RX ADMIN — Medication 100 MILLIGRAM(S): at 22:33

## 2022-01-09 RX ADMIN — Medication 1000 UNIT(S): at 11:31

## 2022-01-09 RX ADMIN — Medication 500 MILLIGRAM(S): at 06:07

## 2022-01-09 RX ADMIN — Medication 1 TABLET(S): at 11:30

## 2022-01-09 RX ADMIN — Medication 10 MILLIGRAM(S): at 22:34

## 2022-01-09 NOTE — ED CDU PROVIDER INITIAL DAY NOTE - OBJECTIVE STATEMENT
71yM with Parkinson's disease and dementia presenting with multiple fall. Patient lives with wife and at baseline ambulates unassisted and performs ADLs on his own. Over the past few weeks family has noted that patient has had more difficulty walking, feeding himself, and going to the bathroom on time. Patient not currently on Parkinson's meds d/c per family "put him in coma" last time. Tremor and shuffling of patient's feet has gotten worse. Family notes patient has had 7-8 unwitnessed falls over the past 2 weeks with the last occurring at 8:30am. Unsure if he has had head strike or LOC. Not on AC. Family sick with COVID at home and patient tested COVID+. Received antibodies on 1/3. Denies that patient has been complaining of chest pain or shortness of breath. Currently being treated for uti with cipro since 4 days ago . 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 No h/o of stroke. Patient is asking for placement for assistance with care. Full code, Health proxy is wife and daughter Alejandra DeMasi [phone]. Patient with A&Ox2 at baseline.  as per family he is on Doxepin 10Mg at the bed time for insomnia

## 2022-01-09 NOTE — ED CDU PROVIDER INITIAL DAY NOTE - ATTENDING CONTRIBUTION TO CARE
I personally saw the patient with the PA, and completed the key components of the history and physical exam. I then discussed the management plan with the PA.   gen in nad resp clear cardiac no mumur abd soft neuro intact   \

## 2022-01-09 NOTE — PROVIDER CONTACT NOTE (OTHER) - ASSESSMENT
Pt requires mod-maxA for bed mobility and modA for transfers. Pt with poor standing balance with significant retropulsion unable to be corrected at this time despite multiple cues/attempts. Therefore unable to have the proper posture to take a step at this time.

## 2022-01-09 NOTE — ED CDU PROVIDER DISPOSITION NOTE - ATTENDING CONTRIBUTION TO CARE
Pt. found to be covid positive. Pt. will require admission. I, Dr. Castillo, performed a face to face bedside interview with this patient regarding history of present illness, review of symptoms and relevant past medical, social and family history.  I completed an independent physical examination.  I have also reviewed the ACP's note(s) and discussed the plan with the ACP.

## 2022-01-09 NOTE — ED ADULT NURSE REASSESSMENT NOTE - NS ED NURSE REASSESS COMMENT FT1
Patient cleaned up in bed, condom catheter put in place, made more comfortable in bed. No distress noted. VSS. Respirations unlabored. . Cardiac monitor in place,  Call bell and personal items in reach. Continue to monitor patient and maintain safety. Patient cleaned up in bed, condom catheter put in place, made more comfortable in bed. No distress noted. VSS. Respirations unlabored. . Cardiac monitor in place,  Call bell and personal items in reach. Continue to monitor patient and maintain safety. pt AOX2, meal provided, pt had no difficulty eating, plan of care explained, pt verbalized understanding.

## 2022-01-09 NOTE — ED CDU PROVIDER INITIAL DAY NOTE - PHYSICAL EXAMINATION
Gen: no acute distress on the cardiac monitoring , tremor equal B.l   Head: normocephalic, atraumatic, negative jones sign, no periorbital ecchymosis  EENT: Right eye; lateral gaze palsy (chronic);   NECK : no c-spine tenderness ROM grossly intact   Lung: no increased work of breathing, CTABL  CV: normal s1/s2, rrr, 2+ radial pulses b/l No LE edema , chronic LE B/L venous stasis   Abd: soft, non-tender, non-distended, no rebound tenderness or guarding  MSK: No edema, no visible deformities,   Neuro: resting tremor b/l; rigid UEs and LEs; A&Ox2  Psych: flat affect Gen: no acute distress on the cardiac monitoring , tremor equal B.l   Head: normocephalic, atraumatic, negative jones sign, no periorbital ecchymosis  EENT: Right eye; lateral gaze palsy (chronic);   NECK : no c-spine tenderness ROM grossly intact   Lung: no increased work of breathing, CTABL  CV: normal s1/s2, rrr, 2+ radial pulses b/l No LE edema , chronic LE B/L venous stasis   Abd: soft, non-tender, non-distended, no rebound tenderness or guarding  MSK: No edema, no visible deformities noted . Moves all extremities  Neuro: resting tremor b/l; rigid UEs and LEs; A&Ox2  Psych: flat affect

## 2022-01-09 NOTE — H&P ADULT - NSHPLABSRESULTS_GEN_ALL_CORE
LABS:                        13.7   5.87  )-----------( 191      ( 2022 17:09 )             43.1         142  |  105  |  20.0  ----------------------------<  115<H>  4.0   |  24.0  |  0.99    Ca    8.9      2022 18:24  Phos  2.6       Mg     2.0         TPro  7.4  /  Alb  3.7  /  TBili  0.6  /  DBili  x   /  AST  33  /  ALT  27  /  AlkPhos  58  01-08    PT/INR - ( 2022 17:09 )   PT: 13.2 sec;   INR: 1.15 ratio         PTT - ( 2022 17:09 )  PTT:24.4 sec  CARDIAC MARKERS ( 2022 02:00 )  x     / <0.01 ng/mL / x     / x     / x      CARDIAC MARKERS ( 2022 18:24 )  x     / <0.01 ng/mL / x     / x     / x          Urinalysis Basic - ( 2022 22:30 )    Color: Yellow / Appearance: Clear / S.020 / pH: x  Gluc: x / Ketone: Small  / Bili: Negative / Urobili: 1 mg/dL   Blood: x / Protein: 15 / Nitrite: Negative   Leuk Esterase: Negative / RBC: x / WBC x   Sq Epi: x / Non Sq Epi: Occasional / Bacteria: x      < from: CT Cervical Spine No Cont (22 @ 23:37) >    IMPRESSION:  1. No acute intracranial hemorrhage, territorial infarct, mass effect or   calvarial fracture.  2. No cervical spine fracture or traumatic spondylolisthesis.  3. Small left thyroid lobe nodule. Consider a nonemergent thyroid   ultrasound for further evaluation.  4. Scattered peripheral patchy groundglass atelectasis opacities in the   lung apices which is infectious in etiology including atypical/viral   pneumonias such as Covid-19 or inflammatory in etiology.    < end of copied text >    < from: CT Lumbar Spine No Cont (22 @ 23:38) >    IMPRESSION:  No lumbar spine fracture or traumatic spondylolisthesis.    < end of copied text >

## 2022-01-09 NOTE — ED ADULT NURSE REASSESSMENT NOTE - NS ED NURSE REASSESS COMMENT FT1
Pt cleaned and repositioned; denies complaints at this time. Provider aware of VS. Bed locked and in lowest position. Frequent check made. Will continue to monitor.

## 2022-01-09 NOTE — ED CDU PROVIDER INITIAL DAY NOTE - WR ORDER DATE AND TIME 1
08-Jan-2022 16:16 Hatchet Flap Text: The defect edges were debeveled with a #15 scalpel blade.  Given the location of the defect, shape of the defect and the proximity to free margins a hatchet flap was deemed most appropriate.  Using a sterile surgical marker, an appropriate hatchet flap was drawn incorporating the defect and placing the expected incisions within the relaxed skin tension lines where possible.    The area thus outlined was incised deep to adipose tissue with a #15 scalpel blade.  The skin margins were undermined to an appropriate distance in all directions utilizing iris scissors.

## 2022-01-09 NOTE — PHYSICAL THERAPY INITIAL EVALUATION ADULT - PERTINENT HX OF CURRENT PROBLEM, REHAB EVAL
71yM with Parkinson's disease and dementia presenting with multiple fall. Patient lives with wife and at baseline ambulates unassisted and performs ADLs on his own. Over the past few weeks family has noted that patient has had more difficulty walking, feeding himself,and going to the bathroom on time

## 2022-01-09 NOTE — H&P ADULT - ASSESSMENT
71yM with Parkinson's disease and dementia presenting with multiple fall, also COVID+. Here for isolation before placement to [facility].     #Weakness/Fall  - likely 2/2 COVID PNA  - s/p antibodies  - currently on RA, monitor for now  - PT evaluated -> [facility]    #COVID 19:  - trend inflammatory markers  - PRN O2  - no need for Remdesivir/decadron for now  - airborne isolation    #Thyroid nodules:  - f/u AM TSH  - outpatient thyroid ultrasoun     #Parkinson disease/dementia:  - no on medications at home per family  - fall precaution    #UTI:  - to complete Cipro    DVT: Lovenox QD  Diet: regular  Dispo: [facility] after isolation 71yM with Parkinson's disease and dementia presenting with multiple fall, also COVID+. Here for isolation before placement to [facility].     #Weakness/Fall  - likely 2/2 COVID PNA + UTI  - s/p antibodies  - currently on RA, monitor for now  - PT evaluated -> [facility]    #COVID 19:  - trend inflammatory markers  - PRN O2  - no need for Remdesivir/decadron for now  - airborne isolation    #Thyroid nodules:  - f/u AM TSH  - outpatient thyroid ultrasoun     #Parkinson disease/dementia:  - no on medications at home per family  - fall precaution    #UTI:  - to complete Cipro until 1/11    DVT: Lovenox QD  Diet: regular  Dispo: [facility] after isolation     71yM with Parkinson's disease and dementia presenting with multiple fall, also COVID+. Here for isolation before placement to [facility].     #Weakness/Fall  - likely 2/2 COVID PNA + UTI  - s/p antibodies  - currently on RA, monitor for now  - PT evaluated -> [facility]    #COVID 19:  - trend inflammatory markers  - PRN O2  - no need for Remdesivir/decadron for now  - airborne isolation    #Thyroid nodules:  - f/u AM TSH  - outpatient thyroid ultrasoun     #Parkinson disease/dementia:  - no on medications at home per family  - fall precaution    #UTI:  - to complete Cipro until 1/11    DVT: Lovenox QD  Diet: regular  Dispo: [facility] after isolation    Updated daughter Alejandra Phillips [phone] 1/9

## 2022-01-09 NOTE — H&P ADULT - NSICDXPASTSURGICALHX_GEN_ALL_CORE_FT
PAST SURGICAL HISTORY:  No significant past surgical history      PAST SURGICAL HISTORY:  S/P hernia repair

## 2022-01-09 NOTE — H&P ADULT - NSHPPHYSICALEXAM_GEN_ALL_CORE
VITALS:   T(C): 36.7 (01-09-22 @ 07:35), Max: 37.2 (01-08-22 @ 21:38)  HR: 61 (01-09-22 @ 07:35) (61 - 86)  BP: 162/67 (01-09-22 @ 07:35) (131/62 - 165/70)  RR: 18 (01-09-22 @ 07:35) (16 - 18)  SpO2: 97% (01-09-22 @ 07:35) (97% - 98%)    GENERAL: NAD, lying in bed comfortably  HEAD:  Atraumatic, Normocephalic  EYES: EOMI, PERRLA, conjunctiva and sclera clear  ENT: Moist mucous membranes  NECK: Supple, No JVD  CHEST/LUNG: Clear to auscultation bilaterally; No rales, rhonchi, wheezing, or rubs. Unlabored respirations  HEART: Regular rate and rhythm; No murmurs, rubs, or gallops  ABDOMEN: BSx4; Soft, nontender, nondistended  EXTREMITIES:  2+ Peripheral Pulses, brisk capillary refill. No clubbing, cyanosis, or edema  NERVOUS SYSTEM:  A&Ox3, no focal deficits   SKIN: No rashes or lesions  PSYCH: Normal affect, euthymic mood VITALS:   T(C): 36.7 (01-09-22 @ 07:35), Max: 37.2 (01-08-22 @ 21:38)  HR: 61 (01-09-22 @ 07:35) (61 - 86)  BP: 162/67 (01-09-22 @ 07:35) (131/62 - 165/70)  RR: 18 (01-09-22 @ 07:35) (16 - 18)  SpO2: 97% (01-09-22 @ 07:35) (97% - 98%)    GENERAL: NAD, lying in bed comfortably  HEAD:  Atraumatic, Normocephalic  EYES: pupil equal/reactive, conjunctiva and sclera clear  ENT: Moist mucous membranes  NECK: Supple, No JVD  CHEST/LUNG: Clear to auscultation bilaterally; No rales, rhonchi, wheezing, or rubs. Unlabored respirations  HEART: Regular rate and rhythm; No murmurs, rubs, or gallops  ABDOMEN: BSx4; Soft, nontender, nondistended  EXTREMITIES:  2+ Peripheral Pulses, brisk capillary refill. No clubbing, cyanosis, or edema  NERVOUS SYSTEM:  A&Ox3, no focal deficits   SKIN: No rashes or lesions  PSYCH: Normal affect, euthymic mood VITALS:   T(C): 36.7 (01-09-22 @ 07:35), Max: 37.2 (01-08-22 @ 21:38)  HR: 61 (01-09-22 @ 07:35) (61 - 86)  BP: 162/67 (01-09-22 @ 07:35) (131/62 - 165/70)  RR: 18 (01-09-22 @ 07:35) (16 - 18)  SpO2: 97% (01-09-22 @ 07:35) (97% - 98%)    GENERAL: NAD, lying in bed comfortably  HEAD:  Atraumatic, Normocephalic  EYES: pupil equal/reactive, conjunctiva and sclera clear  ENT: Moist mucous membranes  NECK: Supple, No JVD  CHEST/LUNG: Clear to auscultation bilaterally; No rales, rhonchi, wheezing, or rubs. Unlabored respirations  HEART: Regular rate and rhythm; No murmurs, rubs, or gallops  ABDOMEN: BSx4; Soft, nontender, nondistended  EXTREMITIES:  2+ Peripheral Pulses, brisk capillary refill. No clubbing, cyanosis, or edema  NERVOUS SYSTEM:  A&Ox3, no focal deficits   SKIN: No rashes or lesions  PSYCH: Normal affect, euthymic mood. Baseline speech garble

## 2022-01-09 NOTE — ED CDU PROVIDER INITIAL DAY NOTE - PROGRESS NOTE DETAILS
Pt is on Doxepin 10mg at the bed time . tried to ordered [facility] pharmacy dose not carry sp dose   called back the pt daughter to make them aware that should bring the home med , can not reach now xray shoulder and pelvis negative for acute fracture - lizzy roberts PT recommending [facility], CM made aware - lizzy roberts

## 2022-01-09 NOTE — ED CDU PROVIDER INITIAL DAY NOTE - MEDICAL DECISION MAKING DETAILS
71yM with Parkinson's disease and dementia presenting with multiple fall. Patient lives with wife and at baseline ambulates unassisted and performs ADLs on his own. Over the past few weeks family has noted that patient has had more difficulty walking, feeding himself,and going to the bathroom on time. Patient not currently on Parkinson's meds d/c per family "put him in coma" last time. Tremor and shuffling of patient's feet has gotten worse.    More frequent fall at home HX of dementia/ parkinson's   PT consult   will repeat the labs and EKg   Case management as per daughter they can not take care her father . NEED long term care and she is proxy of the father  pt had foul smell on his urin + Uti on cipro by urgent care since last Wednesday will cont for 2 days 71yM with Parkinson's disease and dementia presenting with multiple fall. Patient lives with wife and at baseline ambulates unassisted and performs ADLs on his own. Over the past few weeks family has noted that patient has had more difficulty walking, feeding himself,and going to the bathroom on time. Patient not currently on Parkinson's meds d/c per family "put him in coma" last time. Tremor and shuffling of patient's feet has gotten worse.    frequent fall at home HX of dementia/ parkinson's   PT consult   will repeat the labs and EKg   Case management as per daughter they can not take care her father . NEED long term care and she is proxy of the father  pt had foul smell on his urin + Uti on cipro by urgent care since last Wednesday will cont for 2 days

## 2022-01-09 NOTE — ED ADULT NURSE REASSESSMENT NOTE - NS ED NURSE REASSESS COMMENT FT1
PT cleaned and repositioned. Pt resting comfortably on stretcher. Bed locked and in lowest position. Will continue to monitor.

## 2022-01-09 NOTE — ED CDU PROVIDER DISPOSITION NOTE - CLINICAL COURSE
Fax received from [facility] requesting Gabapentin Sig clarification. New Rx sent with correct Sig.
71yM with Parkinson's disease and dementia presenting with multiple fall placed into observation for [facility]. Pt found to be Covid + which requires him to remain in hospital x 5 days before placement. Pt admitted

## 2022-01-09 NOTE — H&P ADULT - NSHPPOADEEPVENOUSTHROMB_GEN_A_CORE
Surgery First Assist Note


First Assist: Soo Mejía PA-C


Date of Service: 04/23/19


Diagnosis: 





endometrial cancer


Procedure: 





robotic assited laparoscopic CANDELARIO/BSO with pelvic washings, lymph node 

dissection and omentectomy


I was present for the entirety of the operative procedure. For further detail, 

please refer to operative report.








Visit type





- Case Type


Case Type: Scheduled





- Emergency


Emergency Visit: No





- New patient


This patient is new to me today: Yes


Date on this admission: 04/23/19 no

## 2022-01-09 NOTE — PHYSICAL THERAPY INITIAL EVALUATION ADULT - ADDITIONAL COMMENTS
[FreeTextEntry1] : Diverticulitis status post resection and ileostomy\par -Diet as tolerated\par -Restart Imodium and track output\par -Patient to schedule flexible sigmoidoscopy in 4 weeks Without anesthesia\par -Call this is increased stoma output after Imodium
Pt is a questionable historian. Pt reports living in private home with wife who is able to assist. Pt has 3 small steps to enter and no stairs inside. Independent prior to admission. Sometimes uses a RW.

## 2022-01-09 NOTE — ED CDU PROVIDER INITIAL DAY NOTE - CROS ED RESP ALL NEG
Maris Garcia from [facility] is calling to report that they have a Morphine allergy listed on file at [facility]  Reviewed our Epic allergy list with Maris Garcia  Codeine and Oxycodone reactions in Epic- Lethargic  Maris Garcia is removing Morphine as an allergy and making the notation that patient's reaction is "lethargic" with morphine 
negative...

## 2022-01-09 NOTE — PROVIDER CONTACT NOTE (OTHER) - SITUATION
chart reviewed, contents noted, orders received. Pt pending results of x-rays of shoulder & pelvis to r/o fractures. PT to hold eval until results are posted. Thank you
chart reviewed, contents noted, orders received. PT eval completed & documented see note for details. Pt received/returned to stretcher in NAD. Pt with 0/10 pain pre/post eval.

## 2022-01-09 NOTE — PHYSICAL THERAPY INITIAL EVALUATION ADULT - LEVEL OF INDEPENDENCE: GAIT, REHAB EVAL
pt with significantly retropulsion and feet sliding, unable to correct despite multiple attempts with various cues./unable to perform

## 2022-01-09 NOTE — H&P ADULT - NSHPREVIEWOFSYSTEMS_GEN_ALL_CORE
REVIEW OF SYSTEMS:    CONSTITUTIONAL: + weakness and fall, no fevers or chills  EYES: No vertigo or throat pain  ENT: No visual changes, eye pain  MOUTH: moist ct mucosal, no mouth ulcers  NECK: No pain or stiffness  RESPIRATORY: No cough, wheezing, hemoptysis; No shortness of breath  CARDIOVASCULAR: No chest pain or palpitations  GASTROINTESTINAL: No abdominal or epigastric pain. No nausea, vomiting, or hematemesis; No diarrhea or constipation. No melena or hematochezia.  GENITOURINARY: No dysuria, frequency or hematuria  NEUROLOGICAL: No numbness or weakness  SKIN: No itching, rashes  PSYCH: No anxiety or depression

## 2022-01-09 NOTE — ED ADULT NURSE REASSESSMENT NOTE - NS ED NURSE REASSESS COMMENT FT1
Patient received at 0730; awake; alert and oriented x2. Denies SOB, dizziness. No distress noted. VSS. Respirations unlabored. Report received at bedside. Cardiac monitor in place. Rhythm reviewed and report given to monitor tech. Call bell and personal items in reach. Continue to monitor patient and maintain safety.

## 2022-01-09 NOTE — H&P ADULT - HISTORY OF PRESENT ILLNESS
71yM with Parkinson's disease and dementia (AAOx2) presenting with multiple fall. Patient lives with wife and at baseline ambulates unassisted and performs ADLs on his own. Over the past few weeks family has noted that patient has had more difficulty walking, feeding himself, and going to the bathroom on time. Patient not currently on Parkinson's meds b/c per family "put him in coma" last time. Tremor and shuffling of patient's feet has gotten worse. Family notes patient has had 7-8 unwitnessed falls over the past 2 weeks with the last occuring at 8:30am. Unsure if he has had head strike or LOC. Not on AC. Family sick with COVID at home and patient tested COVID+. Received antibodies on 1/3. Denies that patient has been complaining of chest pain or shortness of breath. Currently being treated for uti with cipro. 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 No h/o of stroke. Patient is asking for placement for assistance with care.     ED vitals stable. CT spine/head without acute pathologies. Given +COVID, will admit, pending placement. 71yM with Parkinson's disease (no meds as per family, meds put him in "coma" before) and dementia (AAOx2), recent UTI (on Cipro for 7 days until 1/11) presenting with multiple fall over the last two weeks, unclear LOC. Baseline independent, over the past few weeks family has noted that patient has had more difficulty walking, feeding himself, and going to the bathroom on time. Also noted worsening tremor and shuffling of the feet. Of note, patient is COVID+ with positive family exposure and s/p antibodies on 1/3.   Current ROS negative for chest pain or shortness of breath.      ED vitals stable. CT spine/head without acute pathologies. Given +COVID, will admit, pending placement. 71yM with Parkinson's disease (no meds as per family, meds put him in "coma" before) and dementia (AAOx2), recent UTI (on Cipro for 7 days until 1/11) presenting with multiple fall over the last two weeks, unclear if there was LOC. Baseline independent, sometimes uses walker to get around. Patient noted by families to have more difficulty ambulating. Also noted worsening tremor and shuffling of the feet. Of note, patient is COVID+ with positive family exposure and s/p antibodies on 1/3.   Current ROS negative for chest pain or shortness of breath, weakness, n/v/c/d nor urinary symptoms.      ED vitals stable. CT spine/head without acute pathologies. Given +COVID, will admit, pending placement. 71yM with Parkinson's disease (no meds as per family, meds put him in "coma" before) and dementia (AAOx2), recent UTI (on Cipro for 7 days until 1/11) presenting with multiple fall over the last two weeks, no head trauma. Baseline relatively independent, sometimes uses walker to get around. Patient noted by families to have more difficulty ambulating over the last few weeks. Also noted worsening tremor and shuffling of the feet. Of note, patient is COVID+ with positive family exposure and s/p monoclonal antibodies on 1/3. Continues to have worsening ataxia after the antibodies. Hence the ED visit.   Current ROS negative for chest pain or shortness of breath, weakness, n/v/c/d nor urinary symptoms.      ED vitals stable. CT spine/head without acute pathologies. Given +COVID, will admit, pending placement.

## 2022-01-09 NOTE — H&P ADULT - NSHPSOCIALHISTORY_GEN_ALL_CORE
Lives at home with wife. Doing all ADLs, IADLs at home. Lives at home with wife. Doing all ADLs, IADLs at home.  Denies smoking, ETOH use.   Occasionally ambulatory with walker at home

## 2022-01-10 LAB
ACANTHOCYTES BLD QL SMEAR: SLIGHT — SIGNIFICANT CHANGE UP
ALBUMIN SERPL ELPH-MCNC: 3.8 G/DL — SIGNIFICANT CHANGE UP (ref 3.3–5.2)
ALP SERPL-CCNC: 53 U/L — SIGNIFICANT CHANGE UP (ref 40–120)
ALT FLD-CCNC: 26 U/L — SIGNIFICANT CHANGE UP
ANION GAP SERPL CALC-SCNC: 11 MMOL/L — SIGNIFICANT CHANGE UP (ref 5–17)
ANISOCYTOSIS BLD QL: SLIGHT — SIGNIFICANT CHANGE UP
AST SERPL-CCNC: 35 U/L — SIGNIFICANT CHANGE UP
BASOPHILS # BLD AUTO: 0 K/UL — SIGNIFICANT CHANGE UP (ref 0–0.2)
BASOPHILS NFR BLD AUTO: 0 % — SIGNIFICANT CHANGE UP (ref 0–2)
BILIRUB SERPL-MCNC: 0.8 MG/DL — SIGNIFICANT CHANGE UP (ref 0.4–2)
BUN SERPL-MCNC: 16.6 MG/DL — SIGNIFICANT CHANGE UP (ref 8–20)
CALCIUM SERPL-MCNC: 9 MG/DL — SIGNIFICANT CHANGE UP (ref 8.6–10.2)
CHLORIDE SERPL-SCNC: 103 MMOL/L — SIGNIFICANT CHANGE UP (ref 98–107)
CO2 SERPL-SCNC: 27 MMOL/L — SIGNIFICANT CHANGE UP (ref 22–29)
CREAT SERPL-MCNC: 0.95 MG/DL — SIGNIFICANT CHANGE UP (ref 0.5–1.3)
CULTURE RESULTS: NO GROWTH — SIGNIFICANT CHANGE UP
ELLIPTOCYTES BLD QL SMEAR: SLIGHT — SIGNIFICANT CHANGE UP
EOSINOPHIL # BLD AUTO: 0 K/UL — SIGNIFICANT CHANGE UP (ref 0–0.5)
EOSINOPHIL NFR BLD AUTO: 0 % — SIGNIFICANT CHANGE UP (ref 0–6)
GIANT PLATELETS BLD QL SMEAR: PRESENT — SIGNIFICANT CHANGE UP
GLUCOSE SERPL-MCNC: 113 MG/DL — HIGH (ref 70–99)
HCT VFR BLD CALC: 41.7 % — SIGNIFICANT CHANGE UP (ref 39–50)
HCV AB S/CO SERPL IA: 0.31 S/CO — SIGNIFICANT CHANGE UP (ref 0–0.99)
HCV AB SERPL-IMP: SIGNIFICANT CHANGE UP
HGB BLD-MCNC: 13.2 G/DL — SIGNIFICANT CHANGE UP (ref 13–17)
LYMPHOCYTES # BLD AUTO: 0.66 K/UL — LOW (ref 1–3.3)
LYMPHOCYTES # BLD AUTO: 8.7 % — LOW (ref 13–44)
MACROCYTES BLD QL: SLIGHT — SIGNIFICANT CHANGE UP
MAGNESIUM SERPL-MCNC: 2 MG/DL — SIGNIFICANT CHANGE UP (ref 1.8–2.6)
MANUAL SMEAR VERIFICATION: SIGNIFICANT CHANGE UP
MCHC RBC-ENTMCNC: 28.3 PG — SIGNIFICANT CHANGE UP (ref 27–34)
MCHC RBC-ENTMCNC: 31.7 GM/DL — LOW (ref 32–36)
MCV RBC AUTO: 89.3 FL — SIGNIFICANT CHANGE UP (ref 80–100)
MONOCYTES # BLD AUTO: 0.52 K/UL — SIGNIFICANT CHANGE UP (ref 0–0.9)
MONOCYTES NFR BLD AUTO: 6.9 % — SIGNIFICANT CHANGE UP (ref 2–14)
NEUTROPHILS # BLD AUTO: 6.33 K/UL — SIGNIFICANT CHANGE UP (ref 1.8–7.4)
NEUTROPHILS NFR BLD AUTO: 83.5 % — HIGH (ref 43–77)
OVALOCYTES BLD QL SMEAR: SLIGHT — SIGNIFICANT CHANGE UP
PHOSPHATE SERPL-MCNC: 2.8 MG/DL — SIGNIFICANT CHANGE UP (ref 2.4–4.7)
PLAT MORPH BLD: NORMAL — SIGNIFICANT CHANGE UP
PLATELET # BLD AUTO: 213 K/UL — SIGNIFICANT CHANGE UP (ref 150–400)
POIKILOCYTOSIS BLD QL AUTO: SIGNIFICANT CHANGE UP
POLYCHROMASIA BLD QL SMEAR: SLIGHT — SIGNIFICANT CHANGE UP
POTASSIUM SERPL-MCNC: 4.2 MMOL/L — SIGNIFICANT CHANGE UP (ref 3.5–5.3)
POTASSIUM SERPL-SCNC: 4.2 MMOL/L — SIGNIFICANT CHANGE UP (ref 3.5–5.3)
PROT SERPL-MCNC: 7.5 G/DL — SIGNIFICANT CHANGE UP (ref 6.6–8.7)
RBC # BLD: 4.67 M/UL — SIGNIFICANT CHANGE UP (ref 4.2–5.8)
RBC # FLD: 13.4 % — SIGNIFICANT CHANGE UP (ref 10.3–14.5)
RBC BLD AUTO: ABNORMAL
SCHISTOCYTES BLD QL AUTO: SLIGHT — SIGNIFICANT CHANGE UP
SODIUM SERPL-SCNC: 140 MMOL/L — SIGNIFICANT CHANGE UP (ref 135–145)
SPECIMEN SOURCE: SIGNIFICANT CHANGE UP
T4 FREE SERPL-MCNC: 1.6 NG/DL — SIGNIFICANT CHANGE UP (ref 0.9–1.8)
TSH SERPL-MCNC: 0.19 UIU/ML — LOW (ref 0.27–4.2)
VARIANT LYMPHS # BLD: 0.9 % — SIGNIFICANT CHANGE UP (ref 0–6)
WBC # BLD: 7.58 K/UL — SIGNIFICANT CHANGE UP (ref 3.8–10.5)
WBC # FLD AUTO: 7.58 K/UL — SIGNIFICANT CHANGE UP (ref 3.8–10.5)

## 2022-01-10 PROCEDURE — 99232 SBSQ HOSP IP/OBS MODERATE 35: CPT

## 2022-01-10 RX ADMIN — Medication 100 MILLIGRAM(S): at 12:08

## 2022-01-10 RX ADMIN — Medication 500 MILLIGRAM(S): at 12:08

## 2022-01-10 RX ADMIN — Medication 81 MILLIGRAM(S): at 12:08

## 2022-01-10 RX ADMIN — Medication 1 TABLET(S): at 12:08

## 2022-01-10 RX ADMIN — ENOXAPARIN SODIUM 40 MILLIGRAM(S): 100 INJECTION SUBCUTANEOUS at 12:07

## 2022-01-10 RX ADMIN — ZINC SULFATE TAB 220 MG (50 MG ZINC EQUIVALENT) 220 MILLIGRAM(S): 220 (50 ZN) TAB at 17:43

## 2022-01-10 RX ADMIN — Medication 10 MILLIGRAM(S): at 21:22

## 2022-01-10 RX ADMIN — Medication 1000 UNIT(S): at 17:43

## 2022-01-11 ENCOUNTER — TRANSCRIPTION ENCOUNTER (OUTPATIENT)
Age: 72
End: 2022-01-11

## 2022-01-11 PROCEDURE — 99232 SBSQ HOSP IP/OBS MODERATE 35: CPT

## 2022-01-11 RX ORDER — DOXEPIN HCL 100 MG
1 CAPSULE ORAL
Qty: 0 | Refills: 0 | DISCHARGE
Start: 2022-01-11

## 2022-01-11 RX ORDER — CIPROFLOXACIN LACTATE 400MG/40ML
2 VIAL (ML) INTRAVENOUS
Qty: 0 | Refills: 0 | DISCHARGE
End: 2022-01-11

## 2022-01-11 RX ORDER — ASCORBIC ACID 60 MG
0 TABLET,CHEWABLE ORAL
Qty: 0 | Refills: 0 | DISCHARGE

## 2022-01-11 RX ADMIN — Medication 100 MILLIGRAM(S): at 17:57

## 2022-01-11 RX ADMIN — Medication 100 MILLIGRAM(S): at 00:05

## 2022-01-11 RX ADMIN — ZINC SULFATE TAB 220 MG (50 MG ZINC EQUIVALENT) 220 MILLIGRAM(S): 220 (50 ZN) TAB at 17:57

## 2022-01-11 RX ADMIN — Medication 500 MILLIGRAM(S): at 17:55

## 2022-01-11 RX ADMIN — ENOXAPARIN SODIUM 40 MILLIGRAM(S): 100 INJECTION SUBCUTANEOUS at 17:54

## 2022-01-11 RX ADMIN — Medication 10 MILLIGRAM(S): at 21:34

## 2022-01-11 RX ADMIN — Medication 1 TABLET(S): at 17:55

## 2022-01-11 RX ADMIN — Medication 81 MILLIGRAM(S): at 17:54

## 2022-01-11 RX ADMIN — Medication 1000 UNIT(S): at 17:55

## 2022-01-11 RX ADMIN — Medication 100 MILLIGRAM(S): at 05:06

## 2022-01-11 NOTE — DISCHARGE NOTE PROVIDER - NSDCMRMEDTOKEN_GEN_ALL_CORE_FT
aspirin 81 mg oral tablet: 1 tab(s) orally once a day  doxepin 10 mg oral capsule: 1 cap(s) orally once a day (at bedtime)  Vitamin C 100 mg oral tablet: 1 tab(s) orally once a day  Vitamin D3 50,000 intl units oral capsule: 1 cap(s) orally once a week on sat  Zinc 140 mg (as elemental zinc 50 mg) oral tablet: 1 tab(s) orally once a day

## 2022-01-11 NOTE — DISCHARGE NOTE PROVIDER - NSDCCPCAREPLAN_GEN_ALL_CORE_FT
PRINCIPAL DISCHARGE DIAGNOSIS  Diagnosis: Falls  Assessment and Plan of Treatment:       SECONDARY DISCHARGE DIAGNOSES  Diagnosis: 2019 novel coronavirus disease (COVID-19)  Assessment and Plan of Treatment:      PRINCIPAL DISCHARGE DIAGNOSIS  Diagnosis: Falls  Assessment and Plan of Treatment: [facility] on DC      SECONDARY DISCHARGE DIAGNOSES  Diagnosis: 2019 novel coronavirus disease (COVID-19)  Assessment and Plan of Treatment: You have tested POSITIVE for the novel coronavirus (COVID-19). Follow CDC isoloation guidelines.  You should restrict activities outside of your home except for getting medical care. Do not go to work, school or public areas. Avoid using public transportation. Please wear a face mask if you are around other individuals. Try to avoid contact with house members, family, and friends for the duration of this quarantine. Please follow up with your primary care physician within 2-3 weeks of your discharge from [facility]. Please take all medications as prescribed. If you experience any worsening or recurrence of your symptoms, particularly worsening or high fever, shortness of breathe, extreme fatigue, or bloody cough please call 9-1-1 immediately or report to the nearest Emergency Department. If you have any questions or concerns, please do not hesitate to call the hospital at [phone].     PRINCIPAL DISCHARGE DIAGNOSIS  Diagnosis: Falls  Assessment and Plan of Treatment: [facility] on DC      SECONDARY DISCHARGE DIAGNOSES  Diagnosis: 2019 novel coronavirus disease (COVID-19)  Assessment and Plan of Treatment: You have tested POSITIVE for the novel coronavirus (COVID-19). Follow CDC isoloation guidelines.  You should restrict activities outside of your home except for getting medical care. Do not go to work, school or public areas. Avoid using public transportation. Please wear a face mask if you are around other individuals. Try to avoid contact with house members, family, and friends for the duration of this quarantine. Please follow up with your primary care physician within 2-3 weeks of your discharge from [facility]. Please take all medications as prescribed. If you experience any worsening or recurrence of your symptoms, particularly worsening or high fever, shortness of breathe, extreme fatigue, or bloody cough please call 9-1-1 immediately or report to the nearest Emergency Department. If you have any questions or concerns, please do not hesitate to call the hospital at [phone].    Diagnosis: Acute UTI  Assessment and Plan of Treatment: Completed course of abx

## 2022-01-11 NOTE — DISCHARGE NOTE PROVIDER - HOSPITAL COURSE
71yM with Parkinson's disease and dementia presenting with multiple fall, also COVID+. Here for isolation before placement to [facility]. Patient remains on RA during isolation. Stable for discharge to [facility].    Of note, patient was treated for UTI (dx prior to admission) with ciprofloxacin, which was completed on 1/11.    I spent 35 minutes in patient encounter, greater than 50% of the time was spent in counseling/coordination of care/discharge planning. 71yM with Parkinson's disease and dementia presenting with multiple fall, also COVID+. Here for isolation before placement to [facility]. Patient remains on RA during isolation. Stable for discharge to [facility].    Of note, patient was treated for UTI (dx prior to admission) with ciprofloxacin, which was completed on 1/11.    I spent 35 minutes in patient encounter, greater than 50% of the time was spent in counseling/coordination of care/discharge planning.     Vital Signs Last 24 Hrs  T(C): 36.4 (13 Jan 2022 04:06), Max: 36.9 (12 Jan 2022 11:14)  T(F): 97.6 (13 Jan 2022 04:06), Max: 98.5 (12 Jan 2022 11:14)  HR: 87 (13 Jan 2022 04:06) (63 - 87)  BP: 117/75 (13 Jan 2022 04:06) (107/66 - 150/85)  BP(mean): --  RR: 18 (13 Jan 2022 04:06) (18 - 19)  SpO2: 95% (13 Jan 2022 04:06) (92% - 95%) 71yM with Parkinson's disease and dementia presenting with multiple fall, also COVID+. Here for isolation before placement to [facility]. Patient remains on RA during isolation. Stable for discharge to [facility].    Of note, patient was treated for UTI (dx prior to admission) with ciprofloxacin, which was completed on 1/11.    I spent 35 minutes in patient encounter, greater than 50% of the time was spent in counseling/coordination of care/discharge planning.     Vital Signs Last 24 Hrs  T(C): 36.8 (14 Jan 2022 04:56), Max: 36.8 (14 Jan 2022 04:56)  T(F): 98.2 (14 Jan 2022 04:56), Max: 98.2 (14 Jan 2022 04:56)  HR: 62 (14 Jan 2022 04:56) (62 - 90)  BP: 102/66 (14 Jan 2022 04:56) (102/66 - 126/75)  BP(mean): --  RR: 18 (14 Jan 2022 04:56) (18 - 18)  SpO2: 92% (14 Jan 2022 04:56) (92% - 97%)

## 2022-01-11 NOTE — DISCHARGE NOTE PROVIDER - ATTENDING DISCHARGE PHYSICAL EXAMINATION:
patient seen and examined, stable for discharge.     VITALS:   T(C): 36.6 (01-13-22 @ 09:52), Max: 36.6 (01-12-22 @ 20:30)  HR: 90 (01-13-22 @ 09:52) (65 - 90)  BP: 126/75 (01-13-22 @ 09:52) (107/66 - 126/75)  RR: 18 (01-13-22 @ 09:52) (18 - 18)  SpO2: 93% (01-13-22 @ 09:52) (92% - 95%)    GENERAL: NAD, lying in bed comfortably  HEAD:  Atraumatic, Normocephalic  EYES: pupil equal/reactive, conjunctiva and sclera clear  ENT: Moist mucous membranes  NECK: Supple, No JVD  CHEST/LUNG: Clear to auscultation bilaterally; No rales, rhonchi, wheezing, or rubs. Unlabored respirations  HEART: Regular rate and rhythm; No murmurs, rubs, or gallops  ABDOMEN: BSx4; Soft, nontender, nondistended  EXTREMITIES:  2+ Peripheral Pulses, brisk capillary refill. No clubbing, cyanosis, or edema  NERVOUS SYSTEM:  A&Ox3, no focal deficits   SKIN: No rashes or lesions  PSYCH: Normal affect, euthymic mood. Baseline speech garble patient seen and examined, stable for discharge.     VITALS:   Vital Signs Last 24 Hrs  T(C): 37.1 (01-14-22 @ 09:48), Max: 37.1 (01-14-22 @ 09:48)  T(F): 98.8 (01-14-22 @ 09:48), Max: 98.8 (01-14-22 @ 09:48)  HR: 61 (01-14-22 @ 09:48) (61 - 73)  BP: 104/65 (01-14-22 @ 09:48) (102/66 - 114/72)  BP(mean): --  RR: 18 (01-14-22 @ 09:48) (18 - 18)  SpO2: 93% (01-14-22 @ 09:48) (92% - 97%)    GENERAL: NAD, lying in bed comfortably  HEAD:  Atraumatic, Normocephalic  EYES: pupil equal/reactive, conjunctiva and sclera clear  ENT: Moist mucous membranes  NECK: Supple, No JVD  CHEST/LUNG: Clear to auscultation bilaterally; No rales, rhonchi, wheezing, or rubs. Unlabored respirations  HEART: Regular rate and rhythm; No murmurs, rubs, or gallops  ABDOMEN: BSx4; Soft, nontender, nondistended  EXTREMITIES:  2+ Peripheral Pulses, brisk capillary refill. No clubbing, cyanosis, or edema  NERVOUS SYSTEM:  A&Ox3, no focal deficits   SKIN: No rashes or lesions  PSYCH: Normal affect, euthymic mood. Baseline speech garble

## 2022-01-12 LAB — SARS-COV-2 RNA SPEC QL NAA+PROBE: DETECTED

## 2022-01-12 PROCEDURE — 99232 SBSQ HOSP IP/OBS MODERATE 35: CPT

## 2022-01-12 RX ADMIN — Medication 500 MILLIGRAM(S): at 12:47

## 2022-01-12 RX ADMIN — ENOXAPARIN SODIUM 40 MILLIGRAM(S): 100 INJECTION SUBCUTANEOUS at 12:47

## 2022-01-12 RX ADMIN — ZINC SULFATE TAB 220 MG (50 MG ZINC EQUIVALENT) 220 MILLIGRAM(S): 220 (50 ZN) TAB at 12:48

## 2022-01-12 RX ADMIN — Medication 81 MILLIGRAM(S): at 12:48

## 2022-01-12 RX ADMIN — Medication 1 TABLET(S): at 12:47

## 2022-01-12 RX ADMIN — Medication 10 MILLIGRAM(S): at 21:14

## 2022-01-12 RX ADMIN — Medication 1000 UNIT(S): at 12:47

## 2022-01-13 PROCEDURE — 99232 SBSQ HOSP IP/OBS MODERATE 35: CPT

## 2022-01-13 RX ADMIN — Medication 1000 UNIT(S): at 11:42

## 2022-01-13 RX ADMIN — Medication 500 MILLIGRAM(S): at 11:41

## 2022-01-13 RX ADMIN — Medication 10 MILLIGRAM(S): at 23:20

## 2022-01-13 RX ADMIN — Medication 81 MILLIGRAM(S): at 11:41

## 2022-01-13 RX ADMIN — Medication 1 TABLET(S): at 11:41

## 2022-01-13 RX ADMIN — ENOXAPARIN SODIUM 40 MILLIGRAM(S): 100 INJECTION SUBCUTANEOUS at 11:38

## 2022-01-13 RX ADMIN — ZINC SULFATE TAB 220 MG (50 MG ZINC EQUIVALENT) 220 MILLIGRAM(S): 220 (50 ZN) TAB at 11:41

## 2022-01-14 ENCOUNTER — TRANSCRIPTION ENCOUNTER (OUTPATIENT)
Age: 72
End: 2022-01-14

## 2022-01-14 VITALS
HEART RATE: 68 BPM | DIASTOLIC BLOOD PRESSURE: 61 MMHG | RESPIRATION RATE: 18 BRPM | SYSTOLIC BLOOD PRESSURE: 120 MMHG | OXYGEN SATURATION: 96 % | TEMPERATURE: 98 F

## 2022-01-14 PROCEDURE — U0003: CPT

## 2022-01-14 PROCEDURE — G1004: CPT

## 2022-01-14 PROCEDURE — 81001 URINALYSIS AUTO W/SCOPE: CPT

## 2022-01-14 PROCEDURE — 84100 ASSAY OF PHOSPHORUS: CPT

## 2022-01-14 PROCEDURE — 85610 PROTHROMBIN TIME: CPT

## 2022-01-14 PROCEDURE — 71045 X-RAY EXAM CHEST 1 VIEW: CPT

## 2022-01-14 PROCEDURE — 97530 THERAPEUTIC ACTIVITIES: CPT

## 2022-01-14 PROCEDURE — 72170 X-RAY EXAM OF PELVIS: CPT

## 2022-01-14 PROCEDURE — 93005 ELECTROCARDIOGRAM TRACING: CPT

## 2022-01-14 PROCEDURE — 99239 HOSP IP/OBS DSCHRG MGMT >30: CPT

## 2022-01-14 PROCEDURE — 85025 COMPLETE CBC W/AUTO DIFF WBC: CPT

## 2022-01-14 PROCEDURE — 36415 COLL VENOUS BLD VENIPUNCTURE: CPT

## 2022-01-14 PROCEDURE — 84443 ASSAY THYROID STIM HORMONE: CPT

## 2022-01-14 PROCEDURE — 97110 THERAPEUTIC EXERCISES: CPT

## 2022-01-14 PROCEDURE — 87086 URINE CULTURE/COLONY COUNT: CPT

## 2022-01-14 PROCEDURE — 73030 X-RAY EXAM OF SHOULDER: CPT

## 2022-01-14 PROCEDURE — 70450 CT HEAD/BRAIN W/O DYE: CPT | Mod: MG

## 2022-01-14 PROCEDURE — 80053 COMPREHEN METABOLIC PANEL: CPT

## 2022-01-14 PROCEDURE — 83735 ASSAY OF MAGNESIUM: CPT

## 2022-01-14 PROCEDURE — 99285 EMERGENCY DEPT VISIT HI MDM: CPT | Mod: 25

## 2022-01-14 PROCEDURE — 84439 ASSAY OF FREE THYROXINE: CPT

## 2022-01-14 PROCEDURE — 87637 SARSCOV2&INF A&B&RSV AMP PRB: CPT

## 2022-01-14 PROCEDURE — 72131 CT LUMBAR SPINE W/O DYE: CPT | Mod: MG

## 2022-01-14 PROCEDURE — G0378: CPT

## 2022-01-14 PROCEDURE — 84484 ASSAY OF TROPONIN QUANT: CPT

## 2022-01-14 PROCEDURE — 86803 HEPATITIS C AB TEST: CPT

## 2022-01-14 PROCEDURE — 85730 THROMBOPLASTIN TIME PARTIAL: CPT

## 2022-01-14 PROCEDURE — 72125 CT NECK SPINE W/O DYE: CPT | Mod: MG

## 2022-01-14 PROCEDURE — U0005: CPT

## 2022-01-14 RX ADMIN — Medication 1 TABLET(S): at 14:14

## 2022-01-14 RX ADMIN — Medication 1000 UNIT(S): at 14:15

## 2022-01-14 RX ADMIN — ENOXAPARIN SODIUM 40 MILLIGRAM(S): 100 INJECTION SUBCUTANEOUS at 14:13

## 2022-01-14 RX ADMIN — ZINC SULFATE TAB 220 MG (50 MG ZINC EQUIVALENT) 220 MILLIGRAM(S): 220 (50 ZN) TAB at 14:14

## 2022-01-14 RX ADMIN — Medication 500 MILLIGRAM(S): at 14:14

## 2022-01-14 NOTE — DIETITIAN INITIAL EVALUATION ADULT. - OTHER INFO
71yM with Parkinson's disease and dementia presenting with multiple fall, also COVID+. Here for isolation before placement to [facility].

## 2022-01-14 NOTE — PROGRESS NOTE ADULT - ASSESSMENT
71yM with Parkinson's disease and dementia presenting with multiple fall, also COVID+. Here for isolation before placement to [facility].     #Weakness/Fall  - likely 2/2 COVID PNA + UTI  - s/p antibodies  - currently on RA, monitor for now  - PT evaluated -> [facility]    #COVID 19:  - trend inflammatory markers  - PRN O2  - no need for Remdesivir/decadron for now  - airborne isolation    #Thyroid nodules:  - Thyroid function WNL  - defer outpatient thyroid ultrasoun     #Parkinson disease/dementia:  - no on medications at home per family  - fall precaution    #UTI:  - s/p Cipro, completed 1/11    DVT: Lovenox QD  Diet: regular  Dispo: [facility] placement pending    
71yM with Parkinson's disease and dementia presenting with multiple fall, also COVID+. Here for isolation before placement to [facility].     #Weakness/Fall  - likely 2/2 COVID PNA + UTI  - s/p antibodies  - currently on RA, monitor for now  - PT evaluated -> [facility]    #COVID 19:  - trend inflammatory markers  - PRN O2  - no need for Remdesivir/decadron for now  - airborne isolation    #Thyroid nodules:  - Thyroid function WNL  - defer outpatient thyroid ultrasoun     #Parkinson disease/dementia:  - no on medications at home per family  - fall precaution    #UTI:  - to complete Cipro until 1/11    DVT: Lovenox QD  Diet: regular  Dispo: [facility] after isolation likely 1/12    
71yM with Parkinson's disease and dementia presenting with multiple fall, also COVID+. Here for isolation before placement to [facility].     #Weakness/Fall  - likely 2/2 COVID PNA + UTI  - s/p antibodies  - currently on RA, monitor for now  - PT evaluated -> [facility]    #COVID 19:  - trend inflammatory markers  - PRN O2  - no need for Remdesivir/decadron for now  - airborne isolation    #Thyroid nodules:  - Thyroid function WNL  - defer outpatient thyroid ultrasoun     #Parkinson disease/dementia:  - no on medications at home per family  - fall precaution    #UTI:  - to complete Cipro until 1/11    DVT: Lovenox QD  Diet: regular  Dispo: [facility] after isolation likely 1/12    
71yM with Parkinson's disease and dementia presenting with multiple fall, also COVID+. Here for isolation before placement to [facility].     #Weakness/Fall  - likely 2/2 COVID PNA + UTI  - s/p antibodies  - currently on RA, monitor for now  - PT evaluated -> [facility]    #COVID 19:  - trend inflammatory markers  - PRN O2  - no need for Remdesivir/decadron for now  - airborne isolation    #Thyroid nodules:  - Thyroid function WNL  - defer outpatient thyroid ultrasoun     #Parkinson disease/dementia:  - no on medications at home per family  - fall precaution    #UTI:  - s/p Cipro, completed 1/11    DVT: Lovenox QD  Diet: regular  Dispo: [facility] pending     
71yM with Parkinson's disease and dementia presenting with multiple fall, also COVID+. Here for isolation before placement to [facility].     #Weakness/Fall  - likely 2/2 COVID PNA + UTI  - s/p antibodies  - currently on RA, monitor for now  - PT evaluated -> [facility]    #COVID 19:  - trend inflammatory markers  - PRN O2  - no need for Remdesivir/decadron for now  - airborne isolation    #Thyroid nodules:  - Thyroid function WNL  - defer outpatient thyroid ultrasoun     #Parkinson disease/dementia:  - no on medications at home per family  - fall precaution    #UTI:  - s/p Cipro, completed 1/11    DVT: Lovenox QD  Diet: regular  Dispo: [facility] after isolation, pending cm for arrangement

## 2022-01-14 NOTE — PROGRESS NOTE ADULT - SUBJECTIVE AND OBJECTIVE BOX
[facility] Division of Hospital Medicine Progress Note    CONTACT INFO:  Blaze Spencer M.D.  [phone]    Patient is a 71y old  Male who presents with a chief complaint of COVID, fall (13 Jan 2022 10:21)      SUBJECTIVE / OVERNIGHT EVENTS: no event overnight    Patient denies chest pain, SOB, abd pain, N/V, fever, chills, dysuria or any other complaints. All remainder ROS negative.     MEDICATIONS  (STANDING):  ascorbic acid 500 milliGRAM(s) Oral daily  aspirin  chewable 81 milliGRAM(s) Oral daily  cholecalciferol 1000 Unit(s) Oral daily  doxepin 10 milliGRAM(s) Oral at bedtime  enoxaparin Injectable 40 milliGRAM(s) SubCutaneous daily  multivitamin 1 Tablet(s) Oral daily  zinc sulfate 220 milliGRAM(s) Oral daily    MEDICATIONS  (PRN):  acetaminophen     Tablet .. 650 milliGRAM(s) Oral every 6 hours PRN Temp greater or equal to 38C (100.4F), Moderate Pain (4 - 6)      I&O's Summary    13 Jan 2022 07:01  -  14 Jan 2022 07:00  --------------------------------------------------------  IN: 0 mL / OUT: 800 mL / NET: -800 mL        PHYSICAL EXAM:  Vital Signs Last 24 Hrs  T(C): 37.1 (14 Jan 2022 09:48), Max: 37.1 (14 Jan 2022 09:48)  T(F): 98.8 (14 Jan 2022 09:48), Max: 98.8 (14 Jan 2022 09:48)  HR: 61 (14 Jan 2022 09:48) (61 - 73)  BP: 104/65 (14 Jan 2022 09:48) (102/66 - 114/72)  BP(mean): --  RR: 18 (14 Jan 2022 09:48) (18 - 18)  SpO2: 93% (14 Jan 2022 09:48) (92% - 97%)    GENERAL: NAD, lying in bed comfortably  HEAD:  Atraumatic, Normocephalic  EYES: pupil equal/reactive, conjunctiva and sclera clear  ENT: Moist mucous membranes  NECK: Supple, No JVD  CHEST/LUNG: Clear to auscultation bilaterally; No rales, rhonchi, wheezing, or rubs. Unlabored respirations  HEART: Regular rate and rhythm; No murmurs, rubs, or gallops  ABDOMEN: BSx4; Soft, nontender, nondistended  EXTREMITIES:  2+ Peripheral Pulses, brisk capillary refill. No clubbing, cyanosis, or edema  NERVOUS SYSTEM:  A&Ox3, no focal deficits   SKIN: No rashes or lesions  PSYCH: Normal affect, euthymic mood. Baseline speech garble    LABS:                    CAPILLARY BLOOD GLUCOSE          RADIOLOGY & ADDITIONAL TESTS:  Results Reviewed:   Imaging Personally Reviewed:  Electrocardiogram Personally Reviewed:
[facility] Division of Hospital Medicine Progress Note    CONTACT INFO:  Blaze Spencer M.D.  [phone]    Patient is a 71y old  Male who presents with a chief complaint of COVID, fall (2022 14:01)      SUBJECTIVE / OVERNIGHT EVENTS: no concerns.     Patient denies chest pain, SOB, abd pain, N/V, fever, chills, dysuria or any other complaints. All remainder ROS negative.     MEDICATIONS  (STANDING):  ascorbic acid 500 milliGRAM(s) Oral daily  aspirin  chewable 81 milliGRAM(s) Oral daily  cholecalciferol 1000 Unit(s) Oral daily  ciprofloxacin   IVPB 200 milliGRAM(s) IV Intermittent every 12 hours  doxepin 10 milliGRAM(s) Oral at bedtime  enoxaparin Injectable 40 milliGRAM(s) SubCutaneous daily  multivitamin 1 Tablet(s) Oral daily  zinc sulfate 220 milliGRAM(s) Oral daily    MEDICATIONS  (PRN):  acetaminophen     Tablet .. 650 milliGRAM(s) Oral every 6 hours PRN Temp greater or equal to 38C (100.4F), Moderate Pain (4 - 6)      I&O's Summary    2022 07:01  -  10 Deo 2022 07:00  --------------------------------------------------------  IN: 0 mL / OUT: 250 mL / NET: -250 mL        PHYSICAL EXAM:  Vital Signs Last 24 Hrs  T(C): 36.4 (10 Deo 2022 11:40), Max: 37.3 (2022 22:47)  T(F): 97.6 (10 Deo 2022 11:40), Max: 99.2 (2022 22:47)  HR: 88 (10 Deo 2022 11:40) (70 - 88)  BP: 166/76 (10 Deo 2022 11:40) (135/66 - 192/90)  BP(mean): --  RR: 18 (10 Deo 2022 11:40) (18 - 21)  SpO2: 94% (10 Deo 2022 11:40) (92% - 96%)    GENERAL: NAD, lying in bed comfortably  HEAD:  Atraumatic, Normocephalic  EYES: pupil equal/reactive, conjunctiva and sclera clear  ENT: Moist mucous membranes  NECK: Supple, No JVD  CHEST/LUNG: Clear to auscultation bilaterally; No rales, rhonchi, wheezing, or rubs. Unlabored respirations  HEART: Regular rate and rhythm; No murmurs, rubs, or gallops  ABDOMEN: BSx4; Soft, nontender, nondistended  EXTREMITIES:  2+ Peripheral Pulses, brisk capillary refill. No clubbing, cyanosis, or edema  NERVOUS SYSTEM:  A&Ox3, no focal deficits   SKIN: No rashes or lesions  PSYCH: Normal affect, euthymic mood. Baseline speech garble    LABS:                        13.2   7.58  )-----------( 213      ( 10 Deo 2022 06:34 )             41.7     01-10    140  |  103  |  16.6  ----------------------------<  113<H>  4.2   |  27.0  |  0.95    Ca    9.0      10 Deo 2022 06:34  Phos  2.8     01-10  Mg     2.0     01-10    TPro  7.5  /  Alb  3.8  /  TBili  0.8  /  DBili  x   /  AST  35  /  ALT  26  /  AlkPhos  53  01-10    PT/INR - ( 2022 17:09 )   PT: 13.2 sec;   INR: 1.15 ratio         PTT - ( 2022 17:09 )  PTT:24.4 sec  CARDIAC MARKERS ( 2022 02:00 )  x     / <0.01 ng/mL / x     / x     / x      CARDIAC MARKERS ( 2022 18:24 )  x     / <0.01 ng/mL / x     / x     / x          Urinalysis Basic - ( 2022 22:30 )    Color: Yellow / Appearance: Clear / S.020 / pH: x  Gluc: x / Ketone: Small  / Bili: Negative / Urobili: 1 mg/dL   Blood: x / Protein: 15 / Nitrite: Negative   Leuk Esterase: Negative / RBC: x / WBC x   Sq Epi: x / Non Sq Epi: Occasional / Bacteria: x        Culture - Urine (collected 2022 06:45)  Source: Catheterized Catheterized  Final Report (10 Deo 2022 07:11):    No growth      CAPILLARY BLOOD GLUCOSE          RADIOLOGY & ADDITIONAL TESTS:  Results Reviewed:   Imaging Personally Reviewed:  Electrocardiogram Personally Reviewed:
[facility] Division of Hospital Medicine Progress Note    CONTACT INFO:  Blaze Spencer M.D.  [phone]    Patient is a 71y old  Male who presents with a chief complaint of COVID, fall (11 Jan 2022 16:26)      SUBJECTIVE / OVERNIGHT EVENTS: no concerns overnight    Patient denies chest pain, SOB, abd pain, N/V, fever, chills, dysuria or any other complaints. All remainder ROS negative.     MEDICATIONS  (STANDING):  ascorbic acid 500 milliGRAM(s) Oral daily  aspirin  chewable 81 milliGRAM(s) Oral daily  cholecalciferol 1000 Unit(s) Oral daily  doxepin 10 milliGRAM(s) Oral at bedtime  enoxaparin Injectable 40 milliGRAM(s) SubCutaneous daily  multivitamin 1 Tablet(s) Oral daily  zinc sulfate 220 milliGRAM(s) Oral daily    MEDICATIONS  (PRN):  acetaminophen     Tablet .. 650 milliGRAM(s) Oral every 6 hours PRN Temp greater or equal to 38C (100.4F), Moderate Pain (4 - 6)      I&O's Summary      PHYSICAL EXAM:  Vital Signs Last 24 Hrs  T(C): 36.6 (12 Jan 2022 04:39), Max: 37.1 (11 Jan 2022 16:20)  T(F): 97.8 (12 Jan 2022 04:39), Max: 98.7 (11 Jan 2022 16:20)  HR: 55 (12 Jan 2022 04:39) (55 - 75)  BP: 160/107 (12 Jan 2022 04:39) (101/60 - 160/107)  BP(mean): --  RR: 16 (12 Jan 2022 04:39) (16 - 18)  SpO2: 93% (12 Jan 2022 04:39) (93% - 93%)    GENERAL: NAD, lying in bed comfortably  HEAD:  Atraumatic, Normocephalic  EYES: pupil equal/reactive, conjunctiva and sclera clear  ENT: Moist mucous membranes  NECK: Supple, No JVD  CHEST/LUNG: Clear to auscultation bilaterally; No rales, rhonchi, wheezing, or rubs. Unlabored respirations  HEART: Regular rate and rhythm; No murmurs, rubs, or gallops  ABDOMEN: BSx4; Soft, nontender, nondistended  EXTREMITIES:  2+ Peripheral Pulses, brisk capillary refill. No clubbing, cyanosis, or edema  NERVOUS SYSTEM:  A&Ox3, no focal deficits   SKIN: No rashes or lesions  PSYCH: Normal affect, euthymic mood. Baseline speech garble    LABS:                    CAPILLARY BLOOD GLUCOSE          RADIOLOGY & ADDITIONAL TESTS:  Results Reviewed:   Imaging Personally Reviewed:  Electrocardiogram Personally Reviewed:
[facility] Division of Hospital Medicine Progress Note    CONTACT INFO:  Blaze Spencer M.D.  [phone]    Patient is a 71y old  Male who presents with a chief complaint of COVID, fall (12 Jan 2022 11:17)      SUBJECTIVE / OVERNIGHT EVENTS: no event overnight.     Patient denies chest pain, SOB, abd pain, N/V, fever, chills, dysuria or any other complaints. All remainder ROS negative.     MEDICATIONS  (STANDING):  ascorbic acid 500 milliGRAM(s) Oral daily  aspirin  chewable 81 milliGRAM(s) Oral daily  cholecalciferol 1000 Unit(s) Oral daily  doxepin 10 milliGRAM(s) Oral at bedtime  enoxaparin Injectable 40 milliGRAM(s) SubCutaneous daily  multivitamin 1 Tablet(s) Oral daily  zinc sulfate 220 milliGRAM(s) Oral daily    MEDICATIONS  (PRN):  acetaminophen     Tablet .. 650 milliGRAM(s) Oral every 6 hours PRN Temp greater or equal to 38C (100.4F), Moderate Pain (4 - 6)      I&O's Summary    12 Jan 2022 07:01  -  13 Jan 2022 07:00  --------------------------------------------------------  IN: 0 mL / OUT: 250 mL / NET: -250 mL        PHYSICAL EXAM:  Vital Signs Last 24 Hrs  T(C): 36.6 (13 Jan 2022 09:52), Max: 36.9 (12 Jan 2022 11:14)  T(F): 97.8 (13 Jan 2022 09:52), Max: 98.5 (12 Jan 2022 11:14)  HR: 90 (13 Jan 2022 09:52) (63 - 90)  BP: 126/75 (13 Jan 2022 09:52) (107/66 - 150/85)  BP(mean): --  RR: 18 (13 Jan 2022 09:52) (18 - 19)  SpO2: 93% (13 Jan 2022 09:52) (92% - 95%)    GENERAL: NAD, lying in bed comfortably  HEAD:  Atraumatic, Normocephalic  EYES: pupil equal/reactive, conjunctiva and sclera clear  ENT: Moist mucous membranes  NECK: Supple, No JVD  CHEST/LUNG: Clear to auscultation bilaterally; No rales, rhonchi, wheezing, or rubs. Unlabored respirations  HEART: Regular rate and rhythm; No murmurs, rubs, or gallops  ABDOMEN: BSx4; Soft, nontender, nondistended  EXTREMITIES:  2+ Peripheral Pulses, brisk capillary refill. No clubbing, cyanosis, or edema  NERVOUS SYSTEM:  A&Ox3, no focal deficits   SKIN: No rashes or lesions  PSYCH: Normal affect, euthymic mood. Baseline speech garble      LABS:    CAPILLARY BLOOD GLUCOSE      RADIOLOGY & ADDITIONAL TESTS:  Results Reviewed:   Imaging Personally Reviewed:  Electrocardiogram Personally Reviewed:
[facility] Division of Hospital Medicine Progress Note    CONTACT INFO:  Blaze Spencer M.D.  [phone]    Patient is a 71y old  Male who presents with a chief complaint of COVID, fall (10 Deo 2022 11:58)      SUBJECTIVE / OVERNIGHT EVENTS:    Patient denies chest pain, SOB, abd pain, N/V, fever, chills, dysuria or any other complaints. All remainder ROS negative.     MEDICATIONS  (STANDING):  ascorbic acid 500 milliGRAM(s) Oral daily  aspirin  chewable 81 milliGRAM(s) Oral daily  cholecalciferol 1000 Unit(s) Oral daily  ciprofloxacin   IVPB 200 milliGRAM(s) IV Intermittent every 12 hours  doxepin 10 milliGRAM(s) Oral at bedtime  enoxaparin Injectable 40 milliGRAM(s) SubCutaneous daily  multivitamin 1 Tablet(s) Oral daily  zinc sulfate 220 milliGRAM(s) Oral daily    MEDICATIONS  (PRN):  acetaminophen     Tablet .. 650 milliGRAM(s) Oral every 6 hours PRN Temp greater or equal to 38C (100.4F), Moderate Pain (4 - 6)      I&O's Summary      PHYSICAL EXAM:  Vital Signs Last 24 Hrs  T(C): 36.8 (11 Jan 2022 04:59), Max: 37.8 (10 Deo 2022 16:36)  T(F): 98.3 (11 Jan 2022 04:59), Max: 100 (10 Deo 2022 16:36)  HR: 62 (11 Jan 2022 04:59) (62 - 88)  BP: 148/83 (11 Jan 2022 04:59) (133/68 - 166/76)  BP(mean): --  RR: 18 (11 Jan 2022 04:59) (18 - 18)  SpO2: 97% (11 Jan 2022 04:59) (94% - 97%)    GENERAL: NAD, lying in bed comfortably  HEAD:  Atraumatic, Normocephalic  EYES: pupil equal/reactive, conjunctiva and sclera clear  ENT: Moist mucous membranes  NECK: Supple, No JVD  CHEST/LUNG: Clear to auscultation bilaterally; No rales, rhonchi, wheezing, or rubs. Unlabored respirations  HEART: Regular rate and rhythm; No murmurs, rubs, or gallops  ABDOMEN: BSx4; Soft, nontender, nondistended  EXTREMITIES:  2+ Peripheral Pulses, brisk capillary refill. No clubbing, cyanosis, or edema  NERVOUS SYSTEM:  A&Ox3, no focal deficits   SKIN: No rashes or lesions  PSYCH: Normal affect, euthymic mood. Baseline speech garble  LABS:                        13.2   7.58  )-----------( 213      ( 10 Deo 2022 06:34 )             41.7     01-10    140  |  103  |  16.6  ----------------------------<  113<H>  4.2   |  27.0  |  0.95    Ca    9.0      10 Deo 2022 06:34  Phos  2.8     01-10  Mg     2.0     01-10    TPro  7.5  /  Alb  3.8  /  TBili  0.8  /  DBili  x   /  AST  35  /  ALT  26  /  AlkPhos  53  01-10              Culture - Urine (collected 09 Jan 2022 06:45)  Source: Catheterized Catheterized  Final Report (10 Deo 2022 07:11):    No growth      CAPILLARY BLOOD GLUCOSE          RADIOLOGY & ADDITIONAL TESTS:  Results Reviewed:   Imaging Personally Reviewed:  Electrocardiogram Personally Reviewed:

## 2022-01-14 NOTE — DISCHARGE NOTE NURSING/CASE MANAGEMENT/SOCIAL WORK - NSDCPEFALRISK_GEN_ALL_CORE
For information on Fall & Injury Prevention, visit: [email or web address] OR  [email or web address] OR  https://www.cdc.gov/steadi/patient.html

## 2022-01-27 NOTE — ED ADULT TRIAGE NOTE - NS ED NURSE DIRECT TO ROOM YN
Related to acute blood loss post-procedure  Hgb currently 10 1  Hgb was 15 0 pre-operatively  Iron panel on 1/20: Iron Sat 22%, TIBC 147, Iron 32, and Ferritin 304  Started on ferrous gluconate and ascorbic acid  Monitor for s/s of active bleeding  Continue to trend with routine CBC  No

## 2022-11-08 ENCOUNTER — APPOINTMENT (OUTPATIENT)
Dept: CARDIOLOGY | Facility: CLINIC | Age: 72
End: 2022-11-08

## 2022-11-08 ENCOUNTER — NON-APPOINTMENT (OUTPATIENT)
Age: 72
End: 2022-11-08

## 2022-11-08 VITALS
BODY MASS INDEX: 32.29 KG/M2 | HEART RATE: 73 BPM | HEIGHT: 69 IN | SYSTOLIC BLOOD PRESSURE: 137 MMHG | DIASTOLIC BLOOD PRESSURE: 70 MMHG | OXYGEN SATURATION: 95 % | WEIGHT: 218 LBS | TEMPERATURE: 99 F

## 2022-11-08 DIAGNOSIS — R60.0 LOCALIZED EDEMA: ICD-10-CM

## 2022-11-08 DIAGNOSIS — E55.9 VITAMIN D DEFICIENCY, UNSPECIFIED: ICD-10-CM

## 2022-11-08 PROCEDURE — 99215 OFFICE O/P EST HI 40 MIN: CPT | Mod: 25

## 2022-11-08 PROCEDURE — 93000 ELECTROCARDIOGRAM COMPLETE: CPT

## 2022-11-08 NOTE — DISCUSSION/SUMMARY
[Patient] : the patient [Risks] : risks [Benefits] : benefits [Alternatives] : alternatives [With Me] : with me [___ Year(s)] : in [unfilled] year(s) [EKG obtained to assist in diagnosis and management of assessed problem(s)] : EKG obtained to assist in diagnosis and management of assessed problem(s) [FreeTextEntry1] : This is a  72year old male with Parkinsons, had  bacteremia with tricuspid valve density.\par \par 1) Tricuspid valve density/disorder: No obvious vegetations.  \par 2) cardiomyopathy : LVEF 49% conservative management : patient and family defers any stress test . aspirin and defers statins   patients faily defers aggressive testingh. will see if can get edcho tdone todaty. \par 3)  LE edema: venous insufficiency.  resolved. reflux study. ace  wrap. elevate legs. \par conservative management. \par 4) significant partkinsons:  f/u with neurologuy \par  \par Will order and review ECG for the above mentioned diagnosis/condition/symptoms \par

## 2022-11-08 NOTE — CARDIOLOGY SUMMARY
[___] : [unfilled] [de-identified] : 11 8 2022 Sinus  Rhythm  - occasional ectopic ventricular beat   \par Low voltage in precordial leads. \par \par ABNORMAL \par \par \par 11 9 2021  Sinus  Rhythm  -Frequent pvcs -ventricular trigeminy \par -Left atrial enlargement. \par  Non specific ST t changes. \par \par ABNORMAL RHYTHM [de-identified] : 3 mar 2021:  LVEF 49%. grade   diastolic dysfunction . Normal Rv.  no significant valvular abnormality mild PAHz  [de-identified] : caroitid dUplex:  minimap plaque. no stenosis

## 2022-11-08 NOTE — HISTORY OF PRESENT ILLNESS
[FreeTextEntry1] : tricuspid valve vegetation\par \par \par HPI for today:   he has parkinsons and he has unsteady .  he tries to be independent. he is dependant.  and he tries to get up by himself and he falls. \par he has not syncope. no dizziness. \par no dyspnea on exertion \par he is seeing aneurologist\par \par \par old note:   feels good. no cehst pain. no dyspnea on exertion . no LE edema.   no syncope. no palptiaitons.\par no ankle swelling\par \par \par old note: feels good. no ches tpain. no headaches. no passing. no dizziness,. no weakness.  generalized treamors.\par not on any parkinson meds.\par \par \par \par old note: yovani was seen in hospital. patient had infection of the blood. was on abx. 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 However, we could not rule out vegetations. He was asked to follow up after abx to repeat the TTE and possible FAYE to see any progression of the density and to check if we need to restart abx and treat him as endocarditis. \par \par patient stopped his Abx on thursday. \par

## 2022-11-08 NOTE — PHYSICAL EXAM
[General Appearance - Well Developed] : well developed [Normal Appearance] : normal appearance [Well Groomed] : well groomed [General Appearance - Well Nourished] : well nourished [No Deformities] : no deformities [General Appearance - In No Acute Distress] : no acute distress [Normal Conjunctiva] : the conjunctiva exhibited no abnormalities [Eyelids - No Xanthelasma] : the eyelids demonstrated no xanthelasmas [Normal Oral Mucosa] : normal oral mucosa [No Oral Pallor] : no oral pallor [No Oral Cyanosis] : no oral cyanosis [Normal Jugular Venous A Waves Present] : normal jugular venous A waves present [Normal Jugular Venous V Waves Present] : normal jugular venous V waves present [No Jugular Venous Hernandez A Waves] : no jugular venous hernandez A waves [Respiration, Rhythm And Depth] : normal respiratory rhythm and effort [Exaggerated Use Of Accessory Muscles For Inspiration] : no accessory muscle use [Auscultation Breath Sounds / Voice Sounds] : lungs were clear to auscultation bilaterally [Heart Rate And Rhythm] : heart rate and rhythm were normal [Heart Sounds] : normal S1 and S2 [Abdomen Soft] : soft [Abdomen Tenderness] : non-tender [Abdomen Mass (___ Cm)] : no abdominal mass palpated [Abnormal Walk] : normal gait [Gait - Sufficient For Exercise Testing] : the gait was sufficient for exercise testing [Nail Clubbing] : no clubbing of the fingernails [Cyanosis, Localized] : no localized cyanosis [Petechial Hemorrhages (___cm)] : no petechial hemorrhages [] : no ischemic changes [No Skin Ulcers] : no skin ulcer [No Xanthoma] : no  xanthoma was observed [Oriented To Time, Place, And Person] : oriented to person, place, and time [Affect] : the affect was normal [Mood] : the mood was normal [No Anxiety] : not feeling anxious [FreeTextEntry1] : parkinsons

## 2022-11-08 NOTE — REASON FOR VISIT
[Follow-Up - From Hospitalization] : follow-up of a recent hospitalization for [FreeTextEntry1] : tricuspid valve vegetation [FreeTextEntry2] : tricuspid valve vegetation

## 2022-11-10 ENCOUNTER — NON-APPOINTMENT (OUTPATIENT)
Age: 72
End: 2022-11-10

## 2022-12-02 ENCOUNTER — EMERGENCY (EMERGENCY)
Facility: HOSPITAL | Age: 72
LOS: 1 days | Discharge: HOME | End: 2022-12-02
Attending: STUDENT IN AN ORGANIZED HEALTH CARE EDUCATION/TRAINING PROGRAM
Payer: COMMERCIAL

## 2022-12-02 VITALS
OXYGEN SATURATION: 96 % | RESPIRATION RATE: 18 BRPM | HEART RATE: 73 BPM | TEMPERATURE: 99 F | DIASTOLIC BLOOD PRESSURE: 104 MMHG | SYSTOLIC BLOOD PRESSURE: 187 MMHG

## 2022-12-02 VITALS
TEMPERATURE: 99 F | HEIGHT: 69 IN | SYSTOLIC BLOOD PRESSURE: 117 MMHG | WEIGHT: 218.04 LBS | HEART RATE: 82 BPM | DIASTOLIC BLOOD PRESSURE: 68 MMHG | OXYGEN SATURATION: 98 % | RESPIRATION RATE: 18 BRPM

## 2022-12-02 DIAGNOSIS — Z98.890 OTHER SPECIFIED POSTPROCEDURAL STATES: Chronic | ICD-10-CM

## 2022-12-02 LAB
ALBUMIN SERPL ELPH-MCNC: 3.8 G/DL — SIGNIFICANT CHANGE UP (ref 3.3–5.2)
ALP SERPL-CCNC: 77 U/L — SIGNIFICANT CHANGE UP (ref 40–120)
ALT FLD-CCNC: 17 U/L — SIGNIFICANT CHANGE UP
ANION GAP SERPL CALC-SCNC: 10 MMOL/L — SIGNIFICANT CHANGE UP (ref 5–17)
APPEARANCE UR: CLEAR — SIGNIFICANT CHANGE UP
AST SERPL-CCNC: 17 U/L — SIGNIFICANT CHANGE UP
BASOPHILS # BLD AUTO: 0.03 K/UL — SIGNIFICANT CHANGE UP (ref 0–0.2)
BASOPHILS NFR BLD AUTO: 0.5 % — SIGNIFICANT CHANGE UP (ref 0–2)
BILIRUB SERPL-MCNC: 0.5 MG/DL — SIGNIFICANT CHANGE UP (ref 0.4–2)
BILIRUB UR-MCNC: NEGATIVE — SIGNIFICANT CHANGE UP
BUN SERPL-MCNC: 13.8 MG/DL — SIGNIFICANT CHANGE UP (ref 8–20)
CALCIUM SERPL-MCNC: 8.9 MG/DL — SIGNIFICANT CHANGE UP (ref 8.4–10.5)
CHLORIDE SERPL-SCNC: 103 MMOL/L — SIGNIFICANT CHANGE UP (ref 96–108)
CO2 SERPL-SCNC: 25 MMOL/L — SIGNIFICANT CHANGE UP (ref 22–29)
COLOR SPEC: YELLOW — SIGNIFICANT CHANGE UP
CREAT SERPL-MCNC: 0.98 MG/DL — SIGNIFICANT CHANGE UP (ref 0.5–1.3)
DIFF PNL FLD: NEGATIVE — SIGNIFICANT CHANGE UP
EGFR: 81 ML/MIN/1.73M2 — SIGNIFICANT CHANGE UP
EOSINOPHIL # BLD AUTO: 0.1 K/UL — SIGNIFICANT CHANGE UP (ref 0–0.5)
EOSINOPHIL NFR BLD AUTO: 1.6 % — SIGNIFICANT CHANGE UP (ref 0–6)
FLUAV AG NPH QL: SIGNIFICANT CHANGE UP
FLUBV AG NPH QL: SIGNIFICANT CHANGE UP
GLUCOSE SERPL-MCNC: 104 MG/DL — HIGH (ref 70–99)
GLUCOSE UR QL: NEGATIVE MG/DL — SIGNIFICANT CHANGE UP
HCT VFR BLD CALC: 38 % — LOW (ref 39–50)
HGB BLD-MCNC: 12.2 G/DL — LOW (ref 13–17)
IMM GRANULOCYTES NFR BLD AUTO: 0.3 % — SIGNIFICANT CHANGE UP (ref 0–0.9)
KETONES UR-MCNC: NEGATIVE — SIGNIFICANT CHANGE UP
LEUKOCYTE ESTERASE UR-ACNC: NEGATIVE — SIGNIFICANT CHANGE UP
LYMPHOCYTES # BLD AUTO: 0.52 K/UL — LOW (ref 1–3.3)
LYMPHOCYTES # BLD AUTO: 8.5 % — LOW (ref 13–44)
MAGNESIUM SERPL-MCNC: 1.8 MG/DL — SIGNIFICANT CHANGE UP (ref 1.8–2.6)
MCHC RBC-ENTMCNC: 28.1 PG — SIGNIFICANT CHANGE UP (ref 27–34)
MCHC RBC-ENTMCNC: 32.1 GM/DL — SIGNIFICANT CHANGE UP (ref 32–36)
MCV RBC AUTO: 87.6 FL — SIGNIFICANT CHANGE UP (ref 80–100)
MONOCYTES # BLD AUTO: 0.47 K/UL — SIGNIFICANT CHANGE UP (ref 0–0.9)
MONOCYTES NFR BLD AUTO: 7.7 % — SIGNIFICANT CHANGE UP (ref 2–14)
NEUTROPHILS # BLD AUTO: 4.98 K/UL — SIGNIFICANT CHANGE UP (ref 1.8–7.4)
NEUTROPHILS NFR BLD AUTO: 81.4 % — HIGH (ref 43–77)
NITRITE UR-MCNC: NEGATIVE — SIGNIFICANT CHANGE UP
PH UR: 6 — SIGNIFICANT CHANGE UP (ref 5–8)
PLATELET # BLD AUTO: 152 K/UL — SIGNIFICANT CHANGE UP (ref 150–400)
POTASSIUM SERPL-MCNC: 4.3 MMOL/L — SIGNIFICANT CHANGE UP (ref 3.5–5.3)
POTASSIUM SERPL-SCNC: 4.3 MMOL/L — SIGNIFICANT CHANGE UP (ref 3.5–5.3)
PROT SERPL-MCNC: 6.8 G/DL — SIGNIFICANT CHANGE UP (ref 6.6–8.7)
PROT UR-MCNC: NEGATIVE — SIGNIFICANT CHANGE UP
RBC # BLD: 4.34 M/UL — SIGNIFICANT CHANGE UP (ref 4.2–5.8)
RBC # FLD: 13.2 % — SIGNIFICANT CHANGE UP (ref 10.3–14.5)
RSV RNA NPH QL NAA+NON-PROBE: SIGNIFICANT CHANGE UP
SARS-COV-2 RNA SPEC QL NAA+PROBE: DETECTED
SODIUM SERPL-SCNC: 138 MMOL/L — SIGNIFICANT CHANGE UP (ref 135–145)
SP GR SPEC: 1.01 — SIGNIFICANT CHANGE UP (ref 1.01–1.02)
UROBILINOGEN FLD QL: NEGATIVE MG/DL — SIGNIFICANT CHANGE UP
WBC # BLD: 6.12 K/UL — SIGNIFICANT CHANGE UP (ref 3.8–10.5)
WBC # FLD AUTO: 6.12 K/UL — SIGNIFICANT CHANGE UP (ref 3.8–10.5)

## 2022-12-02 PROCEDURE — 81003 URINALYSIS AUTO W/O SCOPE: CPT

## 2022-12-02 PROCEDURE — 85025 COMPLETE CBC W/AUTO DIFF WBC: CPT

## 2022-12-02 PROCEDURE — 71045 X-RAY EXAM CHEST 1 VIEW: CPT

## 2022-12-02 PROCEDURE — 80053 COMPREHEN METABOLIC PANEL: CPT

## 2022-12-02 PROCEDURE — 87086 URINE CULTURE/COLONY COUNT: CPT

## 2022-12-02 PROCEDURE — 87637 SARSCOV2&INF A&B&RSV AMP PRB: CPT

## 2022-12-02 PROCEDURE — 71045 X-RAY EXAM CHEST 1 VIEW: CPT | Mod: 26

## 2022-12-02 PROCEDURE — 99284 EMERGENCY DEPT VISIT MOD MDM: CPT | Mod: 25

## 2022-12-02 PROCEDURE — 99284 EMERGENCY DEPT VISIT MOD MDM: CPT

## 2022-12-02 PROCEDURE — 36415 COLL VENOUS BLD VENIPUNCTURE: CPT

## 2022-12-02 PROCEDURE — 83735 ASSAY OF MAGNESIUM: CPT

## 2022-12-02 RX ORDER — SODIUM CHLORIDE 9 MG/ML
1000 INJECTION INTRAMUSCULAR; INTRAVENOUS; SUBCUTANEOUS ONCE
Refills: 0 | Status: DISCONTINUED | OUTPATIENT
Start: 2022-12-02 | End: 2022-12-10

## 2022-12-02 NOTE — ED PROVIDER NOTE - ATTENDING CONTRIBUTION TO CARE
LAURA Landaverde:  73 yo male with PMHx parkinson's dementia, presenting from home with fever of 100.4 and cough. Aaox2, at baseline mental status, vitals stable, lungs clear, no peripheral edema. Will hydrate, check labs, cxr, follow up studies, reassess, dispo.     I have personally performed a face to face diagnostic evaluation on this patient.  I have reviewed the resident's note and agree with the history, exam, and plan of care, except as noted.   My medical decision making and observations are found above.

## 2022-12-02 NOTE — ED PROVIDER NOTE - NSFOLLOWUPINSTRUCTIONS_ED_ALL_ED_FT
COVID-19      COVID-19, or coronavirus disease 2019, is a systemic infection that is caused by a novel coronavirus called SARS-CoV-2. In some people, the virus may not cause any symptoms. In others, it may cause mild or severe symptoms. Some people with severe infection develop severe disease, which may lead to acute respiratory distress syndrome and shock.      What are the causes?  The human body, showing how the coronavirus travels from the air to a person's lungs.   This illness is caused by a virus. The virus may be in the air or on surfaces as droplets or aerosols of various sizes. You may catch the virus by:  •Breathing in droplets from an infected person. Droplets can be spread by a person breathing, speaking, singing, coughing, or sneezing.      •Touching something, like a table or a doorknob, that was exposed to the virus (is contaminated) and then touching your mouth, nose, or eyes.        What increases the risk?    Risk for infection:     You are more likely to become infected with the COVID-19 virus if:  •You are within 6 ft (1.8 m) of a person with COVID-19 for 15 minutes or longer.      •You are providing care for a person who is infected with COVID-19.      •You are in close personal contact with other people. Close personal contact includes hugging, kissing, or sharing eating or drinking utensils.      Risk for serious illness due to COVID-19:    You are more likely to become seriously ill from the COVID-19 virus if:  •You have cancer.    •You have a long-term (chronic) disease, such as:  •Chronic lung disease, including pulmonary embolism, chronic obstructive pulmonary disease, and cystic fibrosis.      •Long-term disease that lowers your body's ability to fight infection (immunocompromise).      •Serious cardiac conditions, such as heart failure, coronary artery disease, or cardiomyopathy.      •Diabetes.      •Chronic kidney disease.      •Liver diseases, including cirrhosis, nonalcoholic fatty liver disease, alcoholic liver disease, or autoimmune hepatitis.        •You are obese.      •You are pregnant or recently pregnant.      •You have sickle cell disease.        What are the signs or symptoms?    Symptoms of this condition can range from mild to severe. Symptoms may appear any time from 2 to 14 days after being exposed to the virus. They include:  •Fever or chills.      •Difficulty breathing or having shortness of breath.      •Feeling tired or very tired.      •Headaches, body aches, or muscle aches.      •Runny or stuffy nose, sneezing, cough, sore throat.      •New loss of taste or smell. This is rare.      Some people may also have stomach problems, such as nausea, vomiting, or diarrhea.    Other people may not have any symptoms of COVID-19.      How is this diagnosed?  A sample being collected by swabbing the nose.   This condition may be diagnosed by testing samples to check for the COVID-19 virus. The most common tests are the PCR test and the antigen test. Tests may be done in the lab or at home. They include:  •Using a swab to take a sample of fluid from the back of your nose and throat (nasopharyngeal fluid), from your nose, or from your throat.      •Testing a sample of saliva from your mouth.      •Testing a sample of coughed-up mucus from your lungs (sputum).        How is this treated?    Treatment for COVID-19 infection depends on the severity of the condition.  •Mild symptoms can be managed at home with rest, fluids, and over-the-counter medicines.    •Serious symptoms may be treated in a hospital intensive care unit, or ICU. Treatment in the ICU may include:  •Supplemental oxygen. Extra oxygen is given through a tube in the nose, a face mask, or a encarnacion.    •Medicines. You may be given medicines:  •To help your body fight off certain viruses that can cause disease (antivirals).      •To reduce inflammation and suppress the immune system (corticosteroids).      •To prevent or treat blood clots, if they develop (antithrombotics).        •Antibodies made in a lab that can restore or strengthen your body's natural immune system (monoclonal antibody).      •Positioning you to lie on your stomach (prone position). This makes it easier for oxygen to get into the lungs.      •Infection control measures.        If you are at risk for more serious illness due to COVID-19, your health care provider may prescribe a combination of two long-acting monoclonal antibodies, administered together every 6 months.      How is this prevented?    To protect yourself:   •Get vaccinated. COVID-19 vaccines are available for everyone aged 6 months and older.  •Vaccination is recommended for women who are pregnant, may become pregnant, or are breastfeeding.      •Patients who require major surgery should plan their vaccination for several days before or after the surgery.      •Talk to your health care provider about receiving experimental monoclonal antibodies. This treatment has been approved under emergency use authorization to prevent severe illness before or after you are exposed to the COVID-19 virus. You may be given monoclonal antibodies if:  •You are moderately or severely immunocompromised. This includes treatments that lower your immune response. People with immunocompromise may not develop protection against COVID-19 when they are vaccinated.      •You cannot be vaccinated. You may not receive a vaccine if you have a severe allergic reaction to the vaccine or its components.      •You are not fully vaccinated.      •You are in close contact with a person who is infected with SARS-CoV-2, or at high risk of exposure to SARS-CoV-2, in an institution in which COVID-19 is occurring.      •You are at risk of illness from new variants of the COVID-19 virus.        To protect others:     If you have symptoms of COVID-19, take steps to prevent the virus from spreading to others.  •Stay home. Leave your house only to seek medical care. Do not use public transport, if possible.      •Do not travel while you are sick.      •Wash your hands often with soap and water for at least 20 seconds. If soap and water are not available, use alcohol-based hand .      •Stay away from other members of your household. Let healthy household members care for children and pets, if possible. If you have to care for children or pets, wash your hands often and wear a mask. If possible, stay in your own room, separate from others. Use a different bathroom.      •Make sure that all people in your household wash their hands well and often.      •Cough or sneeze into a tissue or your sleeve or elbow. Do not cough or sneeze into your hand or into the air.        Where to find more information    •Centers for Disease Control and Prevention: www.cdc.gov/coronavirus      •World Health Organization: www.who.int/health-topics/coronavirus        Get help right away if:    •You have trouble breathing.      •You have pain or pressure in your chest.      •You have confusion.      •You have bluish lips and fingernails.      •You have difficulty waking from sleep.      •You have symptoms that get worse.      These symptoms may be an emergency. Get help right away. Call 911.   • Do not wait to see if the symptoms will go away.        •  Do not drive yourself to the hospital.         Summary    •COVID-19 is a respiratory infection that is caused by a virus.      •Some people with a severe COVID-19 infection develop severe disease, which may lead to acute respiratory distress syndrome and shock.      •The virus that causes COVID-19 is contagious. This means that it can spread from person to person through droplets from breathing, speaking, singing, coughing, or sneezing.      •Mild symptoms of COVID-19 can be managed at home with rest, fluids, and over-the-counter medicines.      This information is not intended to replace advice given to you by your health care provider. Make sure you discuss any questions you have with your health care provider.

## 2022-12-02 NOTE — ED PROVIDER NOTE - PATIENT PORTAL LINK FT
You can access the FollowMyHealth Patient Portal offered by [facility] by registering at the following website: [email or web address]. By joining [employer]’s FollowMyHealth portal, you will also be able to view your health information using other applications (apps) compatible with our system.

## 2022-12-02 NOTE — ED ADULT TRIAGE NOTE - CHIEF COMPLAINT QUOTE
fever x1, hx UTIS, was coughing yesterday but not today. had tylenol at 12. wheelchair bound from parkinsons

## 2022-12-02 NOTE — ED PROVIDER NOTE - OBJECTIVE STATEMENT
Patient is a 71 yo male with PMHx parkinson's dementia, recent admission for falls secondary to covid PNA and UTI presenting from home with fever of 100.4 and cough. As per patient wife at bedside she was recently ill with a URI; patient developed a fever of 100.4 with a dry cough since last night and was brought in to the ED. Patient aaox2, at baseline mental status, vitals stable, no FND. Patient received Tylenol PTA. Denies falls, headstrike, CP, SOB. Denies chills, dizziness, lightheadedness, dysphagia, dysarthria, diplopia, photophobia, syncope, SOB, CP, abdominal pain, neck pain, back pain, diarrhea, dysuria, hematuria, hematochezia, hematemesis, n/v, recent travel.

## 2022-12-02 NOTE — ED PROVIDER NOTE - PHYSICAL EXAMINATION
Constitutional: Well appearing, awake, alert, oriented to person, place, not time/situation at baseline mental status and in no apparent distress  ENMT: Airway patent nasal mucosa with bilateral congestion. Mouth with normal mucosa. Throat has no vesicles no oropharyngeal exudates and uvula is midline. No blood in the oropharynx  EYES: clear bilaterally, pupils equal, round and reactive to light  Cardiac: Regular rate, regular rhythm. Heart sounds S1, S2. No murmurs, rubs or gallops. Good capillary refill, 2+ pulses, no peripheral edema  Respiratory: Lungs CTAB, no use of accessory muscles, no crackles, satting 95% on RA in no distress  Gastrointestinal: Abdomen nondistended, non-tender, no rebound guarding or peritoneal signs  Genitourinary: No CVA tenderness, pelvis nontender, bladder nondistended  Musculoskeletal: Spine appears normal, range of motion is not limited, no muscle or joint tenderness  Neurological: Alert and oriented, no focal deficits, no motor or sensory deficits. CN 2-12 intact, PERRLA, EOMI, No FND  Skin: Skin normal color for race, warm, dry and intact, No evidence of rash

## 2022-12-02 NOTE — ED PROVIDER NOTE - PROGRESS NOTE DETAILS
JK - patient labs, UA, CXR WNL. Afebrile, vitals stable. Found to be covid-19 positive. satting 96% on RA. Ready and agreeable to DC with return precautions, close PCP follow up.

## 2022-12-02 NOTE — ED ADULT NURSE NOTE - OBJECTIVE STATEMENT
72y M brought in by wife who states pt had a fever of 100.4F last received Tylenol 12pm, pt denies any complaints at this time, denies cough or SOB, wife states she recently had a "head cold, both took at home covid test which were negative", pt with hx of parkinsons is able to ambulate but uses wheelchair at times, pt has limited verbal responses, pt usually is able to use bathroom independently but today pt presents to ED with condom catheter

## 2022-12-03 LAB
CULTURE RESULTS: NO GROWTH — SIGNIFICANT CHANGE UP
SPECIMEN SOURCE: SIGNIFICANT CHANGE UP

## 2023-01-28 ENCOUNTER — OFFICE (OUTPATIENT)
Dept: URBAN - METROPOLITAN AREA CLINIC 112 | Facility: CLINIC | Age: 73
Setting detail: OPHTHALMOLOGY
End: 2023-01-28
Payer: COMMERCIAL

## 2023-01-28 DIAGNOSIS — H53.2: ICD-10-CM

## 2023-01-28 DIAGNOSIS — H43.391: ICD-10-CM

## 2023-01-28 DIAGNOSIS — H25.13: ICD-10-CM

## 2023-01-28 DIAGNOSIS — H50.111: ICD-10-CM

## 2023-01-28 DIAGNOSIS — H16.223: ICD-10-CM

## 2023-01-28 PROCEDURE — 92014 COMPRE OPH EXAM EST PT 1/>: CPT | Performed by: OPHTHALMOLOGY

## 2023-01-28 PROCEDURE — 92250 FUNDUS PHOTOGRAPHY W/I&R: CPT | Performed by: OPHTHALMOLOGY

## 2023-01-28 ASSESSMENT — REFRACTION_MANIFEST
OS_SPHERE: +1.75
OD_ADD: +2.50
OS_AXIS: 080
OS_ADD: +2.50
OD_CYLINDER: SPH
OS_CYLINDER: -0.75
OD_VA1: 20/40
OD_SPHERE: +1.50
OS_VA1: 20/40

## 2023-01-28 ASSESSMENT — REFRACTION_CURRENTRX
OD_AXIS: 100
OD_AXIS: 110
OS_OVR_VA: 20/
OD_CYLINDER: -0.25
OS_CYLINDER: -0.25
OS_SPHERE: +1.50
OS_CYLINDER: -0.25
OS_AXIS: 100
OS_SPHERE: +3.50
OS_VPRISM_DIRECTION: SV
OD_CYLINDER: -0.50
OD_OVR_VA: 20/
OD_SPHERE: +3.75
OS_OVR_VA: 20/
OD_OVR_VA: 20/
OS_AXIS: 105
OD_VPRISM_DIRECTION: SV
OD_SPHERE: +1.50

## 2023-01-28 ASSESSMENT — KERATOMETRY
OD_K1POWER_DIOPTERS: 45.50
OS_K2POWER_DIOPTERS: 46.50
OD_AXISANGLE_DEGREES: 003
OS_K1POWER_DIOPTERS: 45.25
OS_AXISANGLE_DEGREES: 010
OD_K2POWER_DIOPTERS: 46.25

## 2023-01-28 ASSESSMENT — TONOMETRY
OS_IOP_MMHG: 15
OD_IOP_MMHG: 14

## 2023-01-28 ASSESSMENT — CONFRONTATIONAL VISUAL FIELD TEST (CVF)
OD_FINDINGS: FULL
OS_FINDINGS: FULL

## 2023-01-28 ASSESSMENT — SPHEQUIV_DERIVED
OD_SPHEQUIV: 1.25
OS_SPHEQUIV: 1.625
OS_SPHEQUIV: 1.375

## 2023-01-28 ASSESSMENT — REFRACTION_AUTOREFRACTION
OS_SPHERE: +2.50
OS_AXIS: 084
OD_AXIS: 096
OD_CYLINDER: -1.00
OD_SPHERE: +1.75
OS_CYLINDER: -1.75

## 2023-01-28 ASSESSMENT — VISUAL ACUITY
OS_BCVA: 20/40
OD_BCVA: 20/80-1

## 2023-01-28 ASSESSMENT — SUPERFICIAL PUNCTATE KERATITIS (SPK)
OS_SPK: 1+
OD_SPK: 1+

## 2023-01-28 ASSESSMENT — AXIALLENGTH_DERIVED
OD_AL: 22.3066
OS_AL: 22.2631
OS_AL: 22.1767

## 2023-02-22 ENCOUNTER — RX ONLY (RX ONLY)
Age: 73
End: 2023-02-22

## 2023-02-22 ENCOUNTER — OFFICE (OUTPATIENT)
Dept: URBAN - METROPOLITAN AREA CLINIC 116 | Facility: CLINIC | Age: 73
Setting detail: OPHTHALMOLOGY
End: 2023-02-22
Payer: COMMERCIAL

## 2023-02-22 DIAGNOSIS — Z01.00: ICD-10-CM

## 2023-02-22 PROCEDURE — 92014 COMPRE OPH EXAM EST PT 1/>: CPT | Performed by: OPTOMETRIST

## 2023-02-22 ASSESSMENT — REFRACTION_CURRENTRX
OD_AXIS: 110
OD_VPRISM_DIRECTION: SV
OD_AXIS: 100
OS_OVR_VA: 20/
OD_OVR_VA: 20/
OS_OVR_VA: 20/
OD_CYLINDER: -0.25
OS_AXIS: 100
OD_OVR_VA: 20/
OS_SPHERE: +3.50
OD_SPHERE: +1.50
OS_SPHERE: +1.50
OD_CYLINDER: -0.50
OS_VPRISM_DIRECTION: SV
OS_AXIS: 105
OS_CYLINDER: -0.25
OS_CYLINDER: -0.25
OD_SPHERE: +3.75

## 2023-02-22 ASSESSMENT — KERATOMETRY
OS_AXISANGLE_DEGREES: 035
OD_K1POWER_DIOPTERS: 42.00
OD_AXISANGLE_DEGREES: 165
OS_K1POWER_DIOPTERS: 45.75
OS_K2POWER_DIOPTERS: 46.75
OD_K2POWER_DIOPTERS: 46.25

## 2023-02-22 ASSESSMENT — AXIALLENGTH_DERIVED
OD_AL: 22.9886
OS_AL: 22.0132
OS_AL: 22.0557
OS_AL: 22.1412

## 2023-02-22 ASSESSMENT — REFRACTION_MANIFEST
OS_SPHERE: +2.00
OS_CYLINDER: -0.75
OS_VA1: 20/30
OD_CYLINDER: SPH
OD_SPHERE: +1.50
OD_VA1: 20/40
OS_AXIS: 080
OS_SPHERE: +1.75
OD_SPHERE: +1.50
OS_ADD: +2.50
OD_VA1: 20/25
OD_ADD: +2.50
OD_CYLINDER: SPH
OS_AXIS: 080
OS_ADD: +2.50
OD_ADD: +2.50
OS_CYLINDER: -0.75
OS_VA1: 20/40

## 2023-02-22 ASSESSMENT — REFRACTION_AUTOREFRACTION
OS_CYLINDER: -2.00
OD_AXIS: 090
OD_CYLINDER: -0.50
OD_SPHERE: +1.25
OS_SPHERE: +2.75
OS_AXIS: 090

## 2023-02-22 ASSESSMENT — SPHEQUIV_DERIVED
OS_SPHEQUIV: 1.375
OS_SPHEQUIV: 1.75
OS_SPHEQUIV: 1.625
OD_SPHEQUIV: 1

## 2023-02-22 ASSESSMENT — CONFRONTATIONAL VISUAL FIELD TEST (CVF)
OS_FINDINGS: FULL
OD_FINDINGS: FULL

## 2023-02-22 ASSESSMENT — SUPERFICIAL PUNCTATE KERATITIS (SPK)
OD_SPK: 1+
OS_SPK: 1+

## 2023-02-22 ASSESSMENT — VISUAL ACUITY
OS_BCVA: 20/40
OD_BCVA: 20/80-1

## 2023-02-22 NOTE — ED PROVIDER NOTE - CLINICAL SUMMARY MEDICAL DECISION MAKING FREE TEXT BOX
Letter available for . Copy also sent via [employer]. Patient is a 73 yo male with PMHx parkinson's dementia, recent admission for falls secondary to covid PNA and UTI presenting from home with fever of 100.4 and cough. As per patient wife at bedside she was recently ill with a URI; patient developed a fever of 100.4 with a dry cough since last night and was brought in to the ED. Patient aaox2, at baseline mental status, vitals stable, no FND, lungs and oropharynx clear, no peripheral edema. Will hydrate, check labs, r/o electrolyte abnormalities, check for infection with UA CXR RVP to r/o UTI PNA URI, reassess.

## 2023-03-23 ENCOUNTER — NON-APPOINTMENT (OUTPATIENT)
Age: 73
End: 2023-03-23

## 2023-05-20 ENCOUNTER — OFFICE (OUTPATIENT)
Dept: URBAN - METROPOLITAN AREA CLINIC 112 | Facility: CLINIC | Age: 73
Setting detail: OPHTHALMOLOGY
End: 2023-05-20
Payer: COMMERCIAL

## 2023-05-20 DIAGNOSIS — H16.222: ICD-10-CM

## 2023-05-20 DIAGNOSIS — H16.223: ICD-10-CM

## 2023-05-20 DIAGNOSIS — H25.13: ICD-10-CM

## 2023-05-20 DIAGNOSIS — H16.221: ICD-10-CM

## 2023-05-20 PROCEDURE — 83861 MICROFLUID ANALY TEARS: CPT | Performed by: OPHTHALMOLOGY

## 2023-05-20 PROCEDURE — 99213 OFFICE O/P EST LOW 20 MIN: CPT | Performed by: OPHTHALMOLOGY

## 2023-05-20 ASSESSMENT — SPHEQUIV_DERIVED
OD_SPHEQUIV: 1.125
OS_SPHEQUIV: 1.75
OS_SPHEQUIV: 1.375
OS_SPHEQUIV: 1.625

## 2023-05-20 ASSESSMENT — KERATOMETRY
OS_K1POWER_DIOPTERS: 45.75
OD_K2POWER_DIOPTERS: 46.25
OD_AXISANGLE_DEGREES: 165
OD_K1POWER_DIOPTERS: 42.00
OS_AXISANGLE_DEGREES: 035
OS_K2POWER_DIOPTERS: 46.75

## 2023-05-20 ASSESSMENT — REFRACTION_MANIFEST
OD_SPHERE: +1.50
OS_ADD: +2.50
OS_CYLINDER: -0.75
OS_ADD: +2.50
OD_VA1: 20/40
OD_CYLINDER: SPH
OD_CYLINDER: SPH
OD_VA1: 20/25
OS_CYLINDER: -0.75
OS_VA1: 20/40
OS_VA1: 20/30
OD_ADD: +2.50
OS_SPHERE: +2.00
OD_ADD: +2.50
OS_SPHERE: +1.75
OS_AXIS: 080
OD_SPHERE: +1.50
OS_AXIS: 080

## 2023-05-20 ASSESSMENT — REFRACTION_CURRENTRX
OD_SPHERE: +3.75
OD_VPRISM_DIRECTION: SV
OD_CYLINDER: -0.25
OS_SPHERE: +1.50
OS_OVR_VA: 20/
OD_AXIS: 100
OD_AXIS: 110
OS_CYLINDER: -0.25
OS_AXIS: 105
OD_OVR_VA: 20/
OS_VPRISM_DIRECTION: SV
OS_AXIS: 100
OD_CYLINDER: -0.50
OD_SPHERE: +1.50
OS_OVR_VA: 20/
OD_OVR_VA: 20/
OS_SPHERE: +3.50
OS_CYLINDER: -0.25

## 2023-05-20 ASSESSMENT — CONFRONTATIONAL VISUAL FIELD TEST (CVF)
OS_FINDINGS: FULL
OD_FINDINGS: FULL

## 2023-05-20 ASSESSMENT — REFRACTION_AUTOREFRACTION
OS_AXIS: 092
OS_SPHERE: +2.50
OD_CYLINDER: -0.75
OD_AXIS: 094
OD_SPHERE: +1.50
OS_CYLINDER: -1.50

## 2023-05-20 ASSESSMENT — AXIALLENGTH_DERIVED
OS_AL: 22.1412
OD_AL: 22.9424
OS_AL: 22.0557
OS_AL: 22.0132

## 2023-05-20 ASSESSMENT — TONOMETRY
OS_IOP_MMHG: 12
OD_IOP_MMHG: 12

## 2023-05-20 ASSESSMENT — SUPERFICIAL PUNCTATE KERATITIS (SPK)
OS_SPK: T
OD_SPK: T

## 2023-05-20 ASSESSMENT — VISUAL ACUITY
OS_BCVA: 20/30+1
OD_BCVA: 20/50-1

## 2023-06-02 ENCOUNTER — INPATIENT (INPATIENT)
Facility: HOSPITAL | Age: 73
LOS: 2 days | Discharge: HOME | DRG: 92 | End: 2023-06-05
Attending: HOSPITALIST | Admitting: INTERNAL MEDICINE
Payer: COMMERCIAL

## 2023-06-02 VITALS
OXYGEN SATURATION: 96 % | DIASTOLIC BLOOD PRESSURE: 69 MMHG | TEMPERATURE: 98 F | SYSTOLIC BLOOD PRESSURE: 119 MMHG | RESPIRATION RATE: 20 BRPM | HEART RATE: 80 BPM

## 2023-06-02 DIAGNOSIS — Z98.890 OTHER SPECIFIED POSTPROCEDURAL STATES: Chronic | ICD-10-CM

## 2023-06-02 DIAGNOSIS — R41.82 ALTERED MENTAL STATUS, UNSPECIFIED: ICD-10-CM

## 2023-06-02 LAB
ALBUMIN SERPL ELPH-MCNC: 3.5 G/DL — SIGNIFICANT CHANGE UP (ref 3.3–5.2)
ALP SERPL-CCNC: 70 U/L — SIGNIFICANT CHANGE UP (ref 40–120)
ALT FLD-CCNC: 9 U/L — SIGNIFICANT CHANGE UP
ANION GAP SERPL CALC-SCNC: 10 MMOL/L — SIGNIFICANT CHANGE UP (ref 5–17)
APAP SERPL-MCNC: 3.9 UG/ML — LOW (ref 10–26)
APPEARANCE UR: CLEAR — SIGNIFICANT CHANGE UP
APTT BLD: 27.9 SEC — SIGNIFICANT CHANGE UP (ref 27.5–35.5)
AST SERPL-CCNC: 14 U/L — SIGNIFICANT CHANGE UP
BASE EXCESS BLDV CALC-SCNC: 3.5 MMOL/L — HIGH (ref -2–3)
BASOPHILS # BLD AUTO: 0.05 K/UL — SIGNIFICANT CHANGE UP (ref 0–0.2)
BASOPHILS NFR BLD AUTO: 0.6 % — SIGNIFICANT CHANGE UP (ref 0–2)
BILIRUB SERPL-MCNC: 0.5 MG/DL — SIGNIFICANT CHANGE UP (ref 0.4–2)
BILIRUB UR-MCNC: NEGATIVE — SIGNIFICANT CHANGE UP
BLD GP AB SCN SERPL QL: SIGNIFICANT CHANGE UP
BUN SERPL-MCNC: 18.7 MG/DL — SIGNIFICANT CHANGE UP (ref 8–20)
CA-I SERPL-SCNC: 1.02 MMOL/L — LOW (ref 1.15–1.33)
CALCIUM SERPL-MCNC: 8.9 MG/DL — SIGNIFICANT CHANGE UP (ref 8.4–10.5)
CHLORIDE BLDV-SCNC: 105 MMOL/L — SIGNIFICANT CHANGE UP (ref 96–108)
CHLORIDE SERPL-SCNC: 104 MMOL/L — SIGNIFICANT CHANGE UP (ref 96–108)
CO2 SERPL-SCNC: 23 MMOL/L — SIGNIFICANT CHANGE UP (ref 22–29)
COLOR SPEC: YELLOW — SIGNIFICANT CHANGE UP
CREAT SERPL-MCNC: 0.99 MG/DL — SIGNIFICANT CHANGE UP (ref 0.5–1.3)
DIFF PNL FLD: NEGATIVE — SIGNIFICANT CHANGE UP
EGFR: 80 ML/MIN/1.73M2 — SIGNIFICANT CHANGE UP
EOSINOPHIL # BLD AUTO: 0.34 K/UL — SIGNIFICANT CHANGE UP (ref 0–0.5)
EOSINOPHIL NFR BLD AUTO: 4.4 % — SIGNIFICANT CHANGE UP (ref 0–6)
ETHANOL SERPL-MCNC: <10 MG/DL — SIGNIFICANT CHANGE UP (ref 0–9)
GAS PNL BLDV: 133 MMOL/L — LOW (ref 136–145)
GAS PNL BLDV: SIGNIFICANT CHANGE UP
GLUCOSE BLDV-MCNC: 92 MG/DL — SIGNIFICANT CHANGE UP (ref 70–99)
GLUCOSE SERPL-MCNC: 96 MG/DL — SIGNIFICANT CHANGE UP (ref 70–99)
GLUCOSE UR QL: NEGATIVE MG/DL — SIGNIFICANT CHANGE UP
HCO3 BLDV-SCNC: 28 MMOL/L — SIGNIFICANT CHANGE UP (ref 22–29)
HCT VFR BLD CALC: 39.6 % — SIGNIFICANT CHANGE UP (ref 39–50)
HCT VFR BLDA CALC: 40 % — SIGNIFICANT CHANGE UP
HGB BLD CALC-MCNC: 13.4 G/DL — SIGNIFICANT CHANGE UP (ref 12.6–17.4)
HGB BLD-MCNC: 12.7 G/DL — LOW (ref 13–17)
IMM GRANULOCYTES NFR BLD AUTO: 0.4 % — SIGNIFICANT CHANGE UP (ref 0–0.9)
INR BLD: 1.14 RATIO — SIGNIFICANT CHANGE UP (ref 0.88–1.16)
KETONES UR-MCNC: NEGATIVE — SIGNIFICANT CHANGE UP
LACTATE BLDV-MCNC: 1.3 MMOL/L — SIGNIFICANT CHANGE UP (ref 0.5–2)
LEUKOCYTE ESTERASE UR-ACNC: NEGATIVE — SIGNIFICANT CHANGE UP
LYMPHOCYTES # BLD AUTO: 1.3 K/UL — SIGNIFICANT CHANGE UP (ref 1–3.3)
LYMPHOCYTES # BLD AUTO: 16.7 % — SIGNIFICANT CHANGE UP (ref 13–44)
MAGNESIUM SERPL-MCNC: 2 MG/DL — SIGNIFICANT CHANGE UP (ref 1.8–2.6)
MCHC RBC-ENTMCNC: 28.5 PG — SIGNIFICANT CHANGE UP (ref 27–34)
MCHC RBC-ENTMCNC: 32.1 GM/DL — SIGNIFICANT CHANGE UP (ref 32–36)
MCV RBC AUTO: 88.8 FL — SIGNIFICANT CHANGE UP (ref 80–100)
MONOCYTES # BLD AUTO: 0.74 K/UL — SIGNIFICANT CHANGE UP (ref 0–0.9)
MONOCYTES NFR BLD AUTO: 9.5 % — SIGNIFICANT CHANGE UP (ref 2–14)
NEUTROPHILS # BLD AUTO: 5.34 K/UL — SIGNIFICANT CHANGE UP (ref 1.8–7.4)
NEUTROPHILS NFR BLD AUTO: 68.4 % — SIGNIFICANT CHANGE UP (ref 43–77)
NITRITE UR-MCNC: NEGATIVE — SIGNIFICANT CHANGE UP
PCO2 BLDV: 47 MMHG — SIGNIFICANT CHANGE UP (ref 42–55)
PH BLDV: 7.39 — SIGNIFICANT CHANGE UP (ref 7.32–7.43)
PH UR: 6 — SIGNIFICANT CHANGE UP (ref 5–8)
PLATELET # BLD AUTO: 174 K/UL — SIGNIFICANT CHANGE UP (ref 150–400)
PO2 BLDV: 146 MMHG — HIGH (ref 25–45)
POTASSIUM BLDV-SCNC: 6 MMOL/L — HIGH (ref 3.5–5.1)
POTASSIUM SERPL-MCNC: 4.9 MMOL/L — SIGNIFICANT CHANGE UP (ref 3.5–5.3)
POTASSIUM SERPL-SCNC: 4.9 MMOL/L — SIGNIFICANT CHANGE UP (ref 3.5–5.3)
PROT SERPL-MCNC: 6.8 G/DL — SIGNIFICANT CHANGE UP (ref 6.6–8.7)
PROT UR-MCNC: 15
PROTHROM AB SERPL-ACNC: 13.2 SEC — SIGNIFICANT CHANGE UP (ref 10.5–13.4)
RAPID RVP RESULT: SIGNIFICANT CHANGE UP
RBC # BLD: 4.46 M/UL — SIGNIFICANT CHANGE UP (ref 4.2–5.8)
RBC # FLD: 13.2 % — SIGNIFICANT CHANGE UP (ref 10.3–14.5)
RBC CASTS # UR COMP ASSIST: SIGNIFICANT CHANGE UP /HPF (ref 0–4)
SALICYLATES SERPL-MCNC: <0.6 MG/DL — LOW (ref 10–20)
SAO2 % BLDV: 99.5 % — SIGNIFICANT CHANGE UP
SARS-COV-2 RNA SPEC QL NAA+PROBE: SIGNIFICANT CHANGE UP
SODIUM SERPL-SCNC: 137 MMOL/L — SIGNIFICANT CHANGE UP (ref 135–145)
SP GR SPEC: 1.02 — SIGNIFICANT CHANGE UP (ref 1.01–1.02)
T4 AB SER-ACNC: 9.1 UG/DL — SIGNIFICANT CHANGE UP (ref 4.5–12)
TROPONIN T SERPL-MCNC: <0.01 NG/ML — SIGNIFICANT CHANGE UP (ref 0–0.06)
TSH SERPL-MCNC: 0.45 UIU/ML — SIGNIFICANT CHANGE UP (ref 0.27–4.2)
UROBILINOGEN FLD QL: NEGATIVE MG/DL — SIGNIFICANT CHANGE UP
WBC # BLD: 7.8 K/UL — SIGNIFICANT CHANGE UP (ref 3.8–10.5)
WBC # FLD AUTO: 7.8 K/UL — SIGNIFICANT CHANGE UP (ref 3.8–10.5)
WBC UR QL: SIGNIFICANT CHANGE UP /HPF (ref 0–5)

## 2023-06-02 PROCEDURE — 71045 X-RAY EXAM CHEST 1 VIEW: CPT | Mod: 26

## 2023-06-02 PROCEDURE — 74177 CT ABD & PELVIS W/CONTRAST: CPT | Mod: 26,MG

## 2023-06-02 PROCEDURE — 72125 CT NECK SPINE W/O DYE: CPT | Mod: 26,MG

## 2023-06-02 PROCEDURE — 70496 CT ANGIOGRAPHY HEAD: CPT | Mod: 26,MA

## 2023-06-02 PROCEDURE — 70450 CT HEAD/BRAIN W/O DYE: CPT | Mod: 26,MG

## 2023-06-02 PROCEDURE — 0042T: CPT | Mod: MA

## 2023-06-02 PROCEDURE — 99285 EMERGENCY DEPT VISIT HI MDM: CPT

## 2023-06-02 PROCEDURE — 70498 CT ANGIOGRAPHY NECK: CPT | Mod: 26,MA

## 2023-06-02 PROCEDURE — 71260 CT THORAX DX C+: CPT | Mod: 26,MG

## 2023-06-02 PROCEDURE — G1004: CPT

## 2023-06-02 PROCEDURE — 99223 1ST HOSP IP/OBS HIGH 75: CPT

## 2023-06-02 RX ORDER — SODIUM CHLORIDE 9 MG/ML
1000 INJECTION, SOLUTION INTRAVENOUS ONCE
Refills: 0 | Status: COMPLETED | OUTPATIENT
Start: 2023-06-02 | End: 2023-06-02

## 2023-06-02 RX ORDER — CARBIDOPA AND LEVODOPA 25; 100 MG/1; MG/1
1 TABLET ORAL ONCE
Refills: 0 | Status: COMPLETED | OUTPATIENT
Start: 2023-06-02 | End: 2023-06-02

## 2023-06-02 RX ORDER — ACETAMINOPHEN 500 MG
650 TABLET ORAL EVERY 6 HOURS
Refills: 0 | Status: DISCONTINUED | OUTPATIENT
Start: 2023-06-02 | End: 2023-06-05

## 2023-06-02 RX ORDER — ONDANSETRON 8 MG/1
4 TABLET, FILM COATED ORAL EVERY 8 HOURS
Refills: 0 | Status: DISCONTINUED | OUTPATIENT
Start: 2023-06-02 | End: 2023-06-05

## 2023-06-02 RX ORDER — LANOLIN ALCOHOL/MO/W.PET/CERES
3 CREAM (GRAM) TOPICAL AT BEDTIME
Refills: 0 | Status: DISCONTINUED | OUTPATIENT
Start: 2023-06-02 | End: 2023-06-05

## 2023-06-02 RX ADMIN — SODIUM CHLORIDE 1000 MILLILITER(S): 9 INJECTION, SOLUTION INTRAVENOUS at 14:02

## 2023-06-02 RX ADMIN — SODIUM CHLORIDE 1000 MILLILITER(S): 9 INJECTION, SOLUTION INTRAVENOUS at 13:02

## 2023-06-02 RX ADMIN — Medication 3 MILLIGRAM(S): at 21:36

## 2023-06-02 NOTE — H&P ADULT - NSHPPHYSICALEXAM_GEN_ALL_CORE
T(C): 36.8 (06-02-23 @ 23:30), Max: 36.9 (06-02-23 @ 22:07)  HR: 64 (06-02-23 @ 23:30) (64 - 98)  BP: 147/81 (06-02-23 @ 23:30) (119/69 - 147/81)  RR: 18 (06-02-23 @ 23:30) (18 - 20)  SpO2: 95% (06-02-23 @ 23:30) (95% - 98%)    GENERAL: patient appears well, no acute distress, appropriate, pleasant  EYES: sclera clear, no exudates  ENMT: oropharynx clear without erythema, no exudates, moist mucous membranes  NECK: supple, soft, no thyromegaly noted  LUNGS: good air entry bilaterally, clear to auscultation, symmetric breath sounds, no wheezing or rhonchi appreciated  HEART: soft S1/S2, regular rate and rhythm, no murmurs noted, no lower extremity edema  GASTROINTESTINAL: abdomen is soft, nontender, nondistended, normoactive bowel sounds, no palpable masses  INTEGUMENT: good skin turgor, warm skin, appears well perfused  MUSCULOSKELETAL: no clubbing or cyanosis, no obvious deformity  NEUROLOGIC: dementia, +tremors, awake, alert but oriented x0, unable to assess muscle tone as pt not able to follow directions   PSYCHIATRIC: mood is good, calm   HEME/LYMPH: no palpable supraclavicular nodules, no obvious ecchymosis or petechiae

## 2023-06-02 NOTE — ED PROVIDER NOTE - CLINICAL SUMMARY MEDICAL DECISION MAKING FREE TEXT BOX
72yM with Parkinson's disease and dementia who presents with AMS. CT Head/c spine negative for bleed/acute fracture. Labs, CXR, urine studies, EKG ordered in addition to 1L IVF.

## 2023-06-02 NOTE — H&P ADULT - ASSESSMENT
71yo M with PMHx of Parkinson's disease, dementia presented to ED c/o confusion, lethargy and low BP.    Acute encephalopathy  -likely related to atropine   -CTH, CTA head/neck without acute findings   -UA negative, no sign of acute infectious process   -if symptoms not improve will consider MRI head   -Neurology consulted by ED  -passed bedside dysphagia screen but will keep on pureed until mentation improves      Urinary retention  -likely side effect of atropine   -bladder scan with 600cc, straight cath ordered  -repeat bladder scan in 6hrs, if retaining will place Bales cath     Parkinson's disease   -Pt currently not taking any meds as per wife   -prescribed rytary by neurologist, wife does not want it given to him at this time     DVT ppx  -lovenox

## 2023-06-02 NOTE — H&P ADULT - HISTORY OF PRESENT ILLNESS
71yo M with PMHx of Parkinson's disease, dementia presented to ED c/o confusion, lethargy and low BP. Wife reports that at baseline is AAOx4, he speaks very well and walks about half a mile daily with his aide. Pt in usual state of health this morning had breakfast pt had normal breakfast this morning and around 8:30 wife gave him atropine drop for the first time he was prescribed for drooling then around 9am wife noted that pt is eating his fruit cup she gave him with his eyes closed, she went over the check on him pt was not speaking and appears confused. She took the pt outside, check his BP which was in the 80s and his mouth appeared very dry she called ambulance. Wife states that he was prescribed Rytary for Parkinson's after his visit to his new Neurologist Dr. Miller but she has not started him on the medication yet. Wife states that pt did not tolerated Sinemet due to excessive sleep. In the ED vitals stable, labs wnl, UA negative, CTH, CTA neck, head, CT chest, a/p all unremarkable.

## 2023-06-02 NOTE — ED ADULT NURSE REASSESSMENT NOTE - NS ED NURSE REASSESS COMMENT FT1
Assumed care of pt at 19:15 as stated in report from RN dominik cee. Charting as noted. Patient A&O and nonverbal at baseline d/t previous TIA. Denies pain/discomfort, denies CP/SOB. Updated on the plan of care. Call bell within reach, bed locked in lowest position. IV site flushed w/ NS. No redness, swelling or pain noted to site. No signs of acute distress noted, safety maintained. Pt remains on CM in NSR.

## 2023-06-02 NOTE — ED PROVIDER NOTE - PROGRESS NOTE DETAILS
Doa: patient was taken for priority CT scan on arrival. Dao: rechecked patient. labs drawn and pending. wife states that patient has had altered mental status like this in the past with a UTI. straight cath ordered Dao: patient at CT. noted to have L posterior rib ttp on repeat exam. ct chest/abdomen/pelvis as patient remains altered, labs unrevealing, and did have fall this AM (without head trauma) Given the significant and immediate threats to this patient based on initial presentation, the benefits of emergency contrast-enhanced CT imaging without obtaining consent or GFR/creatinine serum level results greatly outweigh the potential risk of harms including those due to contrast induced nephropathy.

## 2023-06-02 NOTE — ED ADULT NURSE NOTE - NSFALLHARMRISKINTERV_ED_ALL_ED

## 2023-06-02 NOTE — ED ADULT NURSE REASSESSMENT NOTE - NS ED NURSE REASSESS COMMENT FT1
straight cath performed and 550cc of concentrated urine was drained, samples collected and sent to the lab for evaluation, MD aware that straight cath performed.

## 2023-06-02 NOTE — ED ADULT NURSE NOTE - CHIEF COMPLAINT QUOTE
as per wife pt was having breakfast and around 0900 he started to be altered, not speaking, not responding to family. MD Dc at bedside [id number]

## 2023-06-02 NOTE — ED PROVIDER NOTE - ATTENDING CONTRIBUTION TO CARE
I, Richy Gutierrez, personally saw the patient with the resident, and completed the key components of the history and physical exam. I then discussed the management plan with the resident.    73 yo M hx Parkinson's disease and dementia p/w acute altered mental status while he was eating around 9 am. Family report he was sleepy yesterday but then became more awake after. Family report he was weak, slumped over, slurred speech, and weak in the legs. Lab values do not require emergent intervention. CT head and cervical spine negative. on reassessment patient still has slurred speech but more awake now. no facial droop, no arm drift. weak in the b/l legs but withdraws equally to pain. will get CTA head and neck, CT perfusion. CT chest/abd/pelvis, will need admission for altered mental status.

## 2023-06-02 NOTE — ED PROVIDER NOTE - OBJECTIVE STATEMENT
72yM with Parkinson's disease and dementia who presents with AMS. Yesterday the patient had a "[place]" at the beach. He was going to go with his wife Ate a normal breakfast. Suddenly at 09:00 his eyes were closed and he told his wife to leave him alone, then became sleepy/altered. He tried to stand up but then his legs gave out and he fell down scraping his back on the couch. On arrival, pt following commands but unable to state his name. Wife notes he did have a fall on 5/26 too but this fall was witnessed and he did not hit head. Wife notes seen at endocrinology yesterday and noted to have Hashimoto's - also was noted to have lower BP but was NOT altered.

## 2023-06-02 NOTE — ED PROVIDER NOTE - PHYSICAL EXAMINATION
General: altered, eyes closed, responsive to pain and can follow commands intermittently  Head: NC, AT  EENT: no scleral icterus  Cardiac: RRR, no apparent murmurs   Respiratory:  no respiratory distress   MSK/Vascular: full ROM, soft compartments, warm extremities  Skin: no acute rash   Neuro: responsive to pain, can follow commands but unable to state his name or have conversation  Psych: calm General: altered, eyes closed, responsive to pain and can follow commands intermittently  Head: NC, AT  EENT: no scleral icterus  Cardiac: RRR, no apparent murmurs, +Chest wall TTP L shoulder    Respiratory:  no respiratory distress   MSK/Vascular: full ROM, soft compartments, warm extremities  Abd: slightly distended, soft   Skin: no acute rash, abrasion to L posterior shoulder superficial   Neuro: responsive to pain, can follow commands but unable to state his name or have conversation  Psych: calm

## 2023-06-02 NOTE — ED ADULT NURSE NOTE - OBJECTIVE STATEMENT
BIBEMS from home for lethargy and confusion. Family states, "patient was eating with his eye closed and patient was not following commands". Family states when patient was assisted to walk  when he became "limp" and SBP was in the 80's. C/o back abrasion d/t fall. Family states patient is usually ambulatory with assistance, talkative, and alert and oriented. Hx of Parkinson's. Pt remains on cm and . Pt lethargic, responsive to pain, respirations even and unlabored, skin warm and dry, color appropriate for ethnicity. Updated pt on plan of care. Will continue to monitor.

## 2023-06-02 NOTE — ED ADULT TRIAGE NOTE - CHIEF COMPLAINT QUOTE
as per wife pt was having breakfast and around 0900 he started to be altered, not speaking, not responding to family. MD Dc at bedside [id number]

## 2023-06-03 LAB
ANION GAP SERPL CALC-SCNC: 11 MMOL/L — SIGNIFICANT CHANGE UP (ref 5–17)
BUN SERPL-MCNC: 16.2 MG/DL — SIGNIFICANT CHANGE UP (ref 8–20)
CALCIUM SERPL-MCNC: 8.5 MG/DL — SIGNIFICANT CHANGE UP (ref 8.4–10.5)
CHLORIDE SERPL-SCNC: 105 MMOL/L — SIGNIFICANT CHANGE UP (ref 96–108)
CO2 SERPL-SCNC: 25 MMOL/L — SIGNIFICANT CHANGE UP (ref 22–29)
CREAT SERPL-MCNC: 0.95 MG/DL — SIGNIFICANT CHANGE UP (ref 0.5–1.3)
CULTURE RESULTS: NO GROWTH — SIGNIFICANT CHANGE UP
EGFR: 85 ML/MIN/1.73M2 — SIGNIFICANT CHANGE UP
GLUCOSE BLDC GLUCOMTR-MCNC: 91 MG/DL — SIGNIFICANT CHANGE UP (ref 70–99)
GLUCOSE SERPL-MCNC: 95 MG/DL — SIGNIFICANT CHANGE UP (ref 70–99)
HCT VFR BLD CALC: 37.1 % — LOW (ref 39–50)
HGB BLD-MCNC: 11.9 G/DL — LOW (ref 13–17)
MCHC RBC-ENTMCNC: 28 PG — SIGNIFICANT CHANGE UP (ref 27–34)
MCHC RBC-ENTMCNC: 32.1 GM/DL — SIGNIFICANT CHANGE UP (ref 32–36)
MCV RBC AUTO: 87.3 FL — SIGNIFICANT CHANGE UP (ref 80–100)
PLATELET # BLD AUTO: 162 K/UL — SIGNIFICANT CHANGE UP (ref 150–400)
POTASSIUM SERPL-MCNC: 4 MMOL/L — SIGNIFICANT CHANGE UP (ref 3.5–5.3)
POTASSIUM SERPL-SCNC: 4 MMOL/L — SIGNIFICANT CHANGE UP (ref 3.5–5.3)
RBC # BLD: 4.25 M/UL — SIGNIFICANT CHANGE UP (ref 4.2–5.8)
RBC # FLD: 13.2 % — SIGNIFICANT CHANGE UP (ref 10.3–14.5)
SODIUM SERPL-SCNC: 141 MMOL/L — SIGNIFICANT CHANGE UP (ref 135–145)
SPECIMEN SOURCE: SIGNIFICANT CHANGE UP
WBC # BLD: 6.41 K/UL — SIGNIFICANT CHANGE UP (ref 3.8–10.5)
WBC # FLD AUTO: 6.41 K/UL — SIGNIFICANT CHANGE UP (ref 3.8–10.5)

## 2023-06-03 PROCEDURE — 99223 1ST HOSP IP/OBS HIGH 75: CPT

## 2023-06-03 PROCEDURE — 93010 ELECTROCARDIOGRAM REPORT: CPT

## 2023-06-03 PROCEDURE — 99232 SBSQ HOSP IP/OBS MODERATE 35: CPT

## 2023-06-03 RX ORDER — ASPIRIN/CALCIUM CARB/MAGNESIUM 324 MG
81 TABLET ORAL DAILY
Refills: 0 | Status: DISCONTINUED | OUTPATIENT
Start: 2023-06-03 | End: 2023-06-05

## 2023-06-03 RX ORDER — ENOXAPARIN SODIUM 100 MG/ML
40 INJECTION SUBCUTANEOUS EVERY 24 HOURS
Refills: 0 | Status: DISCONTINUED | OUTPATIENT
Start: 2023-06-03 | End: 2023-06-05

## 2023-06-03 RX ORDER — CARBIDOPA AND LEVODOPA 25; 100 MG/1; MG/1
1 TABLET ORAL
Refills: 0 | DISCHARGE

## 2023-06-03 RX ORDER — ATROPINE SULFATE 1 %
1 DROPS OPHTHALMIC (EYE)
Refills: 0 | DISCHARGE

## 2023-06-03 RX ADMIN — Medication 3 MILLIGRAM(S): at 23:09

## 2023-06-03 RX ADMIN — ENOXAPARIN SODIUM 40 MILLIGRAM(S): 100 INJECTION SUBCUTANEOUS at 05:43

## 2023-06-03 RX ADMIN — Medication 0.5 MILLIGRAM(S): at 16:00

## 2023-06-03 RX ADMIN — Medication 81 MILLIGRAM(S): at 11:56

## 2023-06-03 NOTE — PATIENT PROFILE ADULT - FALL HARM RISK - HARM RISK INTERVENTIONS

## 2023-06-03 NOTE — PROGRESS NOTE ADULT - ASSESSMENT
73yo M with PMHx of Parkinson's disease, dementia presented to ED c/o confusion, lethargy and low BP.    1-Acute encephalopathy  CTof head reviewed   with weakness and speech problems  r/o stroke   MR if brain ordered   neurology consulted   -passed bedside dysphagia screen but will keep on pureed until mentation improves      2-Urinary retention  recurrent   hagan inserted   cont flomax '    -bladder scan with 600cc, straight cath ordered  -repeat bladder scan in 6hrs, if retaining will place Hagan cath     Parkinson's disease   -Pt currently not taking any meds as per wife   -prescribed rytary by neurologist, wife does not want it given to him at this time     DVT ppx  -lovenox      73yo M with PMHx of Parkinson's disease, dementia presented to ED c/o confusion, lethargy and low BP.    1-Acute encephalopathy  with weakness and speech problems  Ct of head brain neck reviewed     r/o stroke   MR if brain ordered   neurology consulted   -passed bedside dysphagia screen but will keep on pureed until mentation improves      2-Urinary retention  recurrent   hagan inserted   cont flomax '    -bladder scan with 600cc, straight cath ordered  -repeat bladder scan in 6hrs, if retaining will place Hagan cath     Parkinson's disease   -Pt currently not taking any meds as per wife   -prescribed rytary by neurologist, wife does not want it given to him at this time     DVT ppx  -lovenox         d/w pt's wife at the bedside

## 2023-06-03 NOTE — PROGRESS NOTE ADULT - SUBJECTIVE AND OBJECTIVE BOX
Patient is a 72y old  Male who presents with a chief complaint of Acute encephalopathy (2023 22:40)      Patient seen and examined at bedside. No overnight events reported.     ALLERGIES:  Pen-Vee K (Rash)    MEDICATIONS  (STANDING):  aspirin enteric coated 81 milliGRAM(s) Oral daily  enoxaparin Injectable 40 milliGRAM(s) SubCutaneous every 24 hours  melatonin 3 milliGRAM(s) Oral at bedtime    MEDICATIONS  (PRN):  acetaminophen     Tablet .. 650 milliGRAM(s) Oral every 6 hours PRN Temp greater or equal to 38C (100.4F), Mild Pain (1 - 3)  aluminum hydroxide/magnesium hydroxide/simethicone Suspension 30 milliLiter(s) Oral every 4 hours PRN Dyspepsia  ondansetron Injectable 4 milliGRAM(s) IV Push every 8 hours PRN Nausea and/or Vomiting    Vital Signs Last 24 Hrs  T(F): 98.5 (2023 07:30), Max: 98.5 (2023 22:07)  HR: 74 (2023 07:30) (64 - 98)  BP: 158/90 (2023 07:30) (119/69 - 158/90)  RR: 18 (2023 23:30) (18 - 20)  SpO2: 95% (2023 07:30) (95% - 98%)  I&O's Summary    2023 07:01  -  2023 10:05  --------------------------------------------------------  IN: 0 mL / OUT: 500 mL / NET: -500 mL        PHYSICAL EXAM:  General: awake confused slow responses   Neck: supple   Lungs: CTA bilateral  Cardio: RRR, S1/S2, No murmur  Abdomen: Soft, Nontender, Nondistended; Bowel sounds present  Extremities: No calf tenderness, No pitting edema  Neuro : awake confused slow responses and speech heavy, Pt is able to follow simple commands   RLE weak , left lower ext MS normal  tremors in both arm with movements       LABS:                        11.9   6.41  )-----------( 162      ( 2023 02:10 )             37.1     06-03    141  |  105  |  16.2  ----------------------------<  95  4.0   |  25.0  |  0.95    Ca    8.5      2023 02:10  Mg     2.0     06-02    TPro  6.8  /  Alb  3.5  /  TBili  0.5  /  DBili  x   /  AST  14  /  ALT  9   /  AlkPhos  70  06-02          PT/INR - ( 2023 14:39 )   PT: 13.2 sec;   INR: 1.14 ratio         PTT - ( 2023 14:39 )  PTT:27.9 sec            TSH 0.45   TSH with FT4 reflex --  Total T3 --    12:42 - VBG - pH: 7.390 | pCO2: 47    | pO2: 146   | Lactate: 1.30             POCT Blood Glucose.: 91 mg/dL (2023 00:02)  POCT Blood Glucose.: 122 mg/dL (2023 11:14)      Urinalysis Basic - ( 2023 14:02 )    Color: Yellow / Appearance: Clear / S.020 / pH: x  Gluc: x / Ketone: Negative  / Bili: Negative / Urobili: Negative mg/dL   Blood: x / Protein: 15 / Nitrite: Negative   Leuk Esterase: Negative / RBC: 0-2 /HPF / WBC 0-2 /HPF   Sq Epi: x / Non Sq Epi: x / Bacteria: x          RADIOLOGY & ADDITIONAL TESTS:       Patient is a 72y old  Male who presents with a chief complaint of Acute encephalopathy (2023 22:40)      Patient seen and examined at bedside this am 'wife is at the bedside   Pt is awake however can not obtain any history   speaking very little ,not oriented   per wife at baseline he is oriented and can have normal conversation     she reported that yesterday he was sleepy , weak on standing could not walk         ALLERGIES:  Pen-Vee K (Rash)    MEDICATIONS  (STANDING):  aspirin enteric coated 81 milliGRAM(s) Oral daily  enoxaparin Injectable 40 milliGRAM(s) SubCutaneous every 24 hours  melatonin 3 milliGRAM(s) Oral at bedtime    MEDICATIONS  (PRN):  acetaminophen     Tablet .. 650 milliGRAM(s) Oral every 6 hours PRN Temp greater or equal to 38C (100.4F), Mild Pain (1 - 3)  aluminum hydroxide/magnesium hydroxide/simethicone Suspension 30 milliLiter(s) Oral every 4 hours PRN Dyspepsia  ondansetron Injectable 4 milliGRAM(s) IV Push every 8 hours PRN Nausea and/or Vomiting    Vital Signs Last 24 Hrs  T(F): 98.5 (2023 07:30), Max: 98.5 (2023 22:07)  HR: 74 (2023 07:30) (64 - 98)  BP: 158/90 (2023 07:30) (119/69 - 158/90)  RR: 18 (2023 23:30) (18 - 20)  SpO2: 95% (2023 07:30) (95% - 98%)  I&O's Summary    2023 07:01  -  2023 10:05  --------------------------------------------------------  IN: 0 mL / OUT: 500 mL / NET: -500 mL        PHYSICAL EXAM:  General: awake confused slow responses   Lungs: CTA bilateral  Cardio: RRR, S1/S2, No murmur  Abdomen: Soft, Nontender, Nondistended; Bowel sounds present  Extremities: No calf tenderness, No pitting edema  Neuro : awake confused slow responses and speech heavy, Pt is able to follow simple commands   RLE weak , left lower ext MS normal  tremors in both arm with movements       LABS:                        11.9   6.41  )-----------( 162      ( 2023 02:10 )             37.1     -    141  |  105  |  16.2  ----------------------------<  95  4.0   |  25.0  |  0.95    Ca    8.5      2023 02:10  Mg     2.0     -    TPro  6.8  /  Alb  3.5  /  TBili  0.5  /  DBili  x   /  AST  14  /  ALT  9   /  AlkPhos  70  06-02          PT/INR - ( 2023 14:39 )   PT: 13.2 sec;   INR: 1.14 ratio         PTT - ( 2023 14:39 )  PTT:27.9 sec            TSH 0.45   TSH with FT4 reflex --  Total T3 --    12:42 - VBG - pH: 7.390 | pCO2: 47    | pO2: 146   | Lactate: 1.30             POCT Blood Glucose.: 91 mg/dL (2023 00:02)  POCT Blood Glucose.: 122 mg/dL (2023 11:14)      Urinalysis Basic - ( 2023 14:02 )    Color: Yellow / Appearance: Clear / S.020 / pH: x  Gluc: x / Ketone: Negative  / Bili: Negative / Urobili: Negative mg/dL   Blood: x / Protein: 15 / Nitrite: Negative   Leuk Esterase: Negative / RBC: 0-2 /HPF / WBC 0-2 /HPF   Sq Epi: x / Non Sq Epi: x / Bacteria: x          RADIOLOGY & ADDITIONAL TESTS:    c< from: CT Chest w/ IV Cont (23 @ 18:32) >  PRESSION:  *  No acute traumatic injury.    < end of copied text >  < from: CT Angio Brain Stroke Protocol  w/ IV Cont (23 @ 18:31) >    IMPRESSION:  HEAD CT: No evidence of an acute intracranial hemorhage, midline shift or   hydrocephalus. Mild atrophy with mild white matter ischemic changes. No   significant interval change from exam of earlier in the day  NECK CTA: Mildly degraded by motion. No hemodynamic significant narowing   within the neck.  BRAIN CTA: Mildly degraded by motion. No proximal large vessel occlusion.  CT PERFUSION: Degraded by motion and poor contrast bolus. Small regions   of decreased/delayed perfusion within the anterior aspect of the right   frontal lobe and inferior aspect ofthe left temporal lobe with a volume   of 7 mL. This may be artifactual. Follow-up MRI if symptoms persist.    --- End of Report ---            BENJAMÍN FLANNERY MD; Attending Radiologist    < end of copied text >

## 2023-06-03 NOTE — CONSULT NOTE ADULT - ASSESSMENT
The patient is a 72y Male who is followed by neurology because of Parkinson's disease and acute AMS  Has been of Parkinson's disease meds for many years.  Has signs of advanced Parkinson's disease on exam.  was given atropine drops to take orally and had symptoms about 1-1.5 hours after taking these for first time.    Likely effect of atropine.  Unclear how long duration of side effect may last.  At this point I would not start and Parkinson's disease meds.    I would give tome for atropine effects top clear. May need toxicology consult  When stabilized would consider dopamine agonist or amantadine, may defer medication selection to his outpatient Parkinson's disease specialist    discussed with his wife at the bedside    will follow with you    Edgard Maldonado MD PhD   [id number]

## 2023-06-03 NOTE — CHART NOTE - NSCHARTNOTEFT_GEN_A_CORE
73yo M with PMHx of Parkinson's disease, dementia presented to ED c/o confusion, lethargy and low BP.  RN called to report repeat strait cath retaining 600ml   Per hospitalist H/P Bales to be placed if pt still retaining and unable to void after 6 hours  Bales ordered and Flomax started

## 2023-06-03 NOTE — CHART NOTE - NSCHARTNOTEFT_GEN_A_CORE
per RN family upset regarding plan of care of , states she is awaiting to hear about toxicology r/t atropine side effects.  Pt laying in bed talking nonsense and pulling at the air, lower bilateral leg twitching noted  Per family  there has been no change in his status and she is just frustrated   call placed to neuro to further discuss case   medicine called to determine atropine half life of 2.5 hours per poison control and ok to give benzodiazepines. Atropine was given  by family over 24 hours ago.   Wife updated on plan of care that pt will receive ativan 0.5mg IV for tremors and will be awaiting MRI for further neurological work up.     Vital Signs Last 24 Hrs  T(C): 37.2 (03 Jun 2023 15:32), Max: 37.2 (03 Jun 2023 15:32)  T(F): 98.9 (03 Jun 2023 15:32), Max: 98.9 (03 Jun 2023 15:32)  HR: 97 (03 Jun 2023 15:32) (64 - 97)  BP: 160/89 (03 Jun 2023 15:32) (125/75 - 160/89)  BP(mean): --  RR: 18 (03 Jun 2023 15:32) (18 - 18)  SpO2: 95% (03 Jun 2023 15:32) (95% - 99%)    Parameters below as of 03 Jun 2023 15:32  Patient On (Oxygen Delivery Method): room air          Plan:  Pt requires MRI check list was complected  Pt medicated 1 time 0.5mg ativan IV

## 2023-06-03 NOTE — PROVIDER CONTACT NOTE (OTHER) - SITUATION
Patient's bladder scan showed 466ml of urine
Patient sat up and stated "I need to get out of here!", patient appeared agitated and restless. confusion at baseline remains. bladder scanner= 639ml

## 2023-06-04 LAB
APPEARANCE UR: ABNORMAL
BACTERIA # UR AUTO: ABNORMAL
BILIRUB UR-MCNC: NEGATIVE — SIGNIFICANT CHANGE UP
COLOR SPEC: ABNORMAL
DIFF PNL FLD: ABNORMAL
EPI CELLS # UR: SIGNIFICANT CHANGE UP
GLUCOSE UR QL: NEGATIVE — SIGNIFICANT CHANGE UP
HCT VFR BLD CALC: 41.2 % — SIGNIFICANT CHANGE UP (ref 39–50)
HGB BLD-MCNC: 13.3 G/DL — SIGNIFICANT CHANGE UP (ref 13–17)
KETONES UR-MCNC: ABNORMAL
LEUKOCYTE ESTERASE UR-ACNC: ABNORMAL
MCHC RBC-ENTMCNC: 28.3 PG — SIGNIFICANT CHANGE UP (ref 27–34)
MCHC RBC-ENTMCNC: 32.3 GM/DL — SIGNIFICANT CHANGE UP (ref 32–36)
MCV RBC AUTO: 87.7 FL — SIGNIFICANT CHANGE UP (ref 80–100)
NITRITE UR-MCNC: POSITIVE
PH UR: 8 — SIGNIFICANT CHANGE UP (ref 5–8)
PLATELET # BLD AUTO: 182 K/UL — SIGNIFICANT CHANGE UP (ref 150–400)
PROT UR-MCNC: 100
RBC # BLD: 4.7 M/UL — SIGNIFICANT CHANGE UP (ref 4.2–5.8)
RBC # FLD: 12.9 % — SIGNIFICANT CHANGE UP (ref 10.3–14.5)
RBC CASTS # UR COMP ASSIST: >50 /HPF (ref 0–4)
SP GR SPEC: 1.01 — SIGNIFICANT CHANGE UP (ref 1.01–1.02)
UROBILINOGEN FLD QL: NEGATIVE — SIGNIFICANT CHANGE UP
WBC # BLD: 6.75 K/UL — SIGNIFICANT CHANGE UP (ref 3.8–10.5)
WBC # FLD AUTO: 6.75 K/UL — SIGNIFICANT CHANGE UP (ref 3.8–10.5)
WBC UR QL: SIGNIFICANT CHANGE UP /HPF (ref 0–5)

## 2023-06-04 PROCEDURE — 99233 SBSQ HOSP IP/OBS HIGH 50: CPT

## 2023-06-04 PROCEDURE — 70551 MRI BRAIN STEM W/O DYE: CPT | Mod: 26

## 2023-06-04 RX ORDER — ZINC SULFATE TAB 220 MG (50 MG ZINC EQUIVALENT) 220 (50 ZN) MG
1 TAB ORAL
Qty: 0 | Refills: 0 | DISCHARGE

## 2023-06-04 RX ORDER — CHOLECALCIFEROL (VITAMIN D3) 125 MCG
1 CAPSULE ORAL
Qty: 0 | Refills: 0 | DISCHARGE

## 2023-06-04 RX ORDER — ASPIRIN/CALCIUM CARB/MAGNESIUM 324 MG
1 TABLET ORAL
Qty: 0 | Refills: 0 | DISCHARGE

## 2023-06-04 RX ORDER — ASCORBIC ACID 60 MG
1 TABLET,CHEWABLE ORAL
Qty: 0 | Refills: 0 | DISCHARGE

## 2023-06-04 RX ORDER — CHOLECALCIFEROL (VITAMIN D3) 125 MCG
1000 CAPSULE ORAL DAILY
Refills: 0 | Status: DISCONTINUED | OUTPATIENT
Start: 2023-06-04 | End: 2023-06-05

## 2023-06-04 RX ORDER — LOSARTAN POTASSIUM 100 MG/1
25 TABLET, FILM COATED ORAL DAILY
Refills: 0 | Status: DISCONTINUED | OUTPATIENT
Start: 2023-06-04 | End: 2023-06-05

## 2023-06-04 RX ORDER — ALPRAZOLAM 0.25 MG
0.5 TABLET ORAL ONCE
Refills: 0 | Status: DISCONTINUED | OUTPATIENT
Start: 2023-06-04 | End: 2023-06-04

## 2023-06-04 RX ADMIN — Medication 81 MILLIGRAM(S): at 17:23

## 2023-06-04 RX ADMIN — Medication 0.5 MILLIGRAM(S): at 02:11

## 2023-06-04 RX ADMIN — Medication 3 MILLIGRAM(S): at 23:24

## 2023-06-04 RX ADMIN — Medication 1000 UNIT(S): at 17:23

## 2023-06-04 RX ADMIN — LOSARTAN POTASSIUM 25 MILLIGRAM(S): 100 TABLET, FILM COATED ORAL at 17:23

## 2023-06-04 RX ADMIN — ENOXAPARIN SODIUM 40 MILLIGRAM(S): 100 INJECTION SUBCUTANEOUS at 05:11

## 2023-06-04 NOTE — PROGRESS NOTE ADULT - SUBJECTIVE AND OBJECTIVE BOX
Patient is a 72y old  Male who presents with a chief complaint of Acute encephalopathy (02 Jun 2023 22:40)      Patient seen and examined at bedside this am  he is improving , less tremors in arms  sleeping during me visit , answer open eyes briefly     hagan in place inserted yesterday for retention     Mr result reviewed     Vital Signs Last 24 Hrs  T(C): 36.3 (04 Jun 2023 11:57), Max: 37.2 (03 Jun 2023 15:32)  T(F): 97.4 (04 Jun 2023 11:57), Max: 98.9 (03 Jun 2023 15:32)  HR: 51 (04 Jun 2023 11:57) (51 - 97)  BP: 167/84 (04 Jun 2023 11:57) (146/77 - 167/84)  BP(mean): --  RR: 18 (04 Jun 2023 11:57) (18 - 18)  SpO2: 96% (04 Jun 2023 11:57) (94% - 98%)    Parameters below as of 04 Jun 2023 11:57  Patient On (Oxygen Delivery Method): room air      PHYSICAL EXAM:  General: no distress   Lungs: CTA bilateral  Cardio: RRR, S1/S2, No murmur  Abdomen: Soft, Nontender, Nondistended; Bowel sounds present  Extremities: No calf tenderness, No pitting edema  mmr< from: MR Head No Cont (06.04.23 @ 03:30) >    IMPRESSION:    No evidence of acute infarct, or gross hemorrhage. Evaluation of the   axial flair, T2 weighted and GRE sequences are markedly degraded by   motion artifact, however scattered foci of FLAIR hyperintensity in the   periventricular and subcortical white matter of the bilateral cerebri are   suggested, likely compatible with mild chronic microvascular ischemic   changes.    --- End of Report ---      < end of copied text >                          11.9   6.41  )-----------( 162      ( 03 Jun 2023 02:10 )             37.1   06-03    141  |  105  |  16.2  ----------------------------<  95  4.0   |  25.0  |  0.95    Ca    8.5      03 Jun 2023 02:10  Mg     2.0     06-02    TPro  6.8  /  Alb  3.5  /  TBili  0.5  /  DBili  x   /  AST  14  /  ALT  9   /  AlkPhos  70  06-02         CT Chest w/ IV Cont (06.02.23 @ 18:32) >  ImPRESSION:  *  No acute traumatic injury.    < end of copied text >  < from: CT Angio Brain Stroke Protocol  w/ IV Cont (06.02.23 @ 18:31) >    IMPRESSION:  HEAD CT: No evidence of an acute intracranial hemorhage, midline shift or   hydrocephalus. Mild atrophy with mild white matter ischemic changes. No   significant interval change from exam of earlier in the day  NECK CTA: Mildly degraded by motion. No hemodynamic significant narowing   within the neck.  BRAIN CTA: Mildly degraded by motion. No proximal large vessel occlusion.  CT PERFUSION: Degraded by motion and poor contrast bolus. Small regions   of decreased/delayed perfusion within the anterior aspect of the right   frontal lobe and inferior aspect ofthe left temporal lobe with a volume   of 7 mL. This may be artifactual. Follow-up MRI if symptoms persist.    --- End of Report ---            BENJAMÍN FLANNERY MD; Attending Radiologist    < end of copied text >

## 2023-06-04 NOTE — PROVIDER CONTACT NOTE (OTHER) - RECOMMENDATIONS
Bales catheter and PO flomax to be prescribed
if OK to straight cath or place hagan
Notify NIKUNJ Patiño

## 2023-06-04 NOTE — PROGRESS NOTE ADULT - ASSESSMENT
73yo M with PMHx of Parkinson's disease, dementia presented to ED c/o confusion, lethargy and low BP.    1-Acute encephalopathy  with weakness and speech problems  Ct of head brain neck reviewed   MR negative for stroke   neurology consulted       2-Urinary retention  hagan inserted   cont flomax       3-Parkinson's disease   -Pt currently not taking any meds as per wife   -prescribed rytary by neurologist, wife does not want it given to him at this time   she eill call the neurologist on DC   advise to stop atropin drop     4- High BP not known to have HTN   low on admission   will start low dose losartan   monitor   check orthostatic BP out of bed     d/w wife in details     pending Pt clinical progress Dc home with home care likely in 1-2 days     DVT ppx  -lovenox

## 2023-06-04 NOTE — PROVIDER CONTACT NOTE (OTHER) - ACTION/TREATMENT ORDERED:
NP aware. CBC ordered. Will continue to monitor.
straight cath x1 and see how patient does
Bales catheter inserted and awaiting PO flomax to be prescribed

## 2023-06-04 NOTE — PROGRESS NOTE ADULT - SUBJECTIVE AND OBJECTIVE BOX
[facility]                                     Ne[facility] at [place]                                 Joel Anderson, & Denny                                  [address]                                        [place], [place], [address]                                             [phone]    CC: Parkinson's disease, acute altered mental status  HPI:  The patient is a 72y Male who presented with acute AMS.  His wife is at bedside and provides the following history, he was diagnosed with Parkinson's disease about 18 years ago.  he had been on Sinemet but it made him drowsy and he was stopped about 12 years ago.  he had not been on dopamine replacement medication since.  he tried Nuplazid for hallucinations that did not help.  he saw a new neurologist who gave him atropine for secretions/drooling.   He was also prescribed Rytary, but did not start this medication.  Within 1- 1.5 hours after taking two drops of 1% atropine drops into his mouth he developed severe dry mouth, urinary retention and what appears to be hallucinations and responding to internal stimuli.  He wife also noted drop in blood pressure and called 911 and he arrived at [facility].  At baseline he needs assistance to walk, can talk, but mostly nonsensical speech, and has dementia with some hallucinations, but not like he is now.  Neurology is asked to evaluate.    Interval history: doing better, more alert and speaking with wife not reaching for things/hallucinating    Review of systems (neurology): not speaking clearly with me, but his wife understands him    MEDICATIONS  (STANDING):  aspirin enteric coated 81 milliGRAM(s) Oral daily  cholecalciferol 1000 Unit(s) Oral daily  enoxaparin Injectable 40 milliGRAM(s) SubCutaneous every 24 hours  losartan 25 milliGRAM(s) Oral daily  melatonin 3 milliGRAM(s) Oral at bedtime    MEDICATIONS  (PRN):  acetaminophen     Tablet .. 650 milliGRAM(s) Oral every 6 hours PRN Temp greater or equal to 38C (100.4F), Mild Pain (1 - 3)  aluminum hydroxide/magnesium hydroxide/simethicone Suspension 30 milliLiter(s) Oral every 4 hours PRN Dyspepsia  ondansetron Injectable 4 milliGRAM(s) IV Push every 8 hours PRN Nausea and/or Vomiting      Vital Signs Last 24 Hrs  T(C): 36.3 (04 Jun 2023 11:57), Max: 37.2 (03 Jun 2023 15:32)  T(F): 97.4 (04 Jun 2023 11:57), Max: 98.9 (03 Jun 2023 15:32)  HR: 51 (04 Jun 2023 11:57) (51 - 97)  BP: 167/84 (04 Jun 2023 11:57) (146/77 - 167/84)  BP(mean): --  RR: 18 (04 Jun 2023 11:57) (18 - 18)  SpO2: 96% (04 Jun 2023 11:57) (94% - 98%)    Parameters below as of 04 Jun 2023 11:57  Patient On (Oxygen Delivery Method): room air    Detailed Neurologic Exam:    Mental status: The patient is awake and alert and has diminished attention span.  The patient is not speaking clearly, unable to properly assess orientation or language.  Following simple  commands today    Cranial nerves: Pupils equal and react symmetrically to light. There is no visual field deficit to threat. Extraocular motion is full by inspection There is no ptosis. Facial sensation is intact. Facial musculature is symmetric. Palate elevates symmetrically. Tongue is midline.    Motor: There is normal bulk and increased tone.  There is resting tremor, disappears during sleep.  diffuse, non focal weakness of 4 extremities, today he lifts each extremity to request, legs are weaker than arms    Sensation: Intact to  pinch in 4 extremities    Reflexes: muted throughout and plantar responses are equivocal    Cerebellar: Unable to assess dysmetria on finger to nose testing.    Gait : deferred    LABS:                                    11.9   6.41  )-----------( 162      ( 03 Jun 2023 02:10 )             37.1     06-03    141  |  105  |  16.2  ----------------------------<  95  4.0   |  25.0  |  0.95    Ca    8.5      03 Jun 2023 02:10  Mg     2.0     06-02    TPro  6.8  /  Alb  3.5  /  TBili  0.5  /  DBili  x   /  AST  14  /  ALT  9   /  AlkPhos  70  06-02    LIVER FUNCTIONS - ( 02 Jun 2023 14:39 )  Alb: 3.5 g/dL / Pro: 6.8 g/dL / ALK PHOS: 70 U/L / ALT: 9 U/L / AST: 14 U/L / GGT: x           PT/INR - ( 02 Jun 2023 14:39 )   PT: 13.2 sec;   INR: 1.14 ratio         PTT - ( 02 Jun 2023 14:39 )  PTT:27.9 sec    RADIOLOGY & ADDITIONAL STUDIES (independently reviewed unless otherwise noted):    MR Head No Cont (06.04.23 @ 03:30)   IMPRESSION:  No evidence of acute infarct, or gross hemorrhage. Evaluation of the   axial flair, T2 weighted and GRE sequences are markedly degraded by   motion artifact, however scattered foci of FLAIR hyperintensity in the   periventricular and subcortical white matter of the bilateral cerebri are   suggested, likely compatible with mild chronic microvascular ischemic   changes.      CT Head No Cont, Head/neck CTA, CTP (06.02.23 @ 18:05)    IMPRESSION:  HEAD CT: No evidence of an acute intracranial hemorhage, midline shift or   hydrocephalus. Mild atrophy with mild white matter ischemic changes. No   significant interval change from exam of earlier in the day  NECK CTA: Mildly degraded by motion. No hemodynamic significant narowing   within the neck.  BRAIN CTA: Mildly degraded by motion. No proximal large vessel occlusion.  CT PERFUSION: Degraded by motion and poor contrast bolus. Small regions   of decreased/delayed perfusion within the anterior aspect of the right   frontal lobe and inferior aspect ofthe left temporal lobe with a volume   of 7 mL. This may be artifactual. Follow-up MRI if symptoms persist.

## 2023-06-04 NOTE — PROVIDER CONTACT NOTE (OTHER) - ASSESSMENT
Pt A&Ox2, possibly trying to remove urinary catheter today, causing trauma to the hagan. Pt currently calm, wife at bedside.
Patient sat up and stated "I need to get out of here!", patient appeared agitated and restless. confusion at baseline remains
Patient's bladder scan showed 466ml of urine, remains confused at baseline. straight cath x1 on overnight shift

## 2023-06-04 NOTE — PROVIDER CONTACT NOTE (OTHER) - BACKGROUND
72 y M. Admitted with AMS, hx of parkinson's. Bales cath inserted yesterday for urinary retention, drainage yellow clear urine.
Patient admitted for altered mental status
admitted for AMS, pmh of parkinsons

## 2023-06-04 NOTE — PROGRESS NOTE ADULT - ASSESSMENT
The patient is a 72y Male who is followed by neurology because of Parkinson's disease and acute AMS  Has been of Parkinson's disease meds for many years.  Has signs of advanced Parkinson's disease on exam.  was given atropine drops to take orally and had symptoms about 1-1.5 hours after taking these for first time.    Likely effect of atropine.  He is currently resolving with improved mental status  At this point I would not start and Parkinson's disease meds.    I discussed meds with his wife, he is apparently sensitive to meds and she feels that he will be too lethargic/sedated with meds and does not want to start meds for his Parkinson's disease at this time as she states that they impair his quality of life moreso than they help with his Parkinson's disease symptoms.  Therefore I will not start anything in order to respect her wishes and concerns regarding her husbands care/treatment.  She can further discuss with his Parkinson's disease specialist or me in office if she chooses to follow up in my office    discussed with his wife at the bedside    Thank you for allowing me to participate in the care of your patient    Edgard Maldonado MD, PhD   [id number]

## 2023-06-05 ENCOUNTER — TRANSCRIPTION ENCOUNTER (OUTPATIENT)
Age: 73
End: 2023-06-05

## 2023-06-05 VITALS — DIASTOLIC BLOOD PRESSURE: 74 MMHG | SYSTOLIC BLOOD PRESSURE: 123 MMHG

## 2023-06-05 DIAGNOSIS — R33.9 RETENTION OF URINE, UNSPECIFIED: ICD-10-CM

## 2023-06-05 DIAGNOSIS — R31.9 HEMATURIA, UNSPECIFIED: ICD-10-CM

## 2023-06-05 LAB
APPEARANCE UR: ABNORMAL
BACTERIA # UR AUTO: ABNORMAL
BILIRUB UR-MCNC: NEGATIVE — SIGNIFICANT CHANGE UP
COLOR SPEC: ABNORMAL
DIFF PNL FLD: ABNORMAL
EPI CELLS # UR: SIGNIFICANT CHANGE UP
GLUCOSE UR QL: NEGATIVE — SIGNIFICANT CHANGE UP
KETONES UR-MCNC: ABNORMAL
LEUKOCYTE ESTERASE UR-ACNC: ABNORMAL
NITRITE UR-MCNC: POSITIVE
PH UR: 5 — SIGNIFICANT CHANGE UP (ref 5–8)
PROT UR-MCNC: 100
RBC CASTS # UR COMP ASSIST: >50 /HPF (ref 0–4)
SP GR SPEC: 1.02 — SIGNIFICANT CHANGE UP (ref 1.01–1.02)
UROBILINOGEN FLD QL: 1
WBC UR QL: SIGNIFICANT CHANGE UP /HPF (ref 0–5)

## 2023-06-05 PROCEDURE — 82962 GLUCOSE BLOOD TEST: CPT

## 2023-06-05 PROCEDURE — 86901 BLOOD TYPING SEROLOGIC RH(D): CPT

## 2023-06-05 PROCEDURE — 36415 COLL VENOUS BLD VENIPUNCTURE: CPT

## 2023-06-05 PROCEDURE — 93005 ELECTROCARDIOGRAM TRACING: CPT

## 2023-06-05 PROCEDURE — 70498 CT ANGIOGRAPHY NECK: CPT | Mod: MA

## 2023-06-05 PROCEDURE — 84484 ASSAY OF TROPONIN QUANT: CPT

## 2023-06-05 PROCEDURE — 85025 COMPLETE CBC W/AUTO DIFF WBC: CPT

## 2023-06-05 PROCEDURE — 99239 HOSP IP/OBS DSCHRG MGMT >30: CPT

## 2023-06-05 PROCEDURE — 82947 ASSAY GLUCOSE BLOOD QUANT: CPT

## 2023-06-05 PROCEDURE — 0042T: CPT | Mod: MA

## 2023-06-05 PROCEDURE — 82330 ASSAY OF CALCIUM: CPT

## 2023-06-05 PROCEDURE — 80307 DRUG TEST PRSMV CHEM ANLYZR: CPT

## 2023-06-05 PROCEDURE — 84295 ASSAY OF SERUM SODIUM: CPT

## 2023-06-05 PROCEDURE — 84132 ASSAY OF SERUM POTASSIUM: CPT

## 2023-06-05 PROCEDURE — 84436 ASSAY OF TOTAL THYROXINE: CPT

## 2023-06-05 PROCEDURE — 85027 COMPLETE CBC AUTOMATED: CPT

## 2023-06-05 PROCEDURE — 99232 SBSQ HOSP IP/OBS MODERATE 35: CPT

## 2023-06-05 PROCEDURE — 84443 ASSAY THYROID STIM HORMONE: CPT

## 2023-06-05 PROCEDURE — G1004: CPT

## 2023-06-05 PROCEDURE — 86850 RBC ANTIBODY SCREEN: CPT

## 2023-06-05 PROCEDURE — 99285 EMERGENCY DEPT VISIT HI MDM: CPT | Mod: 25

## 2023-06-05 PROCEDURE — 83735 ASSAY OF MAGNESIUM: CPT

## 2023-06-05 PROCEDURE — 96360 HYDRATION IV INFUSION INIT: CPT

## 2023-06-05 PROCEDURE — 70551 MRI BRAIN STEM W/O DYE: CPT

## 2023-06-05 PROCEDURE — 83605 ASSAY OF LACTIC ACID: CPT

## 2023-06-05 PROCEDURE — 86900 BLOOD TYPING SEROLOGIC ABO: CPT

## 2023-06-05 PROCEDURE — 70496 CT ANGIOGRAPHY HEAD: CPT | Mod: MA

## 2023-06-05 PROCEDURE — 0225U NFCT DS DNA&RNA 21 SARSCOV2: CPT

## 2023-06-05 PROCEDURE — 74177 CT ABD & PELVIS W/CONTRAST: CPT | Mod: MG

## 2023-06-05 PROCEDURE — 71260 CT THORAX DX C+: CPT | Mod: MG

## 2023-06-05 PROCEDURE — 71045 X-RAY EXAM CHEST 1 VIEW: CPT

## 2023-06-05 PROCEDURE — 72125 CT NECK SPINE W/O DYE: CPT | Mod: MG

## 2023-06-05 PROCEDURE — 70450 CT HEAD/BRAIN W/O DYE: CPT | Mod: MG

## 2023-06-05 PROCEDURE — 85018 HEMOGLOBIN: CPT

## 2023-06-05 PROCEDURE — 85610 PROTHROMBIN TIME: CPT

## 2023-06-05 PROCEDURE — 85014 HEMATOCRIT: CPT

## 2023-06-05 PROCEDURE — 82435 ASSAY OF BLOOD CHLORIDE: CPT

## 2023-06-05 PROCEDURE — 87086 URINE CULTURE/COLONY COUNT: CPT

## 2023-06-05 PROCEDURE — 85730 THROMBOPLASTIN TIME PARTIAL: CPT

## 2023-06-05 PROCEDURE — 81001 URINALYSIS AUTO W/SCOPE: CPT

## 2023-06-05 PROCEDURE — 80048 BASIC METABOLIC PNL TOTAL CA: CPT

## 2023-06-05 PROCEDURE — 80053 COMPREHEN METABOLIC PANEL: CPT

## 2023-06-05 PROCEDURE — 82803 BLOOD GASES ANY COMBINATION: CPT

## 2023-06-05 RX ORDER — NITROFURANTOIN MACROCRYSTAL 50 MG
1 CAPSULE ORAL
Qty: 6 | Refills: 0
Start: 2023-06-05 | End: 2023-06-07

## 2023-06-05 RX ORDER — TAMSULOSIN HYDROCHLORIDE 0.4 MG/1
1 CAPSULE ORAL
Qty: 30 | Refills: 0
Start: 2023-06-05 | End: 2023-07-04

## 2023-06-05 RX ORDER — CEFTRIAXONE 500 MG/1
1000 INJECTION, POWDER, FOR SOLUTION INTRAMUSCULAR; INTRAVENOUS EVERY 24 HOURS
Refills: 0 | Status: DISCONTINUED | OUTPATIENT
Start: 2023-06-05 | End: 2023-06-05

## 2023-06-05 RX ORDER — ATROPINE SULFATE 1 %
2 DROPS OPHTHALMIC (EYE)
Refills: 0 | DISCHARGE

## 2023-06-05 RX ORDER — TAMSULOSIN HYDROCHLORIDE 0.4 MG/1
0.4 CAPSULE ORAL AT BEDTIME
Refills: 0 | Status: DISCONTINUED | OUTPATIENT
Start: 2023-06-05 | End: 2023-06-05

## 2023-06-05 RX ORDER — CIPROFLOXACIN LACTATE 400MG/40ML
200 VIAL (ML) INTRAVENOUS ONCE
Refills: 0 | Status: DISCONTINUED | OUTPATIENT
Start: 2023-06-05 | End: 2023-06-05

## 2023-06-05 RX ORDER — SODIUM CHLORIDE 9 MG/ML
500 INJECTION INTRAMUSCULAR; INTRAVENOUS; SUBCUTANEOUS ONCE
Refills: 0 | Status: COMPLETED | OUTPATIENT
Start: 2023-06-05 | End: 2023-06-05

## 2023-06-05 RX ORDER — LANOLIN ALCOHOL/MO/W.PET/CERES
1 CREAM (GRAM) TOPICAL
Qty: 0 | Refills: 0 | DISCHARGE
Start: 2023-06-05

## 2023-06-05 RX ORDER — CIPROFLOXACIN LACTATE 400MG/40ML
250 VIAL (ML) INTRAVENOUS
Refills: 0 | Status: DISCONTINUED | OUTPATIENT
Start: 2023-06-05 | End: 2023-06-05

## 2023-06-05 RX ADMIN — ENOXAPARIN SODIUM 40 MILLIGRAM(S): 100 INJECTION SUBCUTANEOUS at 06:22

## 2023-06-05 RX ADMIN — Medication 1 TABLET(S): at 02:33

## 2023-06-05 RX ADMIN — SODIUM CHLORIDE 166.67 MILLILITER(S): 9 INJECTION INTRAMUSCULAR; INTRAVENOUS; SUBCUTANEOUS at 15:20

## 2023-06-05 RX ADMIN — CEFTRIAXONE 1000 MILLIGRAM(S): 500 INJECTION, POWDER, FOR SOLUTION INTRAMUSCULAR; INTRAVENOUS at 09:45

## 2023-06-05 NOTE — DISCHARGE NOTE PROVIDER - HOSPITAL COURSE
72 year old male with PMHx of Parkinson's disease, dementia, who presented to [facility] ED with c/o confusion, lethargy and low BP after receiving his first dose of atropine drops. In the ED, vitals stable, labs wnl. UA negative. CTH, CTA neck, head, CT chest, a/p were all without acute pathology. Patient was admitted for management of acute encephalopathy. Patient underwent MR head which was negative for acute CVA. Neurology was consulted, symptoms likely caused by atropine drops which were discontinued. Patient's mental status improving. Hagan was changed during hospitalization, and repeat UA positive for UTI. Patient was started on IV ABX. After replacement of hagan, patient developed hematuria and urology was consulted. Patient to follow up outpatient with urology for monitoring. Hematuria secondary to traumatic Hagan placement, resolving, H/H remained stable. Patient's BP noted to be elevated and he was started on losartan. At this time, patient is medically stable for discharge with close outpatient follow up.     - Incomplete - pending PT       72 year old male with PMHx of Parkinson's disease, dementia, who presented to [facility] ED with c/o confusion, lethargy and low BP after receiving his first dose of atropine drops. In the ED, vitals stable, labs wnl. UA negative. CTH, CTA neck, head, CT chest, a/p were all without acute pathology. Patient was admitted for management of acute encephalopathy. Patient underwent MR head which was negative for acute CVA. Neurology was consulted, symptoms likely caused by atropine drops which were discontinued. Patient's mental status improving. Hagan was changed during hospitalization, and repeat UA positive for UTI. Patient was started on IV ABX. After replacement of hagan, patient developed hematuria and urology was consulted. Patient to follow up outpatient with urology for monitoring. Hematuria secondary to traumatic Hagan placement, resolving, H/H remained stable. Patient's BP noted to be elevated and he was started on losartan. At this time, patient is medically stable for discharge with close outpatient follow up. 72 year old male with PMHx of Parkinson's disease, dementia, who presented to [facility] ED with c/o confusion, lethargy and low BP after receiving his first dose of atropine drops. In the ED, vitals stable, labs wnl. UA negative. CTH, CTA neck, head, CT chest, a/p were all without acute pathology. Patient was admitted for management of acute encephalopathy. Patient underwent MR head which was negative for acute CVA. Neurology was consulted, symptoms likely caused by atropine drops which were discontinued. Patient's mental status improving. Hagan was changed during hospitalization, and repeat UA positive for UTI. Patient was started on IV ABX. After replacement of hagan, patient developed hematuria and urology was consulted. Patient to follow up outpatient with urology for monitoring. Hematuria secondary to traumatic Hagan placement, resolving, H/H remained stable. Patient's BP noted to be elevated and he was started on losartan.however did not tolerated noted to have soft low BP out of bed , IVF ordered , willl stop losartan , no meds on DC   Pt's wife agreed to monitor BP at home and follow up with his PCP    At this time, patient is medically stable for discharge with close outpatient follow up.   instructed if hagan is not draining or developed gross hematuria ro come to ED

## 2023-06-05 NOTE — CHART NOTE - NSCHARTNOTEFT_GEN_A_CORE
Repeat UA done with reinsertion of hagan to ensure sterility  Results show +UTI  Urine culture sent   Pt asymptomatic, VSS  Started on Cipro Repeat UA done with reinsertion of hagan to ensure sterility  Results show +UTI  Urine culture sent   Pt asymptomatic, VSS  Will start on bactrim

## 2023-06-05 NOTE — PHYSICAL THERAPY INITIAL EVALUATION ADULT - PERTINENT HX OF CURRENT PROBLEM, REHAB EVAL
as per medical chart: is wife is at bedside and provides the following history, he was diagnosed with Parkinson's disease about 18 years ago.  he had been on Sinemet but it made him drowsy and he was stopped about 12 years ago.  he had not been on dopamine replacement medication since.  he tried Nuplazid for hallucinations that did not help.  he saw a new neurologist who gave him atropine for secretions/drooling.   He was also prescribed Rytary, but did not start this medication.  Within 1- 1.5 hours after taking two drops of 1% atropine drops into his mouth he developed severe dry mouth, urinary retention and what appears to be hallucinations and responding to internal stimuli.  He wife also noted drop in blood pressure and called 911

## 2023-06-05 NOTE — DISCHARGE NOTE PROVIDER - ATTENDING DISCHARGE PHYSICAL EXAMINATION:
I saw pt twice today in am and around lunch   wife is at the bedside , last evening noted to have hematuria in hagan m UA positive , hagan changed   UA and urine cx ordered suspected UTI / cystitis    consulted this am , pt is more awake alert answer , slow responses   wife wants to take him home , MR no stroke   vitals reviewed   BP is better , labs yesterday stable  urine cx is pending , on IV rocephon dose ordered this am for suspected UTI   AMS improving with parkinson dx probably due to atropine drop, and also progressing parkinson dx with new therapy in plan by his neurologist in [facility]   pt's wife is aware to follow up with urology in 1 week possible void trial

## 2023-06-05 NOTE — PHYSICAL THERAPY INITIAL EVALUATION ADULT - IMPAIRMENTS CONTRIBUTING IMPAIRED BED MOBILITY, REHAB EVAL
impaired balance/decreased flexibility/impaired motor control/abnormal muscle tone/narrow base of support/decreased ROM/decreased strength

## 2023-06-05 NOTE — DISCHARGE NOTE PROVIDER - CARE PROVIDER_API CALL
Clayton Pavon  Urology  [address] [place] [address]  Phone: [phone]  Fax: [phone]  Follow Up Time:    Clayton Pavon  Urology  [address] [place] [address]  Phone: [phone]  Fax: [phone]  Follow Up Time:     sunday navarrete  Phone: (   )    -  Fax: (   )    -  Follow Up Time: 1-3 days    Syd Zurita  Neurology  [address], [address]  [place] [address]  Phone: [phone]  Fax: [phone]  Follow Up Time: 1 week

## 2023-06-05 NOTE — PHYSICAL THERAPY INITIAL EVALUATION ADULT - IMPAIRMENTS FOUND, PT EVAL
aerobic capacity/endurance/arousal, attention, and cognition/gait, locomotion, and balance/muscle strength/neuromotor development and sensory integration/ROM

## 2023-06-05 NOTE — DIETITIAN INITIAL EVALUATION ADULT - ORAL INTAKE PTA/DIET HISTORY
Interview conducted with Pts wife at bedside who reports Pt follows and tolerates a regular consistency diet at home, planned to see outpatient SLP. UBW ~214lbs has remained consistent, with good PO intake completing >75% of meals. Pts wife requesting diet upgrade, deferred to MD.

## 2023-06-05 NOTE — CONSULT NOTE ADULT - ASSESSMENT
Patient awake, responsive ; no acute distress, and a poor historian:    Vital Signs Last 24 Hrs  T(C): 36.3 (05 Jun 2023 08:03), Max: 36.7 (04 Jun 2023 23:20)  T(F): 97.4 (05 Jun 2023 08:03), Max: 98.1 (04 Jun 2023 23:20)  HR: 66 (05 Jun 2023 08:03) (51 - 68)  BP: 146/74 (05 Jun 2023 08:03) (107/69 - 167/84)  BP(mean): --  RR: 18 (05 Jun 2023 08:03) (18 - 18)  SpO2: 94% (05 Jun 2023 08:03) (94% - 96%)    Parameters below as of 05 Jun 2023 08:03  Patient On (Oxygen Delivery Method): room air      abdomen: soft n/d, n/t, no supra-pubic pain or tenderness  : hagan catheter in place with hematuria in bag and noted in tubing lighter seems clearing slowly hematuria.  Reported to start following placement hagan catheter for retention.  hagan draining well, irrigated lightly hagan patent no clots appreciated and noted urine in tubing clearing slowly spontaneously.      Labs:                        13.3   6.75  )-----------( 182      ( 04 Jun 2023 20:10 )             41.2       Basic Metabolic Panel (06.03.23 @ 02:10)    Sodium, Serum: 141 mmol/L   Potassium, Serum: 4.0 mmol/L   Chloride, Serum: 105:    Carbon Dioxide, Serum: 25.0 mmol/L   Anion Gap, Serum: 11 mmol/L   Blood Urea Nitrogen, Serum: 16.2 mg/dL   Creatinine, Serum: 0.95 mg/dL   Glucose, Serum: 95 mg/dL      Radiology:  CT Abdomen and Pelvis w/ IV Cont ( 6/2/23 )   KIDNEYS/URETERS: No hematoma, laceration, or perinephric fluid/fluid   collection.    BLADDER: Normal.  REPRODUCTIVE ORGANS: Nonenlarged.            Impression:  Hematuria, acute onset, believe secondary to mild traumatic placement hagan .   As hagan is slowly clearing spontaneously   Discuss with Surgeon present situation and continued care and management   Plan:  Continue present care as per Medicine, no urological intervention at present time disposition with Medicine. If patient to stay will follow peripherally, can be followed up in Office with Dr. Pavon, Urologist. - thank you.   Patient awake, responsive ; no acute distress, and a poor historian:    Vital Signs Last 24 Hrs  T(C): 36.3 (05 Jun 2023 08:03), Max: 36.7 (04 Jun 2023 23:20)  T(F): 97.4 (05 Jun 2023 08:03), Max: 98.1 (04 Jun 2023 23:20)  HR: 66 (05 Jun 2023 08:03) (51 - 68)  BP: 146/74 (05 Jun 2023 08:03) (107/69 - 167/84)  BP(mean): --  RR: 18 (05 Jun 2023 08:03) (18 - 18)  SpO2: 94% (05 Jun 2023 08:03) (94% - 96%)    Parameters below as of 05 Jun 2023 08:03  Patient On (Oxygen Delivery Method): room air      abdomen: soft n/d, n/t, no supra-pubic pain or tenderness  : hagan catheter in place with hematuria in bag and noted in tubing lighter seems clearing slowly hematuria.  Reported to start following placement hagan catheter for retention.  hagan draining well, irrigated lightly hagan patent no clots appreciated and noted urine in tubing clearing slowly spontaneously.      Labs:                        13.3   6.75  )-----------( 182      ( 04 Jun 2023 20:10 )             41.2       Basic Metabolic Panel (06.03.23 @ 02:10)    Sodium, Serum: 141 mmol/L   Potassium, Serum: 4.0 mmol/L   Chloride, Serum: 105:    Carbon Dioxide, Serum: 25.0 mmol/L   Anion Gap, Serum: 11 mmol/L   Blood Urea Nitrogen, Serum: 16.2 mg/dL   Creatinine, Serum: 0.95 mg/dL   Glucose, Serum: 95 mg/dL      Radiology:  CT Abdomen and Pelvis w/ IV Cont ( 6/2/23 )   KIDNEYS/URETERS: No hematoma, laceration, or perinephric fluid/fluid   collection.    BLADDER: Normal.  REPRODUCTIVE ORGANS: Nonenlarged.            Impression:  Hematuria, acute onset, believe secondary to mild traumatic placement hagan .   As hagan is slowly clearing spontaneously   Discuss with Surgeon present situation and continued care and management   Plan:  Continue present care as per Medicine, no urological intervention at present time disposition with Medicine. Patient has a 24 / home help aide , and daughter can also follow hagan, if it gets bright red, clots are noted or if hagan stops draining can return to ER for evaluation. Otherwise, can be followed up in Office with Dr. Pavon, Urologist, for urinary retention and outpatient trial of void. . - thank you.

## 2023-06-05 NOTE — DISCHARGE NOTE PROVIDER - PROVIDER TOKENS
PROVIDER:[TOKEN:[[id number]]] PROVIDER:[TOKEN:[[id number]]],FREE:[LAST:[navarrete],FIRST:[sunday],PHONE:[(   )    -],FAX:[(   )    -],FOLLOWUP:[1-3 days]],PROVIDER:[TOKEN:[[id number]],FOLLOWUP:[1 week]]

## 2023-06-05 NOTE — DISCHARGE NOTE PROVIDER - NSDCCPCAREPLAN_GEN_ALL_CORE_FT
PRINCIPAL DISCHARGE DIAGNOSIS  Diagnosis: Altered mental status  Assessment and Plan of Treatment: - Followed by neurology, Likely secondary to Atropine use. Please discontinue atropine.  - Imaging of head/neck negative for acute pathology.   - Inital UA negative, however repeat UA positive for UTI. Started on antibiotics. Follow up outpatient for montioring.      SECONDARY DISCHARGE DIAGNOSES  Diagnosis: Parkinsons  Assessment and Plan of Treatment: - Declining medication at this time due to side effects. Follow up outpatient with your Parkinsons specialist and neurology.    Diagnosis: Acute UTI  Assessment and Plan of Treatment: - Started on antibioitics. Hagan replaced. Please follow up outpatient with your PCP and urology.    Diagnosis: Hematuria  Assessment and Plan of Treatment: - Followed by urology, secondary to traumatic hagan placement. Follow up outpatient for monitoring.    Diagnosis: HTN (hypertension)  Assessment and Plan of Treatment: - Noted to have elevated blood pressures, and started on blood pressure medication. Follow up with your PCP for monitoring and medication optimization.    Diagnosis: Urinary retention  Assessment and Plan of Treatment: - Hagan changed in setting of UTI.     PRINCIPAL DISCHARGE DIAGNOSIS  Diagnosis: Altered mental status  Assessment and Plan of Treatment: - Followed by neurology, Likely secondary to Atropine use. Please discontinue atropine.  - Imaging of head/neck negative for acute pathology.   - Inital UA negative, however repeat UA positive for UTI. Started on antibiotics. Follow up outpatient for montioring.      SECONDARY DISCHARGE DIAGNOSES  Diagnosis: Parkinsons  Assessment and Plan of Treatment: - Declining medication at this time due to side effects. Follow up outpatient with your Parkinsons specialist and neurology.    Diagnosis: Acute UTI  Assessment and Plan of Treatment: - Started on antibioitics. Hagan replaced. Please follow up outpatient with your PCP and urology.    Diagnosis: Hematuria  Assessment and Plan of Treatment: - Followed by urology, secondary to traumatic hagan placement. Follow up outpatient for monitoring.    Diagnosis: Urinary retention  Assessment and Plan of Treatment: see urologist

## 2023-06-05 NOTE — DISCHARGE NOTE PROVIDER - NSDCHHNEEDSERVICE_GEN_ALL_CORE
Observation and assessment/Teaching and training Observation and assessment/Teaching and training/Other, specify...

## 2023-06-05 NOTE — PHYSICAL THERAPY INITIAL EVALUATION ADULT - IMPAIRED TRANSFERS: SIT/STAND, REHAB EVAL
impaired balance/decreased flexibility/impaired motor control/abnormal muscle tone/narrow base of support/impaired postural control/decreased ROM/decreased strength

## 2023-06-05 NOTE — DISCHARGE NOTE NURSING/CASE MANAGEMENT/SOCIAL WORK - PATIENT PORTAL LINK FT
You can access the FollowMyHealth Patient Portal offered by [facility] by registering at the following website: [email or web address]. By joining [facility]’s FollowMyHealth portal, you will also be able to view your health information using other applications (apps) compatible with our system.

## 2023-06-05 NOTE — DIETITIAN INITIAL EVALUATION ADULT - PERTINENT MEDS FT
MEDICATIONS  (STANDING):  aspirin enteric coated 81 milliGRAM(s) Oral daily  cefTRIAXone Injectable. 1000 milliGRAM(s) IV Push every 24 hours  cholecalciferol 1000 Unit(s) Oral daily  enoxaparin Injectable 40 milliGRAM(s) SubCutaneous every 24 hours  losartan 25 milliGRAM(s) Oral daily  melatonin 3 milliGRAM(s) Oral at bedtime    MEDICATIONS  (PRN):  acetaminophen     Tablet .. 650 milliGRAM(s) Oral every 6 hours PRN Temp greater or equal to 38C (100.4F), Mild Pain (1 - 3)  aluminum hydroxide/magnesium hydroxide/simethicone Suspension 30 milliLiter(s) Oral every 4 hours PRN Dyspepsia  ondansetron Injectable 4 milliGRAM(s) IV Push every 8 hours PRN Nausea and/or Vomiting

## 2023-06-05 NOTE — PHYSICAL THERAPY INITIAL EVALUATION ADULT - ADDITIONAL COMMENTS
as per wife: pt has 24/7 assistance, ambulates with rollator, fell 15 times in the past 3 moths (indoor falls), sleeps in hospital bed, wife available to assist upon D/C home and is requesting for the pt to be D/C home with PT services

## 2023-06-05 NOTE — PHYSICAL THERAPY INITIAL EVALUATION ADULT - IMPAIRMENTS CONTRIBUTING TO GAIT DEVIATIONS, PT EVAL
impaired balance/decreased flexibility/abnormal muscle tone/narrow base of support/decreased ROM/decreased strength

## 2023-06-05 NOTE — DISCHARGE NOTE PROVIDER - NSDCHHNEEDSERVICEOTHER_GEN_ALL_CORE_FT
Hagan cathater , if cath not draining or has red blood gross hematuria send to eemergency dep to be evaluated by urologist pls   18 inch hagan inserted 6/5 1 am , double lumen , ballon size 10

## 2023-06-05 NOTE — PROGRESS NOTE ADULT - SUBJECTIVE AND OBJECTIVE BOX
[facility]                                        Neurology at [place]                                 Joel Anderson, & Denny                                  [address]                                        [place], [place], [address]                                             [phone]        CC: Parkinson's disease and altered mental status     HPI:   The patient is a 72y Male who presented with acute AMS.  His wife is at bedside and provides the following history, he was diagnosed with Parkinson's disease about 18 years ago.  he had been on Sinemet but it made him drowsy and he was stopped about 12 years ago.  he had not been on dopamine replacement medication since.  he tried Nuplazid for hallucinations that did not help.  he saw a new neurologist who gave him atropine for secretions/drooling.   He was also prescribed Rytary, but did not start this medication.  Within 1- 1.5 hours after taking two drops of 1% atropine drops into his mouth he developed severe dry mouth, urinary retention and what appears to be hallucinations and responding to internal stimuli.  He wife also noted drop in blood pressure and called 911 and he arrived at [facility].  At baseline he needs assistance to walk, can talk, but mostly nonsensical speech, and has dementia with some hallucinations, but not like he is now. (AR).     Interim history:  Remains in ER awaiting bed.   Mental status at baseline per wife at bedside.     ROS:   Denies headache or dizziness.  Denies chest pain.  Denies shortness of breath.    MEDICATIONS  (STANDING):  aspirin enteric coated 81 milliGRAM(s) Oral daily  cefTRIAXone Injectable. 1000 milliGRAM(s) IV Push every 24 hours  cholecalciferol 1000 Unit(s) Oral daily  enoxaparin Injectable 40 milliGRAM(s) SubCutaneous every 24 hours  losartan 25 milliGRAM(s) Oral daily  melatonin 3 milliGRAM(s) Oral at bedtime    Vital Signs Last 24 Hrs  T(C): 36.3 (05 Jun 2023 08:03), Max: 36.7 (04 Jun 2023 23:20)  T(F): 97.4 (05 Jun 2023 08:03), Max: 98.1 (04 Jun 2023 23:20)  HR: 66 (05 Jun 2023 08:03) (51 - 68)  BP: 146/74 (05 Jun 2023 08:03) (107/69 - 167/84)  RR: 18 (05 Jun 2023 08:03) (18 - 18)  SpO2: 94% (05 Jun 2023 08:03) (94% - 96%)    Parameters below as of 05 Jun 2023 08:03  Patient On (Oxygen Delivery Method): room air    Detailed Neurologic Exam:    Mental status: The patient is awake and alert. He answers simple questions and follows simple instructions. There is mild dysarthria.     Cranial nerves: Pupils equal and react symmetrically to light. There is no visual field deficit to threat. Extraocular motion is full with no nystagmus. Facial sensation is intact. Facial musculature is symmetric. Palate elevates symmetrically. Tongue is midline.    Motor: There is normal bulk and increased tone with cogwheeling of the wrists and elbows.  There is mild resting tremor.  Strength grossly 5/5 bilaterally.    Sensation: Grossly intact to light touch and pin.    Reflexes: Trace throughout and plantar responses are flexor.    Cerebellar: Cannot be assessed.     Labs:                           13.3   6.75  )-----------( 182      ( 04 Jun 2023 20:10 )             41.2

## 2023-06-05 NOTE — CONSULT NOTE ADULT - SUBJECTIVE AND OBJECTIVE BOX
[facility]                                     Neurology at [place]                                 Joel Anderson, & Denny                                  [address]                                        [place], [place], [address]                                             [phone]    CC: Parkinson's disease, acute altered mental status  HPI:  The patient is a 72y Male who presented with acute AMS.  His wife is at bedside and provides the following history, he was diagnosed with Parkinson's disease about 18 years ago.  he had been on Sinemet but it made him drowsy and he was stopped about 12 years ago.  he had not been on dopamine replacement medication since.  he tried Nuplazid for hallucinations that did not help.  he saw a new neurologist who gave him atropine for secretions/drooling.   He was also prescribed Rytary, but did not start this medication.  Within 1- 1.5 hours after taking two drops of 1% atropine drops into his mouth he developed severe dry mouth, urinary retention and what appears to be hallucinations and responding to internal stimuli.  He wife also noted drop in blood pressure and called 911 and he arrived at [facility].  At baseline he needs assistance to walk, can talk, but mostly nonsensical speech, and has dementia with some hallucinations, but not like he is now.  Neurology is asked to evaluate.    PAST MEDICAL & SURGICAL HISTORY:  Parkinson disease      S/P hernia repair          MEDICATIONS  (STANDING):  aspirin enteric coated 81 milliGRAM(s) Oral daily  enoxaparin Injectable 40 milliGRAM(s) SubCutaneous every 24 hours  melatonin 3 milliGRAM(s) Oral at bedtime    MEDICATIONS  (PRN):  acetaminophen     Tablet .. 650 milliGRAM(s) Oral every 6 hours PRN Temp greater or equal to 38C (100.4F), Mild Pain (1 - 3)  aluminum hydroxide/magnesium hydroxide/simethicone Suspension 30 milliLiter(s) Oral every 4 hours PRN Dyspepsia  ondansetron Injectable 4 milliGRAM(s) IV Push every 8 hours PRN Nausea and/or Vomiting      Allergies    Pen-Vee K (Rash)    Intolerances        SOCIAL HISTORY:  no tob,   no alcohol   no drugs    FAMILY HISTORY:  Family history of Alzheimer's disease (Mother)        ROS: 14 point ROS negative other than what is present in HPI or below    Vital Signs Last 24 Hrs  T(C): 37 (03 Jun 2023 11:23), Max: 37 (03 Jun 2023 11:23)  T(F): 98.6 (03 Jun 2023 11:23), Max: 98.6 (03 Jun 2023 11:23)  HR: 93 (03 Jun 2023 11:23) (64 - 98)  BP: 145/83 (03 Jun 2023 11:23) (125/75 - 158/90)  BP(mean): --  RR: 18 (02 Jun 2023 23:30) (18 - 18)  SpO2: 99% (03 Jun 2023 11:23) (95% - 99%)    Parameters below as of 03 Jun 2023 11:23  Patient On (Oxygen Delivery Method): room air      General: NAD    Detailed Neurologic Exam:    Mental status: The patient is awake and alert and has diminished attention span.  The patient is not speaking unable to asses orientation or language.  Not following commands appears to be reaching for things ? hallucinations .     Cranial nerves: Pupils equal and react symmetrically to light. There is no visual field deficit to threat. Extraocular motion is full by inspection There is no ptosis. Facial sensation is intact. Facial musculature is symmetric. Palate elevates symmetrically. Tongue is midline.    Motor: There is normal bulk and increased tone.  There is resting tremor.  diffuse, non focal weakness of 4 extremities, unable to formally assess due to AMS    Sensation: Intact to  pinch in 4 extremities    Reflexes: muted throughout and plantar responses are equivocal    Cerebellar: There is no dysmetria on finger to nose testing.    Gait : deferred    LABS:                         11.9   6.41  )-----------( 162      ( 03 Jun 2023 02:10 )             37.1       06-03    141  |  105  |  16.2  ----------------------------<  95  4.0   |  25.0  |  0.95    Ca    8.5      03 Jun 2023 02:10  Mg     2.0     06-02    TPro  6.8  /  Alb  3.5  /  TBili  0.5  /  DBili  x   /  AST  14  /  ALT  9   /  AlkPhos  70  06-02      PT/INR - ( 02 Jun 2023 14:39 )   PT: 13.2 sec;   INR: 1.14 ratio         PTT - ( 02 Jun 2023 14:39 )  PTT:27.9 sec    RADIOLOGY & ADDITIONAL STUDIES (independently reviewed unless otherwise noted):  CT Head No Cont, Head/neck CTA, CTP (06.02.23 @ 18:05)    IMPRESSION:  HEAD CT: No evidence of an acute intracranial hemorhage, midline shift or   hydrocephalus. Mild atrophy with mild white matter ischemic changes. No   significant interval change from exam of earlier in the day  NECK CTA: Mildly degraded by motion. No hemodynamic significant narowing   within the neck.  BRAIN CTA: Mildly degraded by motion. No proximal large vessel occlusion.  CT PERFUSION: Degraded by motion and poor contrast bolus. Small regions   of decreased/delayed perfusion within the anterior aspect of the right   frontal lobe and inferior aspect ofthe left temporal lobe with a volume   of 7 mL. This may be artifactual. Follow-up MRI if symptoms persist.    
Called to see patient in ER for evaluation of hematuria following hagan catheter placement.    Patient presented to ER chief complaint:    71yo M with PMHx of Parkinson's disease, dementia presented to ED c/o confusion, lethargy and low BP. Wife reports that at baseline is AAOx4, he speaks very well and walks about half a mile daily with his aide. Pt in usual state of health this morning had breakfast pt had normal breakfast this morning and around 8:30 wife gave him atropine drop for the first time he was prescribed for drooling then around 9am wife noted that pt is eating his fruit cup she gave him with his eyes closed, she went over the check on him pt was not speaking and appears confused. She took the pt outside, check his BP which was in the 80s and his mouth appeared very dry she called ambulance. Wife states that he was prescribed Rytary for Parkinson's after his visit to his new Neurologist Dr. Miller but she has not started him on the medication yet. Wife states that pt did not tolerated Sinemet due to excessive sleep. In the ED vitals stable, labs wnl, UA negative, CTH, CTA neck, head, CT chest, a/p all unremarkable.     Patient seen and examined at bedside:  patient is a poor historian:  Lying in bed in no acute distress.     Allergies: Pen-Vee K    Past history see chart:

## 2023-06-05 NOTE — PROGRESS NOTE ADULT - ASSESSMENT
72y Male who is followed by neurology because of Parkinson's disease and acute altered mental status.    Parkinson's disease   He has failed multiple medications over the years secondary to side effects.   Ambulates with a walker at baseline.     Altered mental status.  Likely effect of Atropine.   Now resolved.     Discharge planning.   Home if PT deems safe.

## 2023-06-05 NOTE — PHYSICAL THERAPY INITIAL EVALUATION ADULT - DIAGNOSIS, PT EVAL
Impaired Functional Mobility due to generalized weakness, increased tone throughout the body, (+) acute encephalopathy.

## 2023-06-05 NOTE — DISCHARGE NOTE PROVIDER - NSDCMRMEDTOKEN_GEN_ALL_CORE_FT
aspirin 81 mg oral tablet: 1 tab(s) orally once a day  Atropisol 1% ophthalmic solution: 2 gram(s) in each affected eye once a day  Flomax 0.4 mg oral capsule: 1 cap(s) orally once a day (at bedtime)  melatonin 3 mg oral tablet: 1 tab(s) orally once a day (at bedtime)  Rytary 23.75 mg-95 mg oral capsule, extended release: 1 cap(s) orally once a day  Vitamin C 100 mg oral tablet: 1 tab(s) orally once a day  Vitamin D3 50,000 intl units oral capsule: 1 cap(s) orally once a week on sat  Zinc 140 mg (as elemental zinc 50 mg) oral tablet: 1 tab(s) orally once a day   aspirin 81 mg oral tablet: 1 tab(s) orally once a day  Atropisol 1% ophthalmic solution: 2 gram(s) in each affected eye once a day  Flomax 0.4 mg oral capsule: 1 cap(s) orally once a day (at bedtime)  Macrodantin 100 mg oral capsule: 1 cap(s) orally 2 times a day start tomorrow 6/6 am  melatonin 3 mg oral tablet: 1 tab(s) orally once a day (at bedtime)  Rytary 23.75 mg-95 mg oral capsule, extended release: 1 cap(s) orally once a day  Vitamin C 100 mg oral tablet: 1 tab(s) orally once a day  Vitamin D3 50,000 intl units oral capsule: 1 cap(s) orally once a week on sat  Zinc 140 mg (as elemental zinc 50 mg) oral tablet: 1 tab(s) orally once a day   aspirin 81 mg oral tablet: 1 tab(s) orally once a day  Flomax 0.4 mg oral capsule: 1 cap(s) orally once a day (at bedtime)  Macrodantin 100 mg oral capsule: 1 cap(s) orally 2 times a day start tomorrow 6/6 am  melatonin 3 mg oral tablet: 1 tab(s) orally once a day (at bedtime)  Rytary 23.75 mg-95 mg oral capsule, extended release: 1 cap(s) orally once a day  Vitamin C 100 mg oral tablet: 1 tab(s) orally once a day  Vitamin D3 50,000 intl units oral capsule: 1 cap(s) orally once a week on sat  Zinc 140 mg (as elemental zinc 50 mg) oral tablet: 1 tab(s) orally once a day

## 2023-06-05 NOTE — DISCHARGE NOTE PROVIDER - NSDCFUSCHEDAPPT_GEN_ALL_CORE_FT
[facility]  CARDIOLOGY [address]  Scheduled Appointment: 08/04/2023    Deanna Fleming  [facility]  CARDIOLOGY [address]  Scheduled Appointment: 08/15/2023

## 2023-06-05 NOTE — DIETITIAN INITIAL EVALUATION ADULT - OTHER INFO
73yo M with PMHx of Parkinson's disease, dementia presented to ED c/o confusion, lethargy and low BP. Wife reports that at baseline is AAOx4, he speaks very well and walks about half a mile daily with his aide. Pt in usual state of health this morning had breakfast pt had normal breakfast this morning and around 8:30 wife gave him atropine drop for the first time he was prescribed for drooling then around 9am wife noted that pt is eating his fruit cup she gave him with his eyes closed, she went over the check on him pt was not speaking and appears confused. She took the pt outside, check his BP which was in the 80s and his mouth appeared very dry she called ambulance. Wife states that he was prescribed Rytary for Parkinson's after his visit to his new Neurologist Dr. Miller but she has not started him on the medication yet. Wife states that pt did not tolerated Sinemet due to excessive sleep.

## 2023-06-06 LAB
CULTURE RESULTS: NO GROWTH — SIGNIFICANT CHANGE UP
SPECIMEN SOURCE: SIGNIFICANT CHANGE UP

## 2023-06-08 ENCOUNTER — APPOINTMENT (OUTPATIENT)
Dept: UROLOGY | Facility: CLINIC | Age: 73
End: 2023-06-08
Payer: MEDICARE

## 2023-06-08 VITALS
HEIGHT: 69 IN | DIASTOLIC BLOOD PRESSURE: 78 MMHG | HEART RATE: 76 BPM | BODY MASS INDEX: 31.7 KG/M2 | SYSTOLIC BLOOD PRESSURE: 126 MMHG | WEIGHT: 214 LBS

## 2023-06-08 PROCEDURE — 99203 OFFICE O/P NEW LOW 30 MIN: CPT

## 2023-06-08 NOTE — HISTORY OF PRESENT ILLNESS
[FreeTextEntry1] : The patient was in the hospital for gross hematuria and retention.  he does not tolerate parkinson's meds. He did not have any significant voiding issues before going to the hospital.  no urgency no frequency previously.

## 2023-06-08 NOTE — ASSESSMENT
[FreeTextEntry1] : Impression:\par \par urinary retention. \par They did not want to try tamsulosin\par \par he is too tired to attempt a voiding trial today. \par \par followup next week for voiding trial.

## 2023-06-08 NOTE — PHYSICAL EXAM
[General Appearance - Well Developed] : well developed [General Appearance - Well Nourished] : well nourished [Normal Appearance] : normal appearance [Well Groomed] : well groomed [General Appearance - In No Acute Distress] : no acute distress [FreeTextEntry1] : somewhat tired [Edema] : no peripheral edema [Respiration, Rhythm And Depth] : normal respiratory rhythm and effort [Exaggerated Use Of Accessory Muscles For Inspiration] : no accessory muscle use [Abdomen Soft] : soft [Abdomen Tenderness] : non-tender [Costovertebral Angle Tenderness] : no ~M costovertebral angle tenderness [Normal Station and Gait] : the gait and station were normal for the patient's age [] : no rash [No Focal Deficits] : no focal deficits [Oriented To Time, Place, And Person] : oriented to person, place, and time [Affect] : the affect was normal [Mood] : the mood was normal [Not Anxious] : not anxious

## 2023-06-12 ENCOUNTER — APPOINTMENT (OUTPATIENT)
Dept: NEUROLOGY | Facility: CLINIC | Age: 73
End: 2023-06-12
Payer: MEDICARE

## 2023-06-12 VITALS
HEIGHT: 69 IN | WEIGHT: 215 LBS | SYSTOLIC BLOOD PRESSURE: 120 MMHG | DIASTOLIC BLOOD PRESSURE: 68 MMHG | BODY MASS INDEX: 31.84 KG/M2

## 2023-06-12 PROCEDURE — 99213 OFFICE O/P EST LOW 20 MIN: CPT

## 2023-06-12 NOTE — CONSULT LETTER
[Dear  ___] : Dear  [unfilled], [Courtesy Letter:] : I had the pleasure of seeing your patient, [unfilled], in my office today. [Please see my note below.] : Please see my note below. [Consult Closing:] : Thank you very much for allowing me to participate in the care of this patient.  If you have any questions, please do not hesitate to contact me. [Sincerely,] : Sincerely, [FreeTextEntry3] : Edgard Maldonado M.D., Ph.D. DPN-N\par [facility]\par Neurology at [place]\par Medical Director of Stroke Services\par [facility]\par

## 2023-06-12 NOTE — REASON FOR VISIT
[Post Hospitalization] : a post hospitalization visit [Other: _____] : [unfilled] [FreeTextEntry1] : Parkinson's disease

## 2023-06-12 NOTE — REVIEW OF SYSTEMS
[As Noted in HPI] : as noted in HPI [Leg Weakness] : leg weakness [Difficulty Walking] : difficulty walking [Negative] : Heme/Lymph [de-identified] : Tremor

## 2023-06-12 NOTE — ASSESSMENT
[FreeTextEntry1] : This is a 72-year-old man with idiopathic Parkinson's disease.  Due to multiple side effects with different medications his wife prefers that he not be on any.  He will continue physical therapy and Occupational Therapy.  I will see him back in 6 months, sooner should the need arise.

## 2023-06-12 NOTE — PHYSICAL EXAM
[Person] : oriented to person [Place] : oriented to place [Time] : disoriented to time [Remote Intact] : remote memory intact [Registration Intact] : recent registration memory intact [Span Intact] : the attention span was normal [Concentration Intact] : normal concentrating ability [Visual Intact] : visual attention was ~T not ~L decreased [Naming Objects] : no difficulty naming common objects [Repeating Phrases] : no difficulty repeating a phrase [Fluency] : fluency intact [Comprehension] : comprehension intact [Past History] : adequate knowledge of personal past history [Cranial Nerves Optic (II)] : visual acuity intact bilaterally,  visual fields full to confrontation, pupils equal round and reactive to light [Cranial Nerves Oculomotor (III)] : extraocular motion intact [Cranial Nerves Trigeminal (V)] : facial sensation intact symmetrically [Cranial Nerves Facial (VII)] : face symmetrical [Cranial Nerves Vestibulocochlear (VIII)] : hearing was intact bilaterally [Cranial Nerves Glossopharyngeal (IX)] : tongue and palate midline [Cranial Nerves Hypoglossal (XII)] : there was no tongue deviation with protrusion [Cranial Nerves Accessory (XI - Cranial And Spinal)] : head turning and shoulder shrug symmetric [No Muscle Atrophy] : normal bulk in all four extremities [Paresis Pronator Drift Right-Sided] : no pronator drift on the right [Paresis Pronator Drift Left-Sided] : no pronator drift on the left [Motor Strength Upper Extremities Bilaterally] : strength was normal in both upper extremities [Motor Strength Lower Extremities Bilaterally] : strength was normal in both lower extremities [Sensation Tactile Decrease] : light touch was intact [Sensation Pain / Temperature Decrease] : pain and temperature was intact [Sensation Vibration Decrease] : vibration was intact [Proprioception] : proprioception was intact [Tremor] : a tremor present [Coordination - Dysmetria Impaired Finger-to-Nose Bilateral] : not present [1+] : Patella left 1+ [FreeTextEntry6] : Mild diffuse nonfocal weakness, increased tone bilateral with mild cogwheeling [FreeTextEntry8] : Uses a rolling walker, slow shuffling steps [Sclera] : the sclera and conjunctiva were normal [PERRL With Normal Accommodation] : pupils were equal in size, round, reactive to light, with normal accommodation [Extraocular Movements] : extraocular movements were intact [No APD] : no afferent pupillary defect [No SAMUEL] : no internuclear ophthalmoplegia [Full Visual Field] : full visual field

## 2023-06-12 NOTE — HISTORY OF PRESENT ILLNESS
[FreeTextEntry1] : Posthospitalization visit June 12, 2023:\par This is a 72-year-old man with idiopathic Parkinson disease who was seen in the hospital last week.  He has a long history of Parkinson disease and is not on any medication.  He had originally been on Sinemet and had side effects and was not started on anything else.  More recently he was given Nuplazid for hallucination and had side effects and stopped that.  He was seen last week in the hospital with altered mental status and hallucinations this was likely due to to a new prescription of atropine that was given to them to control secretions and drooling.  He is back to his baseline now that the atropine is out of his system.  At this point his wife prefers for him not to take any medications because he seems to do worse on them then off of them.  He is here today for neurological follow-up

## 2023-06-13 ENCOUNTER — APPOINTMENT (OUTPATIENT)
Dept: UROLOGY | Facility: CLINIC | Age: 73
End: 2023-06-13
Payer: MEDICARE

## 2023-06-13 VITALS — DIASTOLIC BLOOD PRESSURE: 68 MMHG | SYSTOLIC BLOOD PRESSURE: 125 MMHG | HEART RATE: 74 BPM

## 2023-06-13 PROCEDURE — A4216: CPT | Mod: NC

## 2023-06-13 PROCEDURE — 51700 IRRIGATION OF BLADDER: CPT

## 2023-06-28 NOTE — PATIENT PROFILE ADULT - ARE SIGNIFICANT INDICATORS COMPLETE.
Electrodesiccation Text: The wound bed was treated with electrodesiccation after the biopsy was performed. No Yes

## 2023-07-18 ENCOUNTER — APPOINTMENT (OUTPATIENT)
Dept: UROLOGY | Facility: CLINIC | Age: 73
End: 2023-07-18
Payer: MEDICARE

## 2023-07-18 VITALS — HEART RATE: 79 BPM | DIASTOLIC BLOOD PRESSURE: 76 MMHG | SYSTOLIC BLOOD PRESSURE: 130 MMHG

## 2023-07-18 LAB
BILIRUB UR QL STRIP: NORMAL
CLARITY UR: NORMAL
COLLECTION METHOD: NORMAL
GLUCOSE UR-MCNC: NORMAL
HCG UR QL: 0.2 EU/DL
HGB UR QL STRIP.AUTO: NORMAL
KETONES UR-MCNC: NORMAL
LEUKOCYTE ESTERASE UR QL STRIP: NORMAL
NITRITE UR QL STRIP: NORMAL
PH UR STRIP: 5
PROT UR STRIP-MCNC: NORMAL
SP GR UR STRIP: 1.02

## 2023-07-18 PROCEDURE — 81002 URINALYSIS NONAUTO W/O SCOPE: CPT

## 2023-07-18 PROCEDURE — 99212 OFFICE O/P EST SF 10 MIN: CPT

## 2023-07-18 NOTE — HISTORY OF PRESENT ILLNESS
[FreeTextEntry1] : patient has been voiding well. no dysuria or hematuria. no urgency.\par \par has to be encouraged to consume more fluids.

## 2023-07-18 NOTE — LETTER BODY
[Dear  ___] : Dear  [unfilled], [Courtesy Letter:] : I had the pleasure of seeing your patient, [unfilled], in my office today. [Please see my note below.] : Please see my note below. [Sincerely,] : Sincerely, [FreeTextEntry3] : Ed\par \par Clayton Pavon MD\par [facility]\par  of Urology\par [employer] [facility]\par

## 2023-07-18 NOTE — PHYSICAL EXAM
[General Appearance - Well Developed] : well developed [General Appearance - Well Nourished] : well nourished [Normal Appearance] : normal appearance [Well Groomed] : well groomed [General Appearance - In No Acute Distress] : no acute distress [Edema] : no peripheral edema [] : no respiratory distress [Respiration, Rhythm And Depth] : normal respiratory rhythm and effort [Exaggerated Use Of Accessory Muscles For Inspiration] : no accessory muscle use [Oriented To Time, Place, And Person] : oriented to person, place, and time [Affect] : the affect was normal [Mood] : the mood was normal [Not Anxious] : not anxious [FreeTextEntry1] : tremor, stiff

## 2023-08-04 ENCOUNTER — APPOINTMENT (OUTPATIENT)
Dept: CARDIOLOGY | Facility: CLINIC | Age: 73
End: 2023-08-04
Payer: MEDICARE

## 2023-08-04 PROCEDURE — 93306 TTE W/DOPPLER COMPLETE: CPT

## 2023-09-14 ENCOUNTER — APPOINTMENT (OUTPATIENT)
Dept: CARDIOLOGY | Facility: CLINIC | Age: 73
End: 2023-09-14
Payer: MEDICARE

## 2023-09-14 VITALS
DIASTOLIC BLOOD PRESSURE: 72 MMHG | SYSTOLIC BLOOD PRESSURE: 116 MMHG | HEIGHT: 69 IN | WEIGHT: 214 LBS | TEMPERATURE: 98.2 F | HEART RATE: 73 BPM | BODY MASS INDEX: 31.7 KG/M2 | OXYGEN SATURATION: 96 %

## 2023-09-14 DIAGNOSIS — I07.9 RHEUMATIC TRICUSPID VALVE DISEASE, UNSPECIFIED: ICD-10-CM

## 2023-09-14 DIAGNOSIS — R60.9 EDEMA, UNSPECIFIED: ICD-10-CM

## 2023-09-14 PROCEDURE — 99215 OFFICE O/P EST HI 40 MIN: CPT | Mod: 25

## 2023-09-14 PROCEDURE — 93000 ELECTROCARDIOGRAM COMPLETE: CPT

## 2023-09-14 RX ORDER — TAMSULOSIN HYDROCHLORIDE 0.4 MG/1
0.4 CAPSULE ORAL AT BEDTIME
Refills: 0 | Status: ACTIVE | COMMUNITY

## 2023-09-27 ENCOUNTER — Encounter (OUTPATIENT)
Dept: URBAN - METROPOLITAN AREA CLINIC 40 | Facility: CLINIC | Age: 73
Setting detail: OPHTHALMOLOGY
End: 2023-09-27
Payer: COMMERCIAL

## 2023-10-17 ENCOUNTER — NON-APPOINTMENT (OUTPATIENT)
Age: 73
End: 2023-10-17

## 2023-11-18 ENCOUNTER — OFFICE (OUTPATIENT)
Dept: URBAN - METROPOLITAN AREA CLINIC 112 | Facility: CLINIC | Age: 73
Setting detail: OPHTHALMOLOGY
End: 2023-11-18
Payer: COMMERCIAL

## 2023-11-18 DIAGNOSIS — H16.222: ICD-10-CM

## 2023-11-18 DIAGNOSIS — H16.223: ICD-10-CM

## 2023-11-18 DIAGNOSIS — H25.13: ICD-10-CM

## 2023-11-18 PROBLEM — H16.221 DRY EYE SYNDROME K SICCA; RIGHT EYE, LEFT EYE, BOTH EYES: Status: ACTIVE | Noted: 2023-11-18

## 2023-11-18 PROCEDURE — 83861 MICROFLUID ANALY TEARS: CPT | Mod: QW,LT | Performed by: OPHTHALMOLOGY

## 2023-11-18 PROCEDURE — 99213 OFFICE O/P EST LOW 20 MIN: CPT | Performed by: OPHTHALMOLOGY

## 2023-11-18 PROCEDURE — 83861 MICROFLUID ANALY TEARS: CPT | Performed by: OPHTHALMOLOGY

## 2023-11-18 ASSESSMENT — REFRACTION_MANIFEST
OS_ADD: +2.50
OS_VA1: 20/30
OS_CYLINDER: -0.75
OS_AXIS: 080
OS_AXIS: 080
OD_CYLINDER: SPH
OD_CYLINDER: SPH
OS_CYLINDER: -0.75
OS_ADD: +2.50
OS_SPHERE: +1.75
OD_VA1: 20/40
OD_ADD: +2.50
OS_SPHERE: +2.00
OD_SPHERE: +1.50
OD_VA1: 20/25
OD_SPHERE: +1.50
OD_ADD: +2.50
OS_VA1: 20/40

## 2023-11-18 ASSESSMENT — SPHEQUIV_DERIVED
OD_SPHEQUIV: 1.125
OS_SPHEQUIV: 1.375
OS_SPHEQUIV: 1.625
OS_SPHEQUIV: 1.75

## 2023-11-18 ASSESSMENT — REFRACTION_CURRENTRX
OD_SPHERE: +3.75
OD_SPHERE: +1.50
OS_CYLINDER: -0.25
OS_AXIS: 100
OS_CYLINDER: -0.25
OD_AXIS: 110
OS_SPHERE: +3.50
OD_CYLINDER: -0.25
OS_SPHERE: +1.50
OD_OVR_VA: 20/
OD_VPRISM_DIRECTION: SV
OS_VPRISM_DIRECTION: SV
OD_CYLINDER: -0.50
OS_AXIS: 105
OS_OVR_VA: 20/
OD_OVR_VA: 20/
OS_OVR_VA: 20/
OD_AXIS: 100

## 2023-11-18 ASSESSMENT — CONFRONTATIONAL VISUAL FIELD TEST (CVF)
OS_FINDINGS: FULL
OD_FINDINGS: FULL

## 2023-11-18 ASSESSMENT — REFRACTION_AUTOREFRACTION
OD_AXIS: 094
OS_AXIS: 092
OD_SPHERE: +1.50
OS_CYLINDER: -1.50
OS_SPHERE: +2.50
OD_CYLINDER: -0.75

## 2023-11-18 ASSESSMENT — SUPERFICIAL PUNCTATE KERATITIS (SPK)
OS_SPK: T
OD_SPK: T

## 2023-11-28 NOTE — DIETITIAN INITIAL EVALUATION ADULT. - +GENDER
Statement Selected
No difficulties
Crescentic Intermediate Repair Preamble Text (Leave Blank If You Do Not Want): Undermining was performed with blunt dissection.

## 2023-12-12 NOTE — ED ADULT NURSE NOTE - IN THE PAST 12 MONTHS HAVE YOU USED DRUGS OTHER THAN THOSE REQUIRED FOR MEDICAL REASON?
No Detail Level: Detailed Depth Of Biopsy: dermis Was A Bandage Applied: Yes Size Of Lesion In Cm: 0 Biopsy Type: H and E Biopsy Method: Dermablade Anesthesia Type: 0.5% lidocaine with 1:100,000 epinephrine and a 1:10 solution of 8.4% sodium bicarbonate Anesthesia Volume In Cc (Will Not Render If 0): 0.5 Hemostasis: Aluminum Chloride Wound Care: Vaseline Dressing: bandage Destruction After The Procedure: No Type Of Destruction Used: Curettage Curettage Text: The wound bed was treated with curettage after the biopsy was performed. Cryotherapy Text: The wound bed was treated with cryotherapy after the biopsy was performed. Electrodesiccation Text: The wound bed was treated with electrodesiccation after the biopsy was performed. Electrodesiccation And Curettage Text: The wound bed was treated with electrodesiccation and curettage after the biopsy was performed. Silver Nitrate Text: The wound bed was treated with silver nitrate after the biopsy was performed. Lab: [id number] Lab Facility: [id number] Consent: Written consent was obtained and risks were reviewed including but not limited to scarring, infection and bleeding Post-Care Instructions: Patient was given post-surgical/biopsy wound care instructions. Notification Instructions: Patient will be notified of biopsy results. However, patient instructed to call the office if not contacted within 2 weeks. Billing Type: Third-Party Bill Information: Selecting Yes will display possible errors in your note based on the variables you have selected. This validation is only offered as a suggestion for you. PLEASE NOTE THAT THE VALIDATION TEXT WILL BE REMOVED WHEN YOU FINALIZE YOUR NOTE. IF YOU WANT TO FAX A PRELIMINARY NOTE YOU WILL NEED TO TOGGLE THIS TO 'NO' IF YOU DO NOT WANT IT IN YOUR FAXED NOTE.

## 2023-12-15 ENCOUNTER — APPOINTMENT (OUTPATIENT)
Dept: NEUROLOGY | Facility: CLINIC | Age: 73
End: 2023-12-15
Payer: MEDICARE

## 2023-12-15 VITALS
BODY MASS INDEX: 31.7 KG/M2 | SYSTOLIC BLOOD PRESSURE: 128 MMHG | HEIGHT: 69 IN | WEIGHT: 214 LBS | DIASTOLIC BLOOD PRESSURE: 70 MMHG

## 2023-12-15 DIAGNOSIS — G20.A1 PARKINSON'S DISEASE WITHOUT DYSKINESIA, WITHOUT MENTION OF FLUCTUATIONS: ICD-10-CM

## 2023-12-15 PROCEDURE — 99213 OFFICE O/P EST LOW 20 MIN: CPT

## 2023-12-15 RX ORDER — FLUTICASONE PROPIONATE 50 UG/1
SPRAY, METERED NASAL
Refills: 0 | Status: ACTIVE | COMMUNITY

## 2023-12-15 RX ORDER — OMEPRAZOLE 20 MG/1
TABLET, DELAYED RELEASE ORAL
Refills: 0 | Status: ACTIVE | COMMUNITY

## 2023-12-15 NOTE — HISTORY OF PRESENT ILLNESS
[FreeTextEntry1] : Posthospitalization visit June 12, 2023: This is a 72-year-old man with idiopathic Parkinson disease who was seen in the hospital last week.  He has a long history of Parkinson disease and is not on any medication.  He had originally been on Sinemet and had side effects and was not started on anything else.  More recently he was given Nuplazid for hallucination and had side effects and stopped that.  He was seen last week in the hospital with altered mental status and hallucinations this was likely due to to a new prescription of atropine that was given to them to control secretions and drooling.  He is back to his baseline now that the atropine is out of his system.  At this point his wife prefers for him not to take any medications because he seems to do worse on them then off of them.  He is here today for neurological follow-up  Follow-up December 15, 2023: This is a 73-year-old man with idiopathic Parkinson's disease.  He has a longstanding history of Parkinson's disease.  When he takes medications for Parkinson's disease it makes him severely fatigued and tired.  He is already sleeping 12 to 12-1/2 hours a day.  His wife is concerned that if he starts medicine for Parkinson's disease he will sleep even longer which is not optimal.  There goal is for him to be awake during the day and be awake enough and alert enough to feed himself and to take walks when wanted.  He is overall largely unchanged from his last visit.  He did have some episodes of altered mental status and sounded like he was a bit belligerent earlier this week.  This is cleared up today.  He is here today for neurologic follow-up.

## 2023-12-15 NOTE — ASSESSMENT
[FreeTextEntry1] : This is a 73-year-old man with idiopathic Parkinson disease.  He is fairly advanced.  Due to side effects he prefers not to take medication.  His wife did request a prescription for massage therapy and now provided.  They plan to restart physical occupational and speech therapy in the spring and I have told the wife to call me when she needs new prescription.  I will see him back in 6 months, sooner should the need arise.

## 2023-12-15 NOTE — PHYSICAL EXAM
[Person] : oriented to person [Place] : oriented to place [Time] : disoriented to time [Remote Intact] : remote memory intact [Registration Intact] : recent registration memory intact [Span Intact] : the attention span was normal [Concentration Intact] : normal concentrating ability [Visual Intact] : visual attention was ~T not ~L decreased [Naming Objects] : no difficulty naming common objects [Repeating Phrases] : no difficulty repeating a phrase [Fluency] : fluency intact [Comprehension] : comprehension intact [Past History] : adequate knowledge of personal past history [Cranial Nerves Optic (II)] : visual acuity intact bilaterally,  visual fields full to confrontation, pupils equal round and reactive to light [Cranial Nerves Trigeminal (V)] : facial sensation intact symmetrically [Cranial Nerves Facial (VII)] : face symmetrical [Cranial Nerves Vestibulocochlear (VIII)] : hearing was intact bilaterally [Cranial Nerves Glossopharyngeal (IX)] : tongue and palate midline [Cranial Nerves Accessory (XI - Cranial And Spinal)] : head turning and shoulder shrug symmetric [Cranial Nerves Hypoglossal (XII)] : there was no tongue deviation with protrusion [Cranial Nerves Oculomotor - Right Only] : 3rd cranial nerve palsy [No Muscle Atrophy] : normal bulk in all four extremities [Paresis Pronator Drift Right-Sided] : no pronator drift on the right [Paresis Pronator Drift Left-Sided] : no pronator drift on the left [Motor Strength Upper Extremities Bilaterally] : strength was normal in both upper extremities [Motor Strength Lower Extremities Bilaterally] : strength was normal in both lower extremities [Sensation Tactile Decrease] : light touch was intact [Sensation Pain / Temperature Decrease] : pain and temperature was intact [Sensation Vibration Decrease] : vibration was intact [Proprioception] : proprioception was intact [Tremor] : a tremor present [Coordination - Dysmetria Impaired Finger-to-Nose Bilateral] : not present [1+] : Patella left 1+ [FreeTextEntry6] : Mild diffuse nonfocal weakness, increased tone bilateral with cogwheeling [FreeTextEntry8] : Uses a rolling walker, slow shuffling steps [Sclera] : the sclera and conjunctiva were normal [PERRL With Normal Accommodation] : pupils were equal in size, round, reactive to light, with normal accommodation [No APD] : no afferent pupillary defect [No SAMUEL] : no internuclear ophthalmoplegia [Full Visual Field] : full visual field [FreeTextEntry1] : 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

## 2023-12-15 NOTE — REVIEW OF SYSTEMS
[As Noted in HPI] : as noted in HPI [Confused or Disoriented] : confusion [Difficulty Walking] : difficulty walking [Negative] : Heme/Lymph [de-identified] : Hypophonia, tremor, increased tone

## 2023-12-15 NOTE — CONSULT LETTER
[Dear  ___] : Dear  [unfilled], [Courtesy Letter:] : I had the pleasure of seeing your patient, [unfilled], in my office today. [Please see my note below.] : Please see my note below. [Consult Closing:] : Thank you very much for allowing me to participate in the care of this patient.  If you have any questions, please do not hesitate to contact me. [Sincerely,] : Sincerely, [FreeTextEntry3] : Edgard Maldonado M.D., Ph.D. DPN-N [facility] at [place] Director, Comprehensive Stroke Center [facility]

## 2024-03-16 ENCOUNTER — OFFICE (OUTPATIENT)
Dept: URBAN - METROPOLITAN AREA CLINIC 112 | Facility: CLINIC | Age: 74
Setting detail: OPHTHALMOLOGY
End: 2024-03-16
Payer: MEDICARE

## 2024-03-16 DIAGNOSIS — H43.391: ICD-10-CM

## 2024-03-16 DIAGNOSIS — H16.221: ICD-10-CM

## 2024-03-16 DIAGNOSIS — H25.13: ICD-10-CM

## 2024-03-16 DIAGNOSIS — H16.223: ICD-10-CM

## 2024-03-16 DIAGNOSIS — H16.222: ICD-10-CM

## 2024-03-16 PROCEDURE — 92250 FUNDUS PHOTOGRAPHY W/I&R: CPT | Performed by: OPHTHALMOLOGY

## 2024-03-16 PROCEDURE — 92014 COMPRE OPH EXAM EST PT 1/>: CPT | Performed by: OPHTHALMOLOGY

## 2024-03-16 ASSESSMENT — REFRACTION_MANIFEST
OD_ADD: +2.50
OS_ADD: +2.50
OD_SPHERE: +1.50
OD_VA1: 20/40
OS_AXIS: 080
OS_VA1: 20/30
OD_SPHERE: +1.50
OD_VA1: 20/25
OS_SPHERE: +1.75
OS_AXIS: 080
OD_CYLINDER: SPH
OS_CYLINDER: -0.75
OS_VA1: 20/40
OD_CYLINDER: SPH
OS_ADD: +2.50
OD_ADD: +2.50
OS_CYLINDER: -0.75
OS_SPHERE: +2.00

## 2024-03-16 ASSESSMENT — REFRACTION_CURRENTRX
OS_CYLINDER: -0.25
OS_OVR_VA: 20/
OD_VPRISM_DIRECTION: SV
OD_OVR_VA: 20/
OD_OVR_VA: 20/
OD_SPHERE: +3.75
OS_CYLINDER: -0.25
OS_VPRISM_DIRECTION: SV
OS_AXIS: 105
OD_AXIS: 110
OD_AXIS: 100
OD_CYLINDER: -0.50
OS_OVR_VA: 20/
OS_SPHERE: +1.50
OD_SPHERE: +1.50
OS_SPHERE: +3.50
OD_CYLINDER: -0.25
OS_AXIS: 100

## 2024-03-16 ASSESSMENT — SPHEQUIV_DERIVED
OS_SPHEQUIV: 1.375
OS_SPHEQUIV: 1.625

## 2024-04-02 NOTE — ED ADULT NURSE REASSESSMENT NOTE - NS ED NURSE REASSESS COMMENT FT1
HEARING AID FITTING    Audiology  Hearing Aid    :  HipClub Model/style/color: Real 1 miniRITE R/chroma beige  Date of purchase/dispense date: 4/2/2024   REPAIR WARRANTY EXPIRATION DATE: 3/16/2027   Loss and Damage expiration date: 3/16/2027  [employer] expiration date: 4/2/2025  Serial # right: [id number]  //  Serial # left: [id number]  Payor: [facility]    Battery size: Internal Lithium Ion  Dome: Right: 8 mm double bass dome  //  Left: 8 mm power dome  Supplies/wax filters: [employer]zer minifit wax filters  /retention loop: #3 85 dB  right/ #3 100 dB  left    Accessory: HipClub Desktop   ---Accessory serial # [id number]  ---Accessory warranty expiration date: 3/16/2027   used: Stockpile    Updated by BIANCA Lindsay on 2/13/2024    PRE-FITTING CHECKLIST:  Hearing evaluation performed within past 6 months  Medical clearance/waiver obtained-Dr. Ronn Jaramillo  [facility]    FITTING CHECKLIST:  Patient shown parts of hearing aid(s):  Toggle switch  Review of battery charging and safety. Battery size: Lithium Ion.  Assistance needed: No  Insertion and removal of hearing aid. Assistance needed: No  Cleaning/maintenance/troubleshooting of hearing aid. Assistance needed: No  Wax guard system: Yes Assistance needed: No  Reviewed hearing aid instruction booklet, troubleshooting.  Reviewed telephone use.   Paired to cell phone: Yes. Cell phone rosemary installed: Yes. HipClub  rosemary Audio streaming enabled: Yes.  Accessories provided:   Validation/verification performed: Obtained speech mapping via verifit audioscan and adjusted to best meet targets for moderate sounds.    PROGRAMMING:  Software used:Cerevo   used:Stockpile  Initial settings: Set to adaptation level 3 and adjusted to best meet targets. Decreased overall gain in the left ear for a better balance between ears per patient  received pt at this time from ongoing shift. pt at baseline mental status, family at bedside. pt denies any pain/discomfort or sob at this time. afebrile, +3 BLE edema, no wheeping, + redness, sensation intact, denies numbness/tingling. cap refil < 3. pending doppler. pt updated on plan of care, verbalizes understanding. call bell in reach request.  Program destinations:   1: General   2. 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 Discussed care and maintenance, insertion/removal, and charging. The patient was able to do these things without assistance. The hearing aids were paired with the phone for both use of the rosemary and for streaming. Instructed Mr. Kay on use of the rosemary and a phone call was demonstrated in office. The hearing aid bill of sale was reviewed and signed in office today.      FOLLOW-UP:  Scheduled a hearing aid check in two weeks.

## 2024-04-08 ENCOUNTER — NON-APPOINTMENT (OUTPATIENT)
Age: 74
End: 2024-04-08

## 2024-05-17 ENCOUNTER — APPOINTMENT (OUTPATIENT)
Dept: NEUROLOGY | Facility: CLINIC | Age: 74
End: 2024-05-17

## 2024-07-18 ENCOUNTER — APPOINTMENT (OUTPATIENT)
Dept: UROLOGY | Facility: CLINIC | Age: 74
End: 2024-07-18
Payer: MEDICARE

## 2024-07-18 VITALS
WEIGHT: 211 LBS | DIASTOLIC BLOOD PRESSURE: 70 MMHG | HEART RATE: 76 BPM | BODY MASS INDEX: 31.16 KG/M2 | SYSTOLIC BLOOD PRESSURE: 157 MMHG

## 2024-07-18 PROCEDURE — 99212 OFFICE O/P EST SF 10 MIN: CPT

## 2024-09-21 ENCOUNTER — OFFICE (OUTPATIENT)
Dept: URBAN - METROPOLITAN AREA CLINIC 112 | Facility: CLINIC | Age: 74
Setting detail: OPHTHALMOLOGY
End: 2024-09-21
Payer: MEDICARE

## 2024-09-21 DIAGNOSIS — H25.13: ICD-10-CM

## 2024-09-21 DIAGNOSIS — H16.221: ICD-10-CM

## 2024-09-21 DIAGNOSIS — H43.391: ICD-10-CM

## 2024-09-21 DIAGNOSIS — H16.223: ICD-10-CM

## 2024-09-21 DIAGNOSIS — H16.222: ICD-10-CM

## 2024-09-21 PROCEDURE — 92012 INTRM OPH EXAM EST PATIENT: CPT | Performed by: OPHTHALMOLOGY

## 2024-09-21 PROCEDURE — 83861 MICROFLUID ANALY TEARS: CPT | Mod: QW,RT | Performed by: OPHTHALMOLOGY

## 2024-09-21 PROCEDURE — 83861 MICROFLUID ANALY TEARS: CPT | Mod: QW,LT | Performed by: OPHTHALMOLOGY

## 2024-09-30 ENCOUNTER — APPOINTMENT (OUTPATIENT)
Dept: NEUROLOGY | Facility: CLINIC | Age: 74
End: 2024-09-30
Payer: MEDICARE

## 2024-09-30 VITALS — HEIGHT: 69 IN | SYSTOLIC BLOOD PRESSURE: 106 MMHG | DIASTOLIC BLOOD PRESSURE: 68 MMHG

## 2024-09-30 PROCEDURE — 99213 OFFICE O/P EST LOW 20 MIN: CPT

## 2024-09-30 PROCEDURE — G2211 COMPLEX E/M VISIT ADD ON: CPT

## 2024-09-30 NOTE — ASSESSMENT
[FreeTextEntry1] : This is a 74-year-old man with advanced idiopathic Parkinson's disease.  At this time he still wishes not to take medication for side effect reason.  I will ask him to see a functional neurosurgeon for evaluation for other surgical treatment modalities.  I will see him back in 6 months, sooner should the need arise.

## 2024-09-30 NOTE — PHYSICAL EXAM
[Person] : oriented to person [Place] : oriented to place [Time] : disoriented to time [Remote Intact] : remote memory intact [Registration Intact] : recent registration memory intact [Span Intact] : the attention span was normal [Concentration Intact] : normal concentrating ability [Visual Intact] : visual attention was ~T not ~L decreased [Naming Objects] : no difficulty naming common objects [Repeating Phrases] : no difficulty repeating a phrase [Fluency] : fluency intact [Comprehension] : comprehension intact [Past History] : adequate knowledge of personal past history [Cranial Nerves Optic (II)] : visual acuity intact bilaterally,  visual fields full to confrontation, pupils equal round and reactive to light [Cranial Nerves Trigeminal (V)] : facial sensation intact symmetrically [Cranial Nerves Facial (VII)] : face symmetrical [Cranial Nerves Vestibulocochlear (VIII)] : hearing was intact bilaterally [Cranial Nerves Glossopharyngeal (IX)] : tongue and palate midline [Cranial Nerves Accessory (XI - Cranial And Spinal)] : head turning and shoulder shrug symmetric [Cranial Nerves Hypoglossal (XII)] : there was no tongue deviation with protrusion [Cranial Nerves Oculomotor - Right Only] : 3rd cranial nerve palsy [No Muscle Atrophy] : normal bulk in all four extremities [Paresis Pronator Drift Right-Sided] : no pronator drift on the right [Paresis Pronator Drift Left-Sided] : no pronator drift on the left [Motor Strength Upper Extremities Bilaterally] : strength was normal in both upper extremities [Motor Strength Lower Extremities Bilaterally] : strength was normal in both lower extremities [Sensation Tactile Decrease] : light touch was intact [Sensation Pain / Temperature Decrease] : pain and temperature was intact [Sensation Vibration Decrease] : vibration was intact [Proprioception] : proprioception was intact [Tremor] : a tremor present [Coordination - Dysmetria Impaired Finger-to-Nose Bilateral] : not present [1+] : Patella left 1+ [FreeTextEntry6] : Mild diffuse nonfocal weakness, increased tone bilateral with cogwheeling [FreeTextEntry8] :  slow shuffling steps [Sclera] : the sclera and conjunctiva were normal [PERRL With Normal Accommodation] : pupils were equal in size, round, reactive to light, with normal accommodation [No APD] : no afferent pupillary defect [No SAMUEL] : no internuclear ophthalmoplegia [Full Visual Field] : full visual field [FreeTextEntry1] : 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

## 2024-09-30 NOTE — CONSULT LETTER
[Dear  ___] : Dear  [unfilled], [Courtesy Letter:] : I had the pleasure of seeing your patient, [unfilled], in my office today. [Please see my note below.] : Please see my note below. [Consult Closing:] : Thank you very much for allowing me to participate in the care of this patient.  If you have any questions, please do not hesitate to contact me. [Sincerely,] : Sincerely, [FreeTextEntry3] : Edgard Maldonado M.D., Ph.D. DPN-N [facility] at [place] Director, Division of Neurology Director, Comprehensive Stroke Center [facility]

## 2024-09-30 NOTE — REVIEW OF SYSTEMS
[Change In Personality] : personality change [Emotional Problems] : emotional problems [As Noted in HPI] : as noted in HPI [Confused or Disoriented] : confusion [Memory Lapses or Loss] : memory loss [Repeating Questions] : repeated questioning about recent events [Negative] : Heme/Lymph [de-identified] : Significant tremor

## 2024-09-30 NOTE — HISTORY OF PRESENT ILLNESS
[FreeTextEntry1] : Posthospitalization visit June 12, 2023: This is a 72-year-old man with idiopathic Parkinson disease who was seen in the hospital last week.  He has a long history of Parkinson disease and is not on any medication.  He had originally been on Sinemet and had side effects and was not started on anything else.  More recently he was given Nuplazid for hallucination and had side effects and stopped that.  He was seen last week in the hospital with altered mental status and hallucinations this was likely due to to a new prescription of atropine that was given to them to control secretions and drooling.  He is back to his baseline now that the atropine is out of his system.  At this point his wife prefers for him not to take any medications because he seems to do worse on them then off of them.  He is here today for neurological follow-up  Follow-up December 15, 2023: This is a 73-year-old man with idiopathic Parkinson's disease.  He has a longstanding history of Parkinson's disease.  When he takes medications for Parkinson's disease it makes him severely fatigued and tired.  He is already sleeping 12 to 12-1/2 hours a day.  His wife is concerned that if he starts medicine for Parkinson's disease he will sleep even longer which is not optimal.  There goal is for him to be awake during the day and be awake enough and alert enough to feed himself and to take walks when wanted.  He is overall largely unchanged from his last visit.  He did have some episodes of altered mental status and sounded like he was a bit belligerent earlier this week.  This is cleared up today.  He is here today for neurologic follow-up.  Follow-up by September 30, 2024: This is a 74-year-old man with idiopathic Parkinson's disease.  He is intolerant of medication due to fatigue.  When discussed with his wife she does not wish for him to be on medicine for Parkinson's.  Her concern is having emotional outbursts.  He also has a lot of tremor.  He is frustrated that he cannot do the things that he once did.  He is here today for neurologic follow-up.

## 2024-10-01 ENCOUNTER — APPOINTMENT (OUTPATIENT)
Dept: CARDIOLOGY | Facility: CLINIC | Age: 74
End: 2024-10-01
Payer: MEDICARE

## 2024-10-01 ENCOUNTER — NON-APPOINTMENT (OUTPATIENT)
Age: 74
End: 2024-10-01

## 2024-10-01 VITALS
HEART RATE: 69 BPM | WEIGHT: 202 LBS | BODY MASS INDEX: 29.92 KG/M2 | HEIGHT: 69 IN | TEMPERATURE: 97.9 F | OXYGEN SATURATION: 97 % | RESPIRATION RATE: 16 BRPM

## 2024-10-01 VITALS — SYSTOLIC BLOOD PRESSURE: 113 MMHG | DIASTOLIC BLOOD PRESSURE: 73 MMHG

## 2024-10-01 DIAGNOSIS — Q22.8 OTHER CONGENITAL MALFORMATIONS OF TRICUSPID VALVE: ICD-10-CM

## 2024-10-01 DIAGNOSIS — G20.A1 PARKINSON'S DISEASE WITHOUT DYSKINESIA, WITHOUT MENTION OF FLUCTUATIONS: ICD-10-CM

## 2024-10-01 DIAGNOSIS — N40.0 BENIGN PROSTATIC HYPERPLASIA WITHOUT LOWER URINARY TRACT SYMPMS: ICD-10-CM

## 2024-10-01 DIAGNOSIS — R60.0 LOCALIZED EDEMA: ICD-10-CM

## 2024-10-01 DIAGNOSIS — I07.9 RHEUMATIC TRICUSPID VALVE DISEASE, UNSPECIFIED: ICD-10-CM

## 2024-10-01 DIAGNOSIS — R60.9 EDEMA, UNSPECIFIED: ICD-10-CM

## 2024-10-01 PROCEDURE — G2211 COMPLEX E/M VISIT ADD ON: CPT

## 2024-10-01 PROCEDURE — 93000 ELECTROCARDIOGRAM COMPLETE: CPT

## 2024-10-01 PROCEDURE — 99215 OFFICE O/P EST HI 40 MIN: CPT

## 2024-10-01 RX ORDER — OMEPRAZOLE 20 MG/1
20 TABLET, DELAYED RELEASE ORAL DAILY
Refills: 0 | Status: ACTIVE | COMMUNITY
Start: 2024-10-01

## 2024-10-01 NOTE — CARDIOLOGY SUMMARY
[de-identified] : 10 1 2024:  Sinus Rhythm  Low voltage in precordial leads. -Incomplete left bundle branch block. ABNORMAL 9 14 2023  Sinus Rhythm  Low voltage in precordial leads.  ABNORMAL  11 8 2022 Sinus  Rhythm  - occasional ectopic ventricular beat    Low voltage in precordial leads.   ABNORMAL    11 9 2021  Sinus  Rhythm  -Frequent pvcs -ventricular trigeminy  -Left atrial enlargement.   Non specific ST t changes.   ABNORMAL RHYTHM [de-identified] : aug 2023:  LVEf 53%.   grade   diastolic dysfunction . normal RV no significant valvular abnormality . no efffusion.  EF improved from 49% to 53 3 mar 2021:  LVEF 49%. grade   diastolic dysfunction . Normal Rv.  no significant valvular abnormality mild PAHz  [de-identified] : caroitid dUplex:  minimap plaque. no stenosis  [___] : [unfilled]

## 2024-10-01 NOTE — DISCUSSION/SUMMARY
[Patient] : the patient [Risks] : risks [Benefits] : benefits [Alternatives] : alternatives [With Me] : with me [___ Year(s)] : in [unfilled] year(s) [FreeTextEntry1] : This is a  72year old male with Parkinsons, had  bacteremia with tricuspid valve density.  1) Tricuspid valve density/disorder: No obvious vegetations.   2) cardiomyopathy : LVEF  53 % conservative management : patient and family defers any stress test . aspirin and defers statins   patients   defers aggressive testingh.  repeat transthoracic echocardiogram  3)  LE edema: resolved. venous insufficiency.  resolved ace  wrap. elevate legs.  conservative management.  4) significant Parkinson's:  f/u with neurology  5) mild dysautonomia: no significant symptoms. no meds fo rnow.  conservative management for now. hydration.   Will order and review ECG for the above mentioned diagnosis/condition/symptoms   [EKG obtained to assist in diagnosis and management of assessed problem(s)] : EKG obtained to assist in diagnosis and management of assessed problem(s)

## 2024-10-01 NOTE — HISTORY OF PRESENT ILLNESS
[FreeTextEntry1] : tricuspid valve vegetation   HPI for today:   10 1 2024:  he denie snay chest pain . no dyspnea on exertion .  no syncope. no palptiations.  NO dizizness. ambulates 3 time s aday with help.  no leg edema.   [place]  [place] and he was confused and dry.  sensistive to meds.  not taking covid .   no chest pain . no dyspnea on exertion . no leg edemea.    old notE: he has parkinsons and he has unsteady .  he tries to be independent. he is dependant.  and he tries to get up by himself and he falls.  he has not syncope. no dizziness.  no dyspnea on exertion  he is seeing aneurologist  old note:   feels good. no cehst pain. no dyspnea on exertion . no LE edema.   no syncope. no palptiaitons. no ankle swelling   old note: feels good. no ches tpain. no headaches. no passing. no dizziness,. no weakness.  generalized treamors. not on any parkinson meds.    old note: yovani was seen in hospital. patient had infection of the blood. was on abx. 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 However, we could not rule out vegetations. He was asked to follow up after abx to repeat the TTE and possible FAYE to see any progression of the density and to check if we need to restart abx and treat him as endocarditis.   patient stopped his Abx on thursday.

## 2024-10-15 ENCOUNTER — APPOINTMENT (OUTPATIENT)
Dept: CARDIOLOGY | Facility: CLINIC | Age: 74
End: 2024-10-15
Payer: MEDICARE

## 2024-10-15 PROCEDURE — 93306 TTE W/DOPPLER COMPLETE: CPT

## 2024-10-22 NOTE — PROVIDER CONTACT NOTE (CRITICAL VALUE NOTIFICATION) - TEST AND RESULT REPORTED:
Progress Note - Colorectal   Name: Darin Bullock 55 y.o. male I MRN: [id number]  Unit/Bed#: W -01 I Date of Admission: 10/21/2024  Assessment & Plan  Constipation  Darin Bullock is a 55 y.o. male with no significant PMHx or PSHx presenting due to 1 week of constipation and new onset nausea and vomiting following consumption of GoLytely today.  CT scan shows large volume stool and gas throughout the colon, with the exception of decompressed sigmoid colon, concern for large bowel obstruction.     - Clears as tolerated  - suppositories and enemas  - monitor abdominal exam  - if no return of bowel function in 48 hours from admission, recommend engaging GI  -No surgical intervention at this time, we will continue to follow    Please contact the SecureChat role for the Colorectal service with any questions/concerns.    Subjective   NAEON.  Afebrile, hypertensive to 161/105 overnight, vitals otherwise stable on room air. Patient reports pain is controlled just having feelings of abdominal bloating.  Not tolerating clear liquid diet.  Reports intermittent nausea and 1 episode of emesis overnight.  Voiding spontaneously not yet passing flatus or having bowel movements.  States he has received suppository and enemas without bowel function yet.  Denies fever or chills.    Objective :  Temp:  [97.4 °F (36.3 °C)-98 °F (36.7 °C)] 97.6 °F (36.4 °C)  HR:  [] 95  BP: (130-179)/() 161/105  Resp:  [16-18] 16  SpO2:  [94 %-97 %] 94 %  O2 Device: None (Room air)    I/O         10/20 0701  10/21 0700 10/21 0701  10/22 0700    IV Piggyback  1000    Total Intake  1000    Urine  150    Total Output  150    Net  +850                  Physical Exam  General: NAD  HENT: NCAT MMM  Neck: supple, no JVD  CV: nl rate  Lungs: nl wob. No resp distress  ABD: Soft, nontender, distended, tympanic.  Extrem: No CCE  Neuro: AAOx3    Lab Results: I have reviewed the following results:  Recent Labs     10/21/24  1610 10/22/24  0510   WBC  14.92* 10.59*   HGB 16.6 14.9   HCT 47.9 44.0   * 347   SODIUM 139  --    K 3.2*  --    CL 99  --    CO2 26  --    BUN 21  --    CREATININE 1.55*  --    GLUC 128  --    MG 2.6  --    AST 17  --    ALT 13  --    ALB 4.9  --    TBILI 0.90  --    ALKPHOS 64  --        VTE Pharmacologic Prophylaxis: VTE covered by:    None      VTE Mechanical Prophylaxis: sequential compression device    Amor Garcia MD  General Surgery Resident      lab + BC + gram COCCI Clusters anerobic bottles

## 2024-10-23 NOTE — PATIENT PROFILE ADULT - BRADEN NUTRITION
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  CT showed progression of the pelvic mass. Seen by General Surgery, Oncology as well as Pain Clinic. Initially receiving IV morphine, started on p.o. narcotics. Subsequently, pain got improved. Discharge planning home.     Metastatic appendiceal adenocarcinoma, currently followed by Dr. Horton.  He has discussed with the patient regarding the different treatment options; debulking palliative surgery versus radiation versus further chemo.  Seen by Radiation Oncology here too.  The patient is leaning towards surgery. Seen by Dr. Wagoner here, who has also discussed with Dr. Gray and reviewed the CT and  not recommending surgery here due to mass is very adjacent  to the vital structures.  Re[facility] to [facility].  Dr. Horton has discussed at the [facility] and the patient is waiting for the followup appointment and she has also left a message with them.  She has to further coordinate with Dr. Horton.  If Surgery declines, then needs to follow up with Radiation Oncology for palliative radiation.  The patient was seen by Pain Clinic and offered a celiac plexus block, but  the patient's pain got controlled at present.  She wants to wait.  Malignant ascites; has PleurX catheter in the abdominal cavity, drains every other day around 1.5 to 2 L. Malignant pleural effusion, decreased compared to before.      Hyponatremia as well as hyperkalemia.  Seen by Dr. Cottrell from Renal. Potassium was 5.5 and constipated. Given stool softener and Kayexalate. Started getting good bowel movement, k came back to 5.0.  Discharge okay with Dr. Cottrell and follow up further as an outpatient.  Ordered sodium chloride tablets 1 g b.i.d., free water 
restriction 1.5 L per day. Etiology of hyponatremia could be low solute and excessive free water intake versus SIADH in the  setting of cancer versus ongoing pain.  Her urine sodium was less than 20, urine osmolality 501, serum osmolality was 281, and uric acid was 6.3. Low-potassium diet instructions provided. Needs to follow up closely for one week in the lab. She said she may even go to Dr. Horton's office on Thursday or PCP's office, specifically, to Dr. Cottrell.  She is urinating okay without any urinary symptoms.       Hyperkalemia, low potassium diet.  Serum cortisol level was low.  She has done the ACTH stimulation test.  The patient had received a dose of steroids here before the ACTH test. She does not think the patient has adrenal insufficiency or need to do any steroids, ACTH 15.2.  COVID-19 negative. Mild thrombocytopenia, resolved.      The patient had significant narcotic induced constipation.  Received docusate with senna, MiraLAX powder.  Needs to get enema as well as magnesium citrate.  She needs to go home with docusate with senna twice a day, MiraLAX ordered b.i.d.  If not better, she can use over-the-counter p.r.n. milk of magnesia, magnesium citrate prn basis.  She received also a dose of Relistor without relief of constipation before mag citrate      Also seen by Dr. Romero.  Reviewed the consult.  She has given the patient options regarding possible 3 weeks' course and possible short-term and long-term side-effects, risks and benefits.  She wants to think about other options before the radiation.      PHYSICAL EXAMINATION:  The patient was seen and examined.  Denies any headache, chest pain, cough, shortness of breath.  Her pain is improving.  She is taking the p.o. medication.  Pain is around 2-4 in intensity.     VITAL SIGNS:  Temperature 98, pulse 75, respiratory rate 18, blood pressure 102/69, saturation 99% on room air.  Okay to discharge from Renal, Oncology, also seen by Radiation Oncology. 
    GENERAL:  Alert and oriented x3, in no acute distress.     HEENT:  Normal.     CARDIOVASCULAR:  S1, S2.  Regular rate and rhythm.  No S3.     LUNGS:  Clear to auscultation, no crackles or wheezes.  The patient has MediPort on the right side of chest.     ABDOMEN:  Soft, bowel sounds normal, right lower quadrant palpable mass with mild tenderness, no rebound or guarding.     EXTREMITIES:  No swelling, no calf tenderness.     NEURO:  Alert and oriented x3, no focal deficits.     PSYCH:  The patient is cooperative, mood and affect appropriate.    LABORATORY DATA:  On discharge; sodium 137, potassium 5.0, BUN is 15, creatinine 0.78, albumin 1.3, total protein 4.6, total bilirubin 0.1, SGOT is 23, SGPT is 23, alkaline phosphatase 84.  WBC is 5.4, hemoglobin 12.2, hematocrit 38, platelets 174.  Urinalysis negative for nitrite, trace leukocyte esterase, 1 to 5 wbc's, few bacteria, more than 26 epithelial cells.  COVID-19 negative.    RADIOLOGY STUDIES:  CT abdomen and pelvis done with contrast, see the results for the details.  Impression interval increased size of right lower quadrant pelvic mass, related to stable midline pelvic mass, small to moderate volume ascites, indwelling peritoneal drainage catheter, no fluid collection or pneumoperitoneum, nonvisualized appendix, no bowel obstruction, persistent hepatic metastatic disease, no gross adenopathy.  Liver showed innumerable hypoattenuated lesions scattered throughout, predominantly in the upper portion of the  right and left hepatic lobe, similar when compared to the prior.  Gallbladder distended with sludge in the body.  Subcentimeter low-density lesion in the upper pole of the left kidney, too small to accurately characterize.  Nonobstructive stone in the right kidney lower pole.  Small-to-moderate amount of ascites, decreased from prior.  The pelvic mass is around 14 cm in craniocaudal dimension and the right mid abdominal mass is measuring 15 cm.   
Moderate-sized pleural effusion on right side, decreased from prior compressive atelectasis.    PRIMARY CARE PHYSICIAN:  Dr. Rao at [place], but she is seeing mainly Dr. Horton.    CONSULTANTS:  Dr. Murillo from Oncology, Dr. Cottrell from Renal, Dr. Romero from Radiation Oncology, Dr. Henao from Pain Clinic, Dr. Wagoner, surgeon.    ALLERGIES:  NOTED.  Allergies (2) Active Reaction  Contrast Dye unknown  iodinated radiocontrast Unknown  dye      DISCHARGE MEDICATIONS:  See final reconciled medications.   D/C Medications  oxyCODONE (oxyCODONE 5 mg oral tablet) 5 mg = 1 tab, PO, Tablet, q4hr, PRN as needed for pain, Dx Metastatic cancer  Pt has at home., # 1 can, 0 Refill(s)  10/31/2020 17:33  ondansetron (Zofran 4 mg oral tablet) 4 mg = 1 tab, PO, Tablet, q8hr, PRN Nausea/Vomiting, # 60 tab, 0 Refill(s), Pharmacy: [facility]/pharmacy #[id number]  10/31/2020 17:30  docusate-senna (docusate-senna 50 mg-8.6 mg oral tablet) 1 tab, PO, BID, For constipation., # 60 tab, 0 Refill(s), Tablet  10/31/2020 16:45  oxyCODONE (oxyCODONE 15 mg oral tablet, extended release) 15 mg = 1 tab, PO, q12hr, Dx Metastatic cancer, # 60 tab, 0 Refill(s)  10/31/2020 16:45  pantoprazole (pantoprazole 40 mg oral EC tablet) 40 mg = 1 tab, PO, EC Tablet, qDay, For acid reflux., # 30 tab, 0 Refill(s)  10/31/2020 16:45  polyethylene glycol 3350 (MiraLax 17 g oral powder) 17 g, PO, Suspension, BID, # 527 g, 0 Refill(s)  10/31/2020 16:45  sodium chloride (sodium chloride 1 g oral tablet) 1 g = 1 tab, PO, Tablet, q12hrS  10/31/2020 16:45  acetaminophen 325 mg, PO, q6hr, PRN as needed for pain  10/26/2020 14:46  dicyclomine (dicyclomine 20 mg oral tablet) 20 mg = 1 tab, PO, QID, PRN stomach cramps  10/26/2020 14:46  LORazepam (LORazepam 1 mg oral tablet) 1 mg = 1 tab, PO, TID, PRN Nausea  10/26/2020 14:46   I discussed with Dr. Horton's partner the patient needs a pain medication.  I gave the prescription of oxycodone extended release q.12 h., 15 mg, 60 tablets.  
The patient has oxycodone 5 mg at home.  Received just 60 tablets before admission, and she has only used the 2 tablets.  She will use oxycodone 5 mg q.4 h. p.r.n.  Subsequently, next day, the patient did not get oxycodone CR because pharmacy did not have it, so I called the Oncology  Service, but not received any answer, we will switch to morphine extended release 15 mg q.8 h. to q.12 h.  Pharmacy has 58 tablets, needs to discontinue prescription of the oxycodone, OxyContin is not covered by her insurance.  She needs to follow up with the oncologist as soon as possible for further pain management.  The patient's  will  the scripts from the hospital.     Discharge process took 45 minutes.        ______________________________  Madonna Cobos MD  [id number]      D:  11/02/2020 06:48:48  T:  11/03/2020 05:14:35   LHS/modl   Job:  [id number]  
operating room
(3) adequate

## 2024-11-20 NOTE — DISCHARGE NOTE NURSING/CASE MANAGEMENT/SOCIAL WORK - PATIENT PORTAL LINK FT
You can access the FollowMyHealth Patient Portal offered by [facility] by registering at the following website: [email or web address]. By joining [employer]’s FollowMyHealth portal, you will also be able to view your health information using other applications (apps) compatible with our system.
no concerns

## 2025-01-05 ENCOUNTER — NON-APPOINTMENT (OUTPATIENT)
Age: 75
End: 2025-01-05

## 2025-02-14 ENCOUNTER — EMERGENCY (EMERGENCY)
Facility: HOSPITAL | Age: 75
LOS: 1 days | Discharge: HOME | End: 2025-02-14
Attending: STUDENT IN AN ORGANIZED HEALTH CARE EDUCATION/TRAINING PROGRAM
Payer: MEDICARE

## 2025-02-14 VITALS
DIASTOLIC BLOOD PRESSURE: 86 MMHG | HEART RATE: 93 BPM | SYSTOLIC BLOOD PRESSURE: 136 MMHG | RESPIRATION RATE: 18 BRPM | TEMPERATURE: 98 F | OXYGEN SATURATION: 96 % | WEIGHT: 220.02 LBS

## 2025-02-14 DIAGNOSIS — Z98.890 OTHER SPECIFIED POSTPROCEDURAL STATES: Chronic | ICD-10-CM

## 2025-02-14 LAB
ALBUMIN SERPL ELPH-MCNC: 4.2 G/DL — SIGNIFICANT CHANGE UP (ref 3.3–5.2)
ALP SERPL-CCNC: 67 U/L — SIGNIFICANT CHANGE UP (ref 40–120)
ALT FLD-CCNC: 17 U/L — SIGNIFICANT CHANGE UP
ANION GAP SERPL CALC-SCNC: 13 MMOL/L — SIGNIFICANT CHANGE UP (ref 5–17)
AST SERPL-CCNC: 28 U/L — SIGNIFICANT CHANGE UP
BASOPHILS # BLD AUTO: 0.03 K/UL — SIGNIFICANT CHANGE UP (ref 0–0.2)
BASOPHILS NFR BLD AUTO: 0.3 % — SIGNIFICANT CHANGE UP (ref 0–2)
BILIRUB SERPL-MCNC: 0.4 MG/DL — SIGNIFICANT CHANGE UP (ref 0.4–2)
BUN SERPL-MCNC: 18.1 MG/DL — SIGNIFICANT CHANGE UP (ref 8–20)
CALCIUM SERPL-MCNC: 9 MG/DL — SIGNIFICANT CHANGE UP (ref 8.4–10.5)
CHLORIDE SERPL-SCNC: 103 MMOL/L — SIGNIFICANT CHANGE UP (ref 96–108)
CO2 SERPL-SCNC: 24 MMOL/L — SIGNIFICANT CHANGE UP (ref 22–29)
CREAT SERPL-MCNC: 1.06 MG/DL — SIGNIFICANT CHANGE UP (ref 0.5–1.3)
EGFR: 73 ML/MIN/1.73M2 — SIGNIFICANT CHANGE UP
EOSINOPHIL # BLD AUTO: 0.02 K/UL — SIGNIFICANT CHANGE UP (ref 0–0.5)
EOSINOPHIL NFR BLD AUTO: 0.2 % — SIGNIFICANT CHANGE UP (ref 0–6)
GAS PNL BLDV: SIGNIFICANT CHANGE UP
GLUCOSE SERPL-MCNC: 108 MG/DL — HIGH (ref 70–99)
HCT VFR BLD CALC: 42 % — SIGNIFICANT CHANGE UP (ref 39–50)
HGB BLD-MCNC: 13.7 G/DL — SIGNIFICANT CHANGE UP (ref 13–17)
IMM GRANULOCYTES # BLD AUTO: 0.04 K/UL — SIGNIFICANT CHANGE UP (ref 0–0.07)
IMM GRANULOCYTES NFR BLD AUTO: 0.4 % — SIGNIFICANT CHANGE UP (ref 0–0.9)
LIDOCAIN IGE QN: 16 U/L — LOW (ref 22–51)
LYMPHOCYTES # BLD AUTO: 0.72 K/UL — LOW (ref 1–3.3)
LYMPHOCYTES NFR BLD AUTO: 7.1 % — LOW (ref 13–44)
MCHC RBC-ENTMCNC: 28.1 PG — SIGNIFICANT CHANGE UP (ref 27–34)
MCHC RBC-ENTMCNC: 32.6 G/DL — SIGNIFICANT CHANGE UP (ref 32–36)
MCV RBC AUTO: 86.2 FL — SIGNIFICANT CHANGE UP (ref 80–100)
MONOCYTES # BLD AUTO: 0.46 K/UL — SIGNIFICANT CHANGE UP (ref 0–0.9)
MONOCYTES NFR BLD AUTO: 4.5 % — SIGNIFICANT CHANGE UP (ref 2–14)
NEUTROPHILS # BLD AUTO: 8.92 K/UL — HIGH (ref 1.8–7.4)
NEUTROPHILS NFR BLD AUTO: 87.5 % — HIGH (ref 43–77)
NRBC # BLD AUTO: 0 K/UL — SIGNIFICANT CHANGE UP (ref 0–0)
NRBC # FLD: 0 K/UL — SIGNIFICANT CHANGE UP (ref 0–0)
NRBC BLD AUTO-RTO: 0 /100 WBCS — SIGNIFICANT CHANGE UP (ref 0–0)
PLATELET # BLD AUTO: 178 K/UL — SIGNIFICANT CHANGE UP (ref 150–400)
PMV BLD: 10.9 FL — SIGNIFICANT CHANGE UP (ref 7–13)
POTASSIUM SERPL-MCNC: 5.4 MMOL/L — HIGH (ref 3.5–5.3)
POTASSIUM SERPL-SCNC: 5.4 MMOL/L — HIGH (ref 3.5–5.3)
PROT SERPL-MCNC: 7.9 G/DL — SIGNIFICANT CHANGE UP (ref 6.6–8.7)
RBC # BLD: 4.87 M/UL — SIGNIFICANT CHANGE UP (ref 4.2–5.8)
RBC # FLD: 13.6 % — SIGNIFICANT CHANGE UP (ref 10.3–14.5)
SODIUM SERPL-SCNC: 140 MMOL/L — SIGNIFICANT CHANGE UP (ref 135–145)
WBC # BLD: 10.19 K/UL — SIGNIFICANT CHANGE UP (ref 3.8–10.5)
WBC # FLD AUTO: 10.19 K/UL — SIGNIFICANT CHANGE UP (ref 3.8–10.5)

## 2025-02-14 PROCEDURE — 74177 CT ABD & PELVIS W/CONTRAST: CPT | Mod: MC

## 2025-02-14 PROCEDURE — 74177 CT ABD & PELVIS W/CONTRAST: CPT | Mod: 26

## 2025-02-14 PROCEDURE — 99285 EMERGENCY DEPT VISIT HI MDM: CPT

## 2025-02-14 PROCEDURE — 99284 EMERGENCY DEPT VISIT MOD MDM: CPT | Mod: 25

## 2025-02-14 PROCEDURE — 83690 ASSAY OF LIPASE: CPT

## 2025-02-14 PROCEDURE — 36415 COLL VENOUS BLD VENIPUNCTURE: CPT

## 2025-02-14 PROCEDURE — 80053 COMPREHEN METABOLIC PANEL: CPT

## 2025-02-14 PROCEDURE — 85025 COMPLETE CBC W/AUTO DIFF WBC: CPT

## 2025-02-14 PROCEDURE — 74018 RADEX ABDOMEN 1 VIEW: CPT | Mod: 26

## 2025-02-14 PROCEDURE — 96374 THER/PROPH/DIAG INJ IV PUSH: CPT | Mod: XU

## 2025-02-14 PROCEDURE — 74018 RADEX ABDOMEN 1 VIEW: CPT

## 2025-02-14 PROCEDURE — 96375 TX/PRO/DX INJ NEW DRUG ADDON: CPT

## 2025-02-14 RX ORDER — IOHEXOL 350 MG/ML
30 INJECTION, SOLUTION INTRAVENOUS ONCE
Refills: 0 | Status: COMPLETED | OUTPATIENT
Start: 2025-02-14 | End: 2025-02-14

## 2025-02-14 RX ORDER — MORPHINE SULFATE 60 MG/1
4 TABLET, FILM COATED, EXTENDED RELEASE ORAL ONCE
Refills: 0 | Status: DISCONTINUED | OUTPATIENT
Start: 2025-02-14 | End: 2025-02-14

## 2025-02-14 RX ORDER — BACTERIOSTATIC SODIUM CHLORIDE 0.9 %
1000 VIAL (ML) INJECTION ONCE
Refills: 0 | Status: COMPLETED | OUTPATIENT
Start: 2025-02-14 | End: 2025-02-14

## 2025-02-14 RX ORDER — ONDANSETRON 4 MG/1
4 TABLET, ORALLY DISINTEGRATING ORAL ONCE
Refills: 0 | Status: COMPLETED | OUTPATIENT
Start: 2025-02-14 | End: 2025-02-14

## 2025-02-14 RX ADMIN — MORPHINE SULFATE 4 MILLIGRAM(S): 60 TABLET, FILM COATED, EXTENDED RELEASE ORAL at 18:46

## 2025-02-14 RX ADMIN — IOHEXOL 30 MILLILITER(S): 350 INJECTION, SOLUTION INTRAVENOUS at 18:47

## 2025-02-14 RX ADMIN — ONDANSETRON 4 MILLIGRAM(S): 4 TABLET, ORALLY DISINTEGRATING ORAL at 18:46

## 2025-02-14 RX ADMIN — Medication 1000 MILLILITER(S): at 18:46

## 2025-02-14 NOTE — ED ADULT TRIAGE NOTE - CCCP TRG CHIEF CMPLNT
Jm Deleon has met all discharge criteria from Phase II. Vital Signs are stable, ambulating  without difficulty. Discharge instructions given, patient verbalized understanding. Discharged from facility via wheelchair in stable condition.       
Patient prefers to have wife Narcisa present for discharge.  
constipation

## 2025-02-14 NOTE — ED ADULT NURSE NOTE - NSFALLHARMRISKINTERV_ED_ALL_ED

## 2025-02-14 NOTE — ED PROVIDER NOTE - NSFOLLOWUPINSTRUCTIONS_ED_ALL_ED_FT
** You declined to be evaluated by the surgery service and agree to bring him back for evaluation for any new symptoms.   ** Continue taking your bowel regimen and follow up with your primary care doctor in the next 72 hours. You should follow up with a GI doctor.   ** A referral coordinator will reach out to you to help you schedule an appointment with GI  ** Go to the nearest Emergency Department if you experience any new or concerning symptoms, such as:   - worsening pain  - chest pain  - difficulty breathing  - passing out  - unable to eat or drink  - unable to move or feel part of your body  - fever, chills

## 2025-02-14 NOTE — ED PROVIDER NOTE - IV ALTEPLASE ADMIN OUTSIDE HIDDEN
No protocol for requested medication.    Medication: Ibuprofen  Last office visit date: 6.10.2024  No upcoming OV.  Pharmacy: [facility] - [place], [place] - [address] [place] [address]    Order pended, routed to clinician for review.   
show

## 2025-02-14 NOTE — ED PROVIDER NOTE - ATTENDING CONTRIBUTION TO CARE
Dr. Pizarro: I personally saw the patient with the RESIDENT and performed a substantive portion of the visit. I performed a face to face bedside interview with patient regarding history of present illness, review of symptoms and past medical history. I completed an independent physical exam and all aspects of medical decision making. I have discussed patient's plan of care with resident. I agree with note as stated above, having amended the EMR as needed to reflect my findings. This includes HISTORY OF PRESENT ILLNESS, HIV, PAST MEDICAL/SURGICAL/FAMILY/SOCIAL HISTORY, ALLERGIES AND HOME MEDICATIONS, ROS, PHYSICAL EXAM, MEDICAL DECISION MAKING and any PROGRESS NOTES during the time I functioned as the attending physician for this patient.  SEE MDM

## 2025-02-14 NOTE — ED ADULT TRIAGE NOTE - CHIEF COMPLAINT QUOTE
Pt BIBA c/o constipation.  No BM since Saturday.  Pt saw PMD today and was given Kristalose with no relief.  Pt has hx of parkinson's and is minimally verbal.  Wife states pt is at baseline mental status.

## 2025-02-14 NOTE — ED PROVIDER NOTE - OBJECTIVE STATEMENT
74 year old male w/PMHx Parkinson's disease, dementia presents to the ED with abd pain. Wife at bedside, obtained most of history from her. States patient's last BM 6 days ago, usually has regular daily BM. With increasing abd pain since then associated with decreased PO intake. Passing gas. Since onset tried miralax, enema, and mag citrate with no relief. At baseline patient Axo3, verbal but requires persistent redirection. Otherwise compliant with home meds. No known sick contacts. Wife denies noticing any hematuria, dysuria, melena.

## 2025-02-14 NOTE — ED PROVIDER NOTE - CLINICAL SUMMARY MEDICAL DECISION MAKING FREE TEXT BOX
On arrival HD stable. Examination as noted. concern for SBO vs perforated bowel vs impacted stool. Will obtain blood work, CT w/wo contrast. IVF, zofran and morphine for sx management. On arrival HD stable. Examination as noted. concern for SBO vs perforated bowel vs impacted stool. Will obtain blood work, CT w/wo contrast. IVF, zofran and morphine for sx management.  ---------  Akila Pizarro MD, Attending  75 YO M hx of Parkinson's disease, dementia presents to the ED with abd pain and constipation. Hx of provided by wife and caretaker at bedside. Wife recently had lumbar radiculopathy and has been unable to be as involved with pt's care at home with aids. Pt previously had daily BMs but for last 6 days has not had BM because the aide agency has been sending new and different aids daily, which causes on the patient to "hold it in". Wife endorse he is passing gas. in the past 1-2 days, wife has given pt the following: miralax, enema, and mag citrate with no relief.   Gen: NAD   Head: NC/AT  Neck: trachea midline  card: RRR, S1,S2  Resp:  No distress, no increased wob, no wheeze, no rales, no rhonchi  abd: + mildly distended, soft, mild diffuse ttp.   Ext: no deformities  Neuro:  A&O appears non focal  Skin:  Warm and dry as visualized  Psych:  Normal affect and mood  ddx includes, but is not limited to the following: constipation, volvulus, mass, small bowel obstruction  plan: disimpaction, CT scan, fleet, reassess for need for surgery consult.   update: see progress notes

## 2025-02-14 NOTE — ED PROVIDER NOTE - PROGRESS NOTE DETAILS
Akila Pizarro MD, Attending  Potassium 5.4 but hemolyzed, low suspicion for hyperkalemia.   Pt had large bm, wife at bedside now declining to see surgery and agrees to follow up with GI for bowel regimen and work up to ro malignancy. Ambulette arranged. Akila Pizarro MD, Attending  Potassium 5.4 but hemolyzed, low suspicion for hyperkalemia.   Pt had large Bm, wife at bedside now declining to see surgery and agrees to follow up with GI for bowel regimen and work up to ro malignancy ( incidental finding/ . Ambulate arranged.

## 2025-02-14 NOTE — ED PROVIDER NOTE - PATIENT PORTAL LINK FT
You can access the FollowMyHealth Patient Portal offered by [facility] by registering at the following website: [email or web address]. By joining [email or web address]’s FollowMyHealth portal, you will also be able to view your health information using other applications (apps) compatible with our system.

## 2025-02-14 NOTE — ED PROVIDER NOTE - WR INTERPRETATION 1
Ventricular Rate : 61  Atrial Rate : 61  P-R Interval : 194  QRS Duration : 92  Q-T Interval : 414  QTC Calculation(Bazett) : 416  P Axis : 36  R Axis : 62  T Axis : 37  Diagnosis : Normal sinus rhythm  Low voltage QRS  Cannot rule out Anteroseptal infarct (cited on or before 21-MAR-2016)  ****Abnormal ECG****  When compared with ECG of 21-MAR-2016 06:41,  No significant change was found  Confirmed by JOSE D MERRITT MASOOD ([id number]) on 1/8/2020 11:17:16 PM   no air fluid levels

## 2025-02-14 NOTE — ED PROVIDER NOTE - PRINCIPAL DIAGNOSIS
"CRICKET completed for pt's wife, Macie Zamudio [phone]. Copy placed in chart per pt request.    Pt states that although there aren't legal documents for healthcare proxy/rep/POA, pt states, \"I want my wife to be involved in my health decisions because I know I make dumb choices sometimes.\"   "
"[facility] provider Dr. Muñoz contacted via EPIC chat for order management and notified that PTA list will be completed when [facility] in [place] pharmacy is open and that pt's CSSR-S Last Contact \"low risk.\" Pt takes ambien 10mg nightly which was ordered and refused in ED. Per report from [facility] ED and historical MAR, pt didn't want his scheduled dose in the ED, because it was \"too early\" but pt wants it now. Order placed by provider and pt received scheduled Ambien 10mg dose as ordered along with scheduled melatonin 3mg.    Pt arrived to unit with transdermal 24-hour nicotine patch 21mg on left upper arm and 72-hr fentanyl patch 75mcg intact applied to right dorsal shoulder. Patch placed this morning (9/18) at 0132 per MAR and writing on patch. Orders placed for removal of current 72hr patch and replacement patch application on 9/21 at 0100. QID ACHS blood glucose order placed and diet order changed to carb control level 2 by Dr. Muñoz r/t pt having DM.     Glucose checked at 00:19, was 300. Pt asymptomatic. [facility] provider Dr. Rizvi contacted via EPIC with immediate response. Pt tells nurse he takes \"40 units of lantus at 9pm\" nightly. Pt was very low in ED earlier, 44. Pt states staff supplemented him and he came to 132. 20:00 dose of Lispro 13u held in ED, but 40u lantus given prior to discharge at 21:15 per MAR. Provider informed and current ordered changed from lantus to 14u lispro, once. Given at 00:51. Provider informed by nurse that next glucose check will be around 07:45 before breakfast. Provider declined need for recheck before then, nurse will follow order set as requested by Dr. Rizvi as long as pt remains asymptomatic. Order for lispro sliding scale TID before meals placed.   "
Constipation

## 2025-02-14 NOTE — ED PROVIDER NOTE - PHYSICAL EXAMINATION
Gen: NAD, AOx3  Head: NCAT  HEENT: EOMI, oral mucosa moist, normal conjunctiva, neck supple  Lung: CTAB, no respiratory distress  CV: rrr, no murmur, Normal perfusion  Abd: abdominal distension, rigidity with diffuse tenderness  MSK: No edema, no visible deformities  Neuro: No focal neurologic deficits  Skin: No rash   Psych: normal affect

## 2025-02-15 VITALS
HEART RATE: 63 BPM | OXYGEN SATURATION: 95 % | SYSTOLIC BLOOD PRESSURE: 155 MMHG | RESPIRATION RATE: 19 BRPM | TEMPERATURE: 99 F | DIASTOLIC BLOOD PRESSURE: 79 MMHG

## 2025-03-12 ENCOUNTER — EMERGENCY (EMERGENCY)
Facility: HOSPITAL | Age: 75
LOS: 1 days | Discharge: HOME | End: 2025-03-12
Attending: STUDENT IN AN ORGANIZED HEALTH CARE EDUCATION/TRAINING PROGRAM
Payer: MEDICARE

## 2025-03-12 VITALS
HEART RATE: 64 BPM | OXYGEN SATURATION: 96 % | RESPIRATION RATE: 18 BRPM | DIASTOLIC BLOOD PRESSURE: 80 MMHG | SYSTOLIC BLOOD PRESSURE: 125 MMHG | TEMPERATURE: 98 F

## 2025-03-12 VITALS
HEART RATE: 60 BPM | RESPIRATION RATE: 18 BRPM | SYSTOLIC BLOOD PRESSURE: 171 MMHG | TEMPERATURE: 97 F | DIASTOLIC BLOOD PRESSURE: 84 MMHG | OXYGEN SATURATION: 99 %

## 2025-03-12 DIAGNOSIS — Z98.890 OTHER SPECIFIED POSTPROCEDURAL STATES: Chronic | ICD-10-CM

## 2025-03-12 LAB
ALBUMIN SERPL ELPH-MCNC: 3.9 G/DL — SIGNIFICANT CHANGE UP (ref 3.3–5.2)
ALP SERPL-CCNC: 64 U/L — SIGNIFICANT CHANGE UP (ref 40–120)
ALT FLD-CCNC: 11 U/L — SIGNIFICANT CHANGE UP
ANION GAP SERPL CALC-SCNC: 11 MMOL/L — SIGNIFICANT CHANGE UP (ref 5–17)
APPEARANCE UR: CLEAR — SIGNIFICANT CHANGE UP
APTT BLD: 25.7 SEC — SIGNIFICANT CHANGE UP (ref 24.5–35.6)
AST SERPL-CCNC: 13 U/L — SIGNIFICANT CHANGE UP
BASOPHILS # BLD AUTO: 0.03 K/UL — SIGNIFICANT CHANGE UP (ref 0–0.2)
BASOPHILS NFR BLD AUTO: 0.5 % — SIGNIFICANT CHANGE UP (ref 0–2)
BILIRUB SERPL-MCNC: 0.5 MG/DL — SIGNIFICANT CHANGE UP (ref 0.4–2)
BILIRUB UR-MCNC: NEGATIVE — SIGNIFICANT CHANGE UP
BUN SERPL-MCNC: 14.7 MG/DL — SIGNIFICANT CHANGE UP (ref 8–20)
CALCIUM SERPL-MCNC: 8.8 MG/DL — SIGNIFICANT CHANGE UP (ref 8.4–10.5)
CHLORIDE SERPL-SCNC: 102 MMOL/L — SIGNIFICANT CHANGE UP (ref 96–108)
CO2 SERPL-SCNC: 27 MMOL/L — SIGNIFICANT CHANGE UP (ref 22–29)
COLOR SPEC: YELLOW — SIGNIFICANT CHANGE UP
CREAT SERPL-MCNC: 0.88 MG/DL — SIGNIFICANT CHANGE UP (ref 0.5–1.3)
DIFF PNL FLD: NEGATIVE — SIGNIFICANT CHANGE UP
EGFR: 90 ML/MIN/1.73M2 — SIGNIFICANT CHANGE UP
EGFR: 90 ML/MIN/1.73M2 — SIGNIFICANT CHANGE UP
EOSINOPHIL # BLD AUTO: 0.2 K/UL — SIGNIFICANT CHANGE UP (ref 0–0.5)
EOSINOPHIL NFR BLD AUTO: 3.3 % — SIGNIFICANT CHANGE UP (ref 0–6)
FLUAV AG NPH QL: SIGNIFICANT CHANGE UP
FLUBV AG NPH QL: SIGNIFICANT CHANGE UP
GLUCOSE SERPL-MCNC: 73 MG/DL — SIGNIFICANT CHANGE UP (ref 70–99)
GLUCOSE UR QL: NEGATIVE MG/DL — SIGNIFICANT CHANGE UP
HCT VFR BLD CALC: 41.1 % — SIGNIFICANT CHANGE UP (ref 39–50)
HGB BLD-MCNC: 13 G/DL — SIGNIFICANT CHANGE UP (ref 13–17)
IMM GRANULOCYTES # BLD AUTO: 0.01 K/UL — SIGNIFICANT CHANGE UP (ref 0–0.07)
IMM GRANULOCYTES NFR BLD AUTO: 0.2 % — SIGNIFICANT CHANGE UP (ref 0–0.9)
INR BLD: 1.06 RATIO — SIGNIFICANT CHANGE UP (ref 0.85–1.16)
KETONES UR-MCNC: ABNORMAL MG/DL
LEUKOCYTE ESTERASE UR-ACNC: NEGATIVE — SIGNIFICANT CHANGE UP
LYMPHOCYTES # BLD AUTO: 1.25 K/UL — SIGNIFICANT CHANGE UP (ref 1–3.3)
LYMPHOCYTES NFR BLD AUTO: 20.8 % — SIGNIFICANT CHANGE UP (ref 13–44)
MCHC RBC-ENTMCNC: 27.8 PG — SIGNIFICANT CHANGE UP (ref 27–34)
MCHC RBC-ENTMCNC: 31.6 G/DL — LOW (ref 32–36)
MCV RBC AUTO: 87.8 FL — SIGNIFICANT CHANGE UP (ref 80–100)
MONOCYTES # BLD AUTO: 0.55 K/UL — SIGNIFICANT CHANGE UP (ref 0–0.9)
MONOCYTES NFR BLD AUTO: 9.2 % — SIGNIFICANT CHANGE UP (ref 2–14)
NEUTROPHILS # BLD AUTO: 3.96 K/UL — SIGNIFICANT CHANGE UP (ref 1.8–7.4)
NEUTROPHILS NFR BLD AUTO: 66 % — SIGNIFICANT CHANGE UP (ref 43–77)
NITRITE UR-MCNC: NEGATIVE — SIGNIFICANT CHANGE UP
NRBC # BLD AUTO: 0 K/UL — SIGNIFICANT CHANGE UP (ref 0–0)
NRBC # FLD: 0 K/UL — SIGNIFICANT CHANGE UP (ref 0–0)
NRBC BLD AUTO-RTO: 0 /100 WBCS — SIGNIFICANT CHANGE UP (ref 0–0)
PH UR: 5.5 — SIGNIFICANT CHANGE UP (ref 5–8)
PLATELET # BLD AUTO: 166 K/UL — SIGNIFICANT CHANGE UP (ref 150–400)
PMV BLD: 11.4 FL — SIGNIFICANT CHANGE UP (ref 7–13)
POTASSIUM SERPL-MCNC: 4.2 MMOL/L — SIGNIFICANT CHANGE UP (ref 3.5–5.3)
POTASSIUM SERPL-SCNC: 4.2 MMOL/L — SIGNIFICANT CHANGE UP (ref 3.5–5.3)
PROT SERPL-MCNC: 6.9 G/DL — SIGNIFICANT CHANGE UP (ref 6.6–8.7)
PROT UR-MCNC: NEGATIVE MG/DL — SIGNIFICANT CHANGE UP
PROTHROM AB SERPL-ACNC: 12 SEC — SIGNIFICANT CHANGE UP (ref 9.9–13.4)
RBC # BLD: 4.68 M/UL — SIGNIFICANT CHANGE UP (ref 4.2–5.8)
RBC # FLD: 13.5 % — SIGNIFICANT CHANGE UP (ref 10.3–14.5)
RSV RNA NPH QL NAA+NON-PROBE: SIGNIFICANT CHANGE UP
SARS-COV-2 RNA SPEC QL NAA+PROBE: SIGNIFICANT CHANGE UP
SODIUM SERPL-SCNC: 140 MMOL/L — SIGNIFICANT CHANGE UP (ref 135–145)
SP GR SPEC: 1.02 — SIGNIFICANT CHANGE UP (ref 1–1.03)
UROBILINOGEN FLD QL: 0.2 MG/DL — SIGNIFICANT CHANGE UP (ref 0.2–1)
WBC # BLD: 6 K/UL — SIGNIFICANT CHANGE UP (ref 3.8–10.5)
WBC # FLD AUTO: 6 K/UL — SIGNIFICANT CHANGE UP (ref 3.8–10.5)

## 2025-03-12 PROCEDURE — 99284 EMERGENCY DEPT VISIT MOD MDM: CPT

## 2025-03-12 PROCEDURE — 93005 ELECTROCARDIOGRAM TRACING: CPT

## 2025-03-12 PROCEDURE — 99285 EMERGENCY DEPT VISIT HI MDM: CPT | Mod: 25

## 2025-03-12 PROCEDURE — 85730 THROMBOPLASTIN TIME PARTIAL: CPT

## 2025-03-12 PROCEDURE — 85025 COMPLETE CBC W/AUTO DIFF WBC: CPT

## 2025-03-12 PROCEDURE — 0241U: CPT

## 2025-03-12 PROCEDURE — 93010 ELECTROCARDIOGRAM REPORT: CPT

## 2025-03-12 PROCEDURE — 85610 PROTHROMBIN TIME: CPT

## 2025-03-12 PROCEDURE — 71045 X-RAY EXAM CHEST 1 VIEW: CPT

## 2025-03-12 PROCEDURE — 87086 URINE CULTURE/COLONY COUNT: CPT

## 2025-03-12 PROCEDURE — 71045 X-RAY EXAM CHEST 1 VIEW: CPT | Mod: 26

## 2025-03-12 PROCEDURE — 87637 SARSCOV2&INF A&B&RSV AMP PRB: CPT

## 2025-03-12 PROCEDURE — 81003 URINALYSIS AUTO W/O SCOPE: CPT

## 2025-03-12 PROCEDURE — 36415 COLL VENOUS BLD VENIPUNCTURE: CPT

## 2025-03-12 PROCEDURE — 80053 COMPREHEN METABOLIC PANEL: CPT

## 2025-03-12 RX ADMIN — Medication 1000 MILLILITER(S): at 15:58

## 2025-03-12 NOTE — ED PROVIDER NOTE - PHYSICAL EXAMINATION
Gen: Well appearing in NAD  Head: NC/AT  Neck: trachea midline  Card: regular rate and rhythm  Resp:  CTAB, no wheezing, rales or rhonchi appreciated  Abd: soft, non-distended, non-tender  Ext: no deformities  Neuro: Awake, alert responds w/ nodding and some words (baseline per wife at bedside), VELASCO spontaneously, sensation intact and symmetrical b/l extremities, no facial droop, no slurred speech, EOMI  Skin:  Warm and dry as visualized  Psych:  Normal affect and mood

## 2025-03-12 NOTE — ED PROVIDER NOTE - PATIENT PORTAL LINK FT
You can access the FollowMyHealth Patient Portal offered by [facility] by registering at the following website: [email or web address]. By joining [email or web address]’s FollowMyHealth portal, you will also be able to view your health information using other applications (apps) compatible with our system.

## 2025-03-12 NOTE — ED ADULT TRIAGE NOTE - PAIN: PRESENCE, MLM
-- DO NOT REPLY / DO NOT REPLY ALL --  -- Message is from Engagement Center Operations (ECO) --    General Patient Message: Patient calling stating she has had a sore throat and sinus drainage for a few weeks.  Patient is wanting to know what Dr suggests she take.  Writer did triage-non emergent.  Writer unable to find opening to schedule patient today.  Patient requested message be sent up to Dr. Chilel Information       Type Contact Phone/Fax    03/09/2023 08:35 AM CST Phone (Incoming) Katharine Aguayo (Self) [phone] (H)        Alternative phone number: n/a    Can a detailed message be left? Yes    Message Turnaround:     Is it Working Hours? Yes - Working Hours     [place]:    Please give this turnaround time to the caller:   \"This message will be sent to [state Provider's name]. The clinical team will fulfill your request as soon as they review your message.\"                 denies pain/discomfort (Rating = 0)

## 2025-03-12 NOTE — ED ADULT TRIAGE NOTE - RESPIRATORY RATE (BREATHS/MIN)
[FreeTextEntry3] : IClyde Shabtai MD, personally saw and evaluated the patient and developed the plan as documented above. I concur or have edited the note as appropriate.\par 
18

## 2025-03-12 NOTE — ED PROVIDER NOTE - OBJECTIVE STATEMENT
74-year-old male patient past medical history of Parkinson's and BPH brought into emergency room by wife for episode of low blood pressure today.  Yesterday patient had physical therapy which she tolerated well.  Prior to physical therapy patient's blood pressure was noted to be low by the physical therapist.  Patient proceeded to exercise without complaints, feeling well, blood pressure improved without intervention.  Patient had an appointment with his primary today and his blood pressure was noted to be low in their office.  Primary doctor also reported that she heard some wheezing and one of his lungs so sent to ED to be investigated for infection.  Wife also reports urine slightly darker than usual.  Of note patient's grandchild has the flu.  He denies chest pain, shortness of breath, abdominal pain, nausea, vomit, diarrhea, cough, rhinorrhea, sore throat, urinary symptoms, or any other complaints. patient himself has no complaint and is anxious to go home

## 2025-03-12 NOTE — ED PROVIDER NOTE - CLINICAL SUMMARY MEDICAL DECISION MAKING FREE TEXT BOX
74-year-old male patient past medical history of Parkinson's and BPH brought into emergency room by wife for episode of low blood pressure today. Currently normotensive. No tachycardia, no hypoxia. Asymptomatic. Will work up for infection w/ labs, urine, cxr, viral swab. Given 1L bolus. 74-year-old male patient past medical history of Parkinson's and BPH brought into emergency room by wife for episode of low blood pressure today. Currently normotensive. No tachycardia, no hypoxia. Asymptomatic. Will work up for infection w/ labs, urine, cxr, viral swab. Given 1L bolus. Workup completely unremarkable. Normotensive here. without complaint. pt and wife would like to go home. Will DC w/ outpatient f/u. Pt and wife are comfortable w/ plan

## 2025-03-12 NOTE — ED ADULT TRIAGE NOTE - CHIEF COMPLAINT QUOTE
pt BIBEMS from his PCP for hypotension, per pts wife he was systolic 79 yesterday and systolic 80 today at this MD office, hx of dementia/parkinsons, pt offers no complaints of pain/discomfort/dizziness/HA/vision changes, pt normotensive in triage.

## 2025-03-12 NOTE — ED PROVIDER NOTE - ATTENDING CONTRIBUTION TO CARE
I, Jamarcus Coppola MD, personally saw the patient with the resident, and completed the key components of the history and physical exam. I then discussed the management plan with the resident.  Patient with a past medical history of Parkinson disease and BPH was brought in by his wife with concern for low blood pressure.  States that patient recently saw his grandkids who was diagnosed with the flu.  Yesterday patient found to have low blood pressure while getting physical therapy.  Was able to complete physical therapy session otherwise without complaints.  Today went to primary care doctor blood pressure was also found to be well so told to come to ED for evaluation.  Patient and wife otherwise without complaints here.  No cough, urinary symptoms, fevers, lightheadedness.  On physical exam here, wife states he is at baseline, able to answer very short questions.  Follows commands.  Heart and lung sounds are otherwise clear.  Patient not hypotensive here.  Will evaluate for infectious pathology though he is afebrile here and not tachycardic and not hypoxic.  Will evaluate for dehydration and electrolyte abnormalities.  Will check for flu and COVID.  Plan on lab work, imaging, fluids and reassessment.

## 2025-03-14 LAB
CULTURE RESULTS: SIGNIFICANT CHANGE UP
SPECIMEN SOURCE: SIGNIFICANT CHANGE UP

## 2025-03-15 ENCOUNTER — OFFICE (OUTPATIENT)
Dept: URBAN - METROPOLITAN AREA CLINIC 112 | Facility: CLINIC | Age: 75
Setting detail: OPHTHALMOLOGY
End: 2025-03-15
Payer: MEDICARE

## 2025-03-15 DIAGNOSIS — H43.391: ICD-10-CM

## 2025-03-15 DIAGNOSIS — H16.222: ICD-10-CM

## 2025-03-15 DIAGNOSIS — H16.223: ICD-10-CM

## 2025-03-15 DIAGNOSIS — H50.111: ICD-10-CM

## 2025-03-15 DIAGNOSIS — H16.221: ICD-10-CM

## 2025-03-15 DIAGNOSIS — H25.13: ICD-10-CM

## 2025-03-15 PROCEDURE — 92250 FUNDUS PHOTOGRAPHY W/I&R: CPT | Performed by: OPHTHALMOLOGY

## 2025-03-15 PROCEDURE — 92014 COMPRE OPH EXAM EST PT 1/>: CPT | Performed by: OPHTHALMOLOGY

## 2025-03-15 ASSESSMENT — REFRACTION_CURRENTRX
OS_CYLINDER: -0.25
OS_CYLINDER: -0.25
OD_AXIS: 110
OD_CYLINDER: -0.50
OD_SPHERE: +3.75
OS_SPHERE: +1.50
OD_AXIS: 100
OS_AXIS: 100
OS_OVR_VA: 20/
OD_OVR_VA: 20/
OD_CYLINDER: -0.25
OS_OVR_VA: 20/
OS_VPRISM_DIRECTION: SV
OD_VPRISM_DIRECTION: SV
OS_AXIS: 105
OD_OVR_VA: 20/
OD_SPHERE: +1.50
OS_SPHERE: +3.50

## 2025-03-15 ASSESSMENT — REFRACTION_MANIFEST
OD_SPHERE: +1.50
OS_AXIS: 080
OD_SPHERE: +1.50
OS_ADD: +2.50
OS_VA1: 20/40
OS_ADD: +2.50
OD_ADD: +2.50
OS_SPHERE: +2.00
OD_VA1: 20/25
OS_CYLINDER: -0.75
OS_CYLINDER: -0.75
OD_CYLINDER: SPH
OD_ADD: +2.50
OS_VA1: 20/30
OS_SPHERE: +1.75
OS_AXIS: 080
OD_CYLINDER: SPH
OD_VA1: 20/40

## 2025-03-15 ASSESSMENT — REFRACTION_AUTOREFRACTION
OD_AXIS: 071
OS_CYLINDER: -1.75
OS_SPHERE: +2.50
OD_SPHERE: +2.00
OS_AXIS: 086
OD_CYLINDER: -1.00

## 2025-03-15 ASSESSMENT — KERATOMETRY
OD_K1POWER_DIOPTERS: 42.00
OS_K2POWER_DIOPTERS: 46.25
OS_K1POWER_DIOPTERS: 45.50
OS_AXISANGLE_DEGREES: 037
OD_K2POWER_DIOPTERS: 44.50
OD_AXISANGLE_DEGREES: 097

## 2025-03-15 ASSESSMENT — CONFRONTATIONAL VISUAL FIELD TEST (CVF)
OD_FINDINGS: FULL
OS_FINDINGS: FULL

## 2025-03-15 ASSESSMENT — SUPERFICIAL PUNCTATE KERATITIS (SPK)
OS_SPK: T
OD_SPK: T

## 2025-03-15 ASSESSMENT — VISUAL ACUITY
OS_BCVA: 20/25
OD_BCVA: 20/80

## 2025-03-16 DIAGNOSIS — N40.0 BENIGN PROSTATIC HYPERPLASIA WITHOUT LOWER URINARY TRACT SYMPTOMS: ICD-10-CM

## 2025-03-16 DIAGNOSIS — I95.9 HYPOTENSION, UNSPECIFIED: ICD-10-CM

## 2025-03-16 DIAGNOSIS — Z88.0 ALLERGY STATUS TO PENICILLIN: ICD-10-CM

## 2025-03-16 DIAGNOSIS — Z71.1 PERSON WITH FEARED HEALTH COMPLAINT IN WHOM NO DIAGNOSIS IS MADE: ICD-10-CM

## 2025-03-16 DIAGNOSIS — G20.A1 PARKINSON'S DISEASE WITHOUT DYSKINESIA, WITHOUT MENTION OF FLUCTUATIONS: ICD-10-CM

## 2025-03-19 ENCOUNTER — NON-APPOINTMENT (OUTPATIENT)
Age: 75
End: 2025-03-19

## 2025-03-19 ENCOUNTER — APPOINTMENT (OUTPATIENT)
Dept: CARDIOLOGY | Facility: CLINIC | Age: 75
End: 2025-03-19
Payer: MEDICARE

## 2025-03-19 VITALS
HEART RATE: 67 BPM | SYSTOLIC BLOOD PRESSURE: 105 MMHG | HEIGHT: 69 IN | OXYGEN SATURATION: 97 % | WEIGHT: 204 LBS | DIASTOLIC BLOOD PRESSURE: 65 MMHG | BODY MASS INDEX: 30.21 KG/M2

## 2025-03-19 DIAGNOSIS — I07.9 RHEUMATIC TRICUSPID VALVE DISEASE, UNSPECIFIED: ICD-10-CM

## 2025-03-19 DIAGNOSIS — R60.9 EDEMA, UNSPECIFIED: ICD-10-CM

## 2025-03-19 DIAGNOSIS — G20.A1 PARKINSON'S DISEASE WITHOUT DYSKINESIA, WITHOUT MENTION OF FLUCTUATIONS: ICD-10-CM

## 2025-03-19 DIAGNOSIS — I95.9 HYPOTENSION, UNSPECIFIED: ICD-10-CM

## 2025-03-19 DIAGNOSIS — Z00.00 ENCOUNTER FOR GENERAL ADULT MEDICAL EXAMINATION W/OUT ABNORMAL FINDINGS: ICD-10-CM

## 2025-03-19 DIAGNOSIS — R60.0 LOCALIZED EDEMA: ICD-10-CM

## 2025-03-19 PROCEDURE — G2211 COMPLEX E/M VISIT ADD ON: CPT

## 2025-03-19 PROCEDURE — 93000 ELECTROCARDIOGRAM COMPLETE: CPT

## 2025-03-19 PROCEDURE — 99215 OFFICE O/P EST HI 40 MIN: CPT

## 2025-03-19 RX ORDER — MIDODRINE HYDROCHLORIDE 2.5 MG/1
2.5 TABLET ORAL
Qty: 60 | Refills: 2 | Status: ACTIVE | COMMUNITY
Start: 2025-03-19 | End: 1900-01-01

## 2025-03-21 ENCOUNTER — APPOINTMENT (OUTPATIENT)
Dept: CARDIOLOGY | Facility: CLINIC | Age: 75
End: 2025-03-21

## 2025-03-21 ENCOUNTER — RX ONLY (RX ONLY)
Age: 75
End: 2025-03-21

## 2025-03-21 ENCOUNTER — OFFICE (OUTPATIENT)
Dept: URBAN - METROPOLITAN AREA CLINIC 116 | Facility: CLINIC | Age: 75
Setting detail: OPHTHALMOLOGY
End: 2025-03-21
Payer: MEDICARE

## 2025-03-21 DIAGNOSIS — H01.001: ICD-10-CM

## 2025-03-21 DIAGNOSIS — H01.004: ICD-10-CM

## 2025-03-21 DIAGNOSIS — H25.13: ICD-10-CM

## 2025-03-21 PROCEDURE — 99213 OFFICE O/P EST LOW 20 MIN: CPT | Performed by: OPTOMETRIST

## 2025-03-21 ASSESSMENT — VISUAL ACUITY
OD_BCVA: 20/70
OS_BCVA: 20/20

## 2025-03-21 ASSESSMENT — REFRACTION_CURRENTRX
OS_OVR_VA: 20/
OS_SPHERE: +3.50
OD_SPHERE: +3.75
OS_SPHERE: +1.50
OD_VPRISM_DIRECTION: SV
OD_AXIS: 100
OD_SPHERE: +1.50
OS_VPRISM_DIRECTION: SV
OD_OVR_VA: 20/
OD_OVR_VA: 20/
OD_CYLINDER: -0.50
OS_AXIS: 100
OS_CYLINDER: -0.25
OD_AXIS: 110
OS_OVR_VA: 20/
OS_CYLINDER: -0.25
OS_AXIS: 105
OD_CYLINDER: -0.25

## 2025-03-21 ASSESSMENT — REFRACTION_MANIFEST
OD_VA1: 20/25
OS_ADD: +2.50
OS_CYLINDER: -0.75
OS_SPHERE: +1.75
OD_CYLINDER: SPH
OS_AXIS: 080
OS_VA1: 20/30
OD_SPHERE: PLANO
OS_VA1: 20/30
OD_VA1: 20/40
OS_SPHERE: +1.50
OD_CYLINDER: SPH
OS_AXIS: 085
OD_ADD: +2.50
OS_SPHERE: +2.00
OS_ADD: +2.50
OS_VA1: 20/40
OD_SPHERE: +1.50
OD_VA1: 20/20
OD_ADD: +2.50
OS_CYLINDER: -1.00
OS_ADD: +2.50
OD_SPHERE: +1.50
OD_ADD: +2.50
OS_CYLINDER: -0.75
OS_AXIS: 080

## 2025-03-21 ASSESSMENT — SUPERFICIAL PUNCTATE KERATITIS (SPK)
OD_SPK: T
OS_SPK: T

## 2025-03-21 ASSESSMENT — LID EXAM ASSESSMENTS
OD_BLEPHARITIS: RUL 2+
OS_BLEPHARITIS: LUL 2+

## 2025-03-21 ASSESSMENT — CONFRONTATIONAL VISUAL FIELD TEST (CVF)
OD_FINDINGS: FULL
OS_FINDINGS: FULL

## 2025-04-22 ENCOUNTER — APPOINTMENT (OUTPATIENT)
Dept: GASTROENTEROLOGY | Facility: CLINIC | Age: 75
End: 2025-04-22
Payer: MEDICARE

## 2025-04-22 VITALS
HEIGHT: 69 IN | HEART RATE: 61 BPM | SYSTOLIC BLOOD PRESSURE: 104 MMHG | OXYGEN SATURATION: 100 % | TEMPERATURE: 98 F | DIASTOLIC BLOOD PRESSURE: 63 MMHG | WEIGHT: 210 LBS | BODY MASS INDEX: 31.1 KG/M2

## 2025-04-22 DIAGNOSIS — I07.9 RHEUMATIC TRICUSPID VALVE DISEASE, UNSPECIFIED: ICD-10-CM

## 2025-04-22 DIAGNOSIS — K59.00 CONSTIPATION, UNSPECIFIED: ICD-10-CM

## 2025-04-22 DIAGNOSIS — G20.A1 PARKINSON'S DISEASE WITHOUT DYSKINESIA, WITHOUT MENTION OF FLUCTUATIONS: ICD-10-CM

## 2025-04-22 DIAGNOSIS — Z86.79 PERSONAL HISTORY OF OTHER DISEASES OF THE CIRCULATORY SYSTEM: ICD-10-CM

## 2025-04-22 PROCEDURE — 99203 OFFICE O/P NEW LOW 30 MIN: CPT

## 2025-05-13 ENCOUNTER — APPOINTMENT (OUTPATIENT)
Dept: NEUROLOGY | Facility: CLINIC | Age: 75
End: 2025-05-13
Payer: MEDICARE

## 2025-05-13 VITALS
DIASTOLIC BLOOD PRESSURE: 50 MMHG | HEART RATE: 72 BPM | WEIGHT: 210 LBS | BODY MASS INDEX: 31.1 KG/M2 | HEIGHT: 69 IN | SYSTOLIC BLOOD PRESSURE: 98 MMHG

## 2025-05-13 DIAGNOSIS — R47.9 UNSPECIFIED SPEECH DISTURBANCES: ICD-10-CM

## 2025-05-13 DIAGNOSIS — G20.A1 PARKINSON'S DISEASE WITHOUT DYSKINESIA, WITHOUT MENTION OF FLUCTUATIONS: ICD-10-CM

## 2025-05-13 DIAGNOSIS — I95.9 HYPOTENSION, UNSPECIFIED: ICD-10-CM

## 2025-05-13 DIAGNOSIS — R26.9 UNSPECIFIED ABNORMALITIES OF GAIT AND MOBILITY: ICD-10-CM

## 2025-05-13 PROCEDURE — G2211 COMPLEX E/M VISIT ADD ON: CPT

## 2025-05-13 PROCEDURE — 99214 OFFICE O/P EST MOD 30 MIN: CPT

## 2025-05-13 RX ORDER — AMANTADINE HYDROCHLORIDE 100 1/1
100 TABLET ORAL
Qty: 30 | Refills: 5 | Status: ACTIVE | COMMUNITY
Start: 2025-05-13 | End: 1900-01-01

## 2025-07-07 ENCOUNTER — RX RENEWAL (OUTPATIENT)
Age: 75
End: 2025-07-07

## 2025-07-22 ENCOUNTER — APPOINTMENT (OUTPATIENT)
Dept: UROLOGY | Facility: CLINIC | Age: 75
End: 2025-07-22

## 2025-08-01 ENCOUNTER — APPOINTMENT (OUTPATIENT)
Dept: UROLOGY | Facility: CLINIC | Age: 75
End: 2025-08-01
Payer: MEDICARE

## 2025-08-01 ENCOUNTER — RESULT CHARGE (OUTPATIENT)
Age: 75
End: 2025-08-01

## 2025-08-01 VITALS
WEIGHT: 218 LBS | SYSTOLIC BLOOD PRESSURE: 98 MMHG | BODY MASS INDEX: 32.29 KG/M2 | HEIGHT: 69 IN | DIASTOLIC BLOOD PRESSURE: 66 MMHG

## 2025-08-01 DIAGNOSIS — N40.0 BENIGN PROSTATIC HYPERPLASIA WITHOUT LOWER URINARY TRACT SYMPMS: ICD-10-CM

## 2025-08-01 LAB
BILIRUB UR QL STRIP: NORMAL
CLARITY UR: CLEAR
COLLECTION METHOD: NORMAL
GLUCOSE UR-MCNC: NORMAL
HCG UR QL: 0.2 EU/DL
HGB UR QL STRIP.AUTO: NORMAL
KETONES UR-MCNC: ABNORMAL
LEUKOCYTE ESTERASE UR QL STRIP: NORMAL
NITRITE UR QL STRIP: NORMAL
PH UR STRIP: 5.5
PROT UR STRIP-MCNC: NORMAL
SP GR UR STRIP: 1.03

## 2025-08-01 PROCEDURE — 81003 URINALYSIS AUTO W/O SCOPE: CPT | Mod: QW

## 2025-08-01 PROCEDURE — 99213 OFFICE O/P EST LOW 20 MIN: CPT | Mod: 25

## 2025-08-19 ENCOUNTER — APPOINTMENT (OUTPATIENT)
Dept: NEUROLOGY | Facility: CLINIC | Age: 75
End: 2025-08-19
Payer: MEDICARE

## 2025-08-19 VITALS
HEART RATE: 80 BPM | DIASTOLIC BLOOD PRESSURE: 66 MMHG | HEIGHT: 69 IN | BODY MASS INDEX: 32.29 KG/M2 | WEIGHT: 218 LBS | SYSTOLIC BLOOD PRESSURE: 107 MMHG

## 2025-08-19 DIAGNOSIS — I63.9 CEREBRAL INFARCTION, UNSPECIFIED: ICD-10-CM

## 2025-08-19 DIAGNOSIS — G20.A1 PARKINSON'S DISEASE WITHOUT DYSKINESIA, WITHOUT MENTION OF FLUCTUATIONS: ICD-10-CM

## 2025-08-19 PROCEDURE — 99214 OFFICE O/P EST MOD 30 MIN: CPT
